# Patient Record
Sex: FEMALE | Race: WHITE | Employment: OTHER | URBAN - METROPOLITAN AREA
[De-identification: names, ages, dates, MRNs, and addresses within clinical notes are randomized per-mention and may not be internally consistent; named-entity substitution may affect disease eponyms.]

---

## 2020-12-30 ENCOUNTER — HOSPITAL ENCOUNTER (OUTPATIENT)
Dept: GENERAL RADIOLOGY | Age: 65
Setting detail: OUTPATIENT SURGERY
Discharge: HOME | End: 2021-01-01
Attending: ORTHOPAEDIC SURGERY
Payer: MEDICARE

## 2020-12-30 ENCOUNTER — ANESTHESIA (OUTPATIENT)
Dept: OPERATING ROOM | Age: 65
End: 2020-12-30
Payer: MEDICARE

## 2020-12-30 ENCOUNTER — ANESTHESIA EVENT (OUTPATIENT)
Dept: OPERATING ROOM | Age: 65
End: 2020-12-30
Payer: MEDICARE

## 2020-12-30 ENCOUNTER — HOSPITAL ENCOUNTER (OUTPATIENT)
Age: 65
Setting detail: OUTPATIENT SURGERY
Discharge: HOME | End: 2020-12-30
Attending: ORTHOPAEDIC SURGERY | Admitting: ORTHOPAEDIC SURGERY
Payer: MEDICARE

## 2020-12-30 VITALS
TEMPERATURE: 97.9 F | RESPIRATION RATE: 20 BRPM | OXYGEN SATURATION: 99 % | DIASTOLIC BLOOD PRESSURE: 75 MMHG | HEART RATE: 89 BPM | SYSTOLIC BLOOD PRESSURE: 135 MMHG | BODY MASS INDEX: 21.34 KG/M2 | HEIGHT: 64 IN | WEIGHT: 125 LBS

## 2020-12-30 VITALS — SYSTOLIC BLOOD PRESSURE: 99 MMHG | DIASTOLIC BLOOD PRESSURE: 57 MMHG | OXYGEN SATURATION: 100 %

## 2020-12-30 DIAGNOSIS — R52 PAIN: ICD-10-CM

## 2020-12-30 LAB
ANION GAP SERPL CALCULATED.3IONS-SCNC: 10 MEQ/L (ref 9–15)
BASOPHILS ABSOLUTE: 0.1 K/UL (ref 0–0.2)
BASOPHILS RELATIVE PERCENT: 1.2 %
BUN BLDV-MCNC: 9 MG/DL (ref 8–23)
CALCIUM SERPL-MCNC: 8.7 MG/DL (ref 8.5–9.9)
CHLORIDE BLD-SCNC: 98 MEQ/L (ref 95–107)
CO2: 26 MEQ/L (ref 20–31)
CREAT SERPL-MCNC: 0.65 MG/DL (ref 0.5–0.9)
EKG ATRIAL RATE: 89 BPM
EKG P AXIS: 65 DEGREES
EKG P-R INTERVAL: 126 MS
EKG Q-T INTERVAL: 356 MS
EKG QRS DURATION: 76 MS
EKG QTC CALCULATION (BAZETT): 433 MS
EKG R AXIS: 36 DEGREES
EKG T AXIS: 41 DEGREES
EKG VENTRICULAR RATE: 89 BPM
EOSINOPHILS ABSOLUTE: 0.5 K/UL (ref 0–0.7)
EOSINOPHILS RELATIVE PERCENT: 4.2 %
GFR AFRICAN AMERICAN: >60
GFR NON-AFRICAN AMERICAN: >60
GLUCOSE BLD-MCNC: 87 MG/DL (ref 70–99)
HCT VFR BLD CALC: 46.1 % (ref 37–47)
HEMOGLOBIN: 15.3 G/DL (ref 12–16)
LYMPHOCYTES ABSOLUTE: 2.3 K/UL (ref 1–4.8)
LYMPHOCYTES RELATIVE PERCENT: 20.9 %
MCH RBC QN AUTO: 27.7 PG (ref 27–31.3)
MCHC RBC AUTO-ENTMCNC: 33.1 % (ref 33–37)
MCV RBC AUTO: 83.7 FL (ref 82–100)
MONOCYTES ABSOLUTE: 1 K/UL (ref 0.2–0.8)
MONOCYTES RELATIVE PERCENT: 8.8 %
NEUTROPHILS ABSOLUTE: 7.1 K/UL (ref 1.4–6.5)
NEUTROPHILS RELATIVE PERCENT: 64.9 %
PDW BLD-RTO: 14.3 % (ref 11.5–14.5)
PLATELET # BLD: 247 K/UL (ref 130–400)
POTASSIUM SERPL-SCNC: 5.1 MEQ/L (ref 3.4–4.9)
RBC # BLD: 5.51 M/UL (ref 4.2–5.4)
SARS-COV-2, NAAT: NOT DETECTED
SODIUM BLD-SCNC: 134 MEQ/L (ref 135–144)
WBC # BLD: 11 K/UL (ref 4.8–10.8)

## 2020-12-30 PROCEDURE — 2580000003 HC RX 258: Performed by: ORTHOPAEDIC SURGERY

## 2020-12-30 PROCEDURE — 3700000000 HC ANESTHESIA ATTENDED CARE: Performed by: ORTHOPAEDIC SURGERY

## 2020-12-30 PROCEDURE — C1713 ANCHOR/SCREW BN/BN,TIS/BN: HCPCS | Performed by: ORTHOPAEDIC SURGERY

## 2020-12-30 PROCEDURE — 7100000010 HC PHASE II RECOVERY - FIRST 15 MIN: Performed by: ORTHOPAEDIC SURGERY

## 2020-12-30 PROCEDURE — 6360000002 HC RX W HCPCS: Performed by: ORTHOPAEDIC SURGERY

## 2020-12-30 PROCEDURE — 2500000003 HC RX 250 WO HCPCS: Performed by: ORTHOPAEDIC SURGERY

## 2020-12-30 PROCEDURE — U0002 COVID-19 LAB TEST NON-CDC: HCPCS

## 2020-12-30 PROCEDURE — 2709999900 HC NON-CHARGEABLE SUPPLY: Performed by: ORTHOPAEDIC SURGERY

## 2020-12-30 PROCEDURE — 3700000001 HC ADD 15 MINUTES (ANESTHESIA): Performed by: ORTHOPAEDIC SURGERY

## 2020-12-30 PROCEDURE — 3209999900 FLUORO FOR SURGICAL PROCEDURES

## 2020-12-30 PROCEDURE — 80048 BASIC METABOLIC PNL TOTAL CA: CPT

## 2020-12-30 PROCEDURE — 85025 COMPLETE CBC W/AUTO DIFF WBC: CPT

## 2020-12-30 PROCEDURE — 7100000000 HC PACU RECOVERY - FIRST 15 MIN: Performed by: ORTHOPAEDIC SURGERY

## 2020-12-30 PROCEDURE — 3600000014 HC SURGERY LEVEL 4 ADDTL 15MIN: Performed by: ORTHOPAEDIC SURGERY

## 2020-12-30 PROCEDURE — 3600000004 HC SURGERY LEVEL 4 BASE: Performed by: ORTHOPAEDIC SURGERY

## 2020-12-30 PROCEDURE — 7100000011 HC PHASE II RECOVERY - ADDTL 15 MIN: Performed by: ORTHOPAEDIC SURGERY

## 2020-12-30 PROCEDURE — 7100000001 HC PACU RECOVERY - ADDTL 15 MIN: Performed by: ORTHOPAEDIC SURGERY

## 2020-12-30 PROCEDURE — 93005 ELECTROCARDIOGRAM TRACING: CPT | Performed by: ORTHOPAEDIC SURGERY

## 2020-12-30 PROCEDURE — 6360000002 HC RX W HCPCS: Performed by: NURSE ANESTHETIST, CERTIFIED REGISTERED

## 2020-12-30 DEVICE — IMPLANTABLE DEVICE: Type: IMPLANTABLE DEVICE | Status: FUNCTIONAL

## 2020-12-30 DEVICE — SCREW BNE L11MM DIA1.3MM CORT HND 316L S STL ST T4 STARDRV: Type: IMPLANTABLE DEVICE | Status: FUNCTIONAL

## 2020-12-30 RX ORDER — ATORVASTATIN CALCIUM 80 MG/1
80 TABLET, FILM COATED ORAL DAILY
COMMUNITY

## 2020-12-30 RX ORDER — SODIUM CHLORIDE, SODIUM LACTATE, POTASSIUM CHLORIDE, CALCIUM CHLORIDE 600; 310; 30; 20 MG/100ML; MG/100ML; MG/100ML; MG/100ML
INJECTION, SOLUTION INTRAVENOUS CONTINUOUS
Status: DISCONTINUED | OUTPATIENT
Start: 2020-12-30 | End: 2020-12-30 | Stop reason: HOSPADM

## 2020-12-30 RX ORDER — HYDROCODONE BITARTRATE AND ACETAMINOPHEN 5; 325 MG/1; MG/1
2 TABLET ORAL PRN
Status: DISCONTINUED | OUTPATIENT
Start: 2020-12-30 | End: 2020-12-30 | Stop reason: HOSPADM

## 2020-12-30 RX ORDER — PROPOFOL 10 MG/ML
INJECTION, EMULSION INTRAVENOUS PRN
Status: DISCONTINUED | OUTPATIENT
Start: 2020-12-30 | End: 2020-12-30 | Stop reason: SDUPTHER

## 2020-12-30 RX ORDER — LISINOPRIL 5 MG/1
5 TABLET ORAL DAILY
COMMUNITY

## 2020-12-30 RX ORDER — HYDROCODONE BITARTRATE AND ACETAMINOPHEN 5; 325 MG/1; MG/1
1 TABLET ORAL PRN
Status: DISCONTINUED | OUTPATIENT
Start: 2020-12-30 | End: 2020-12-30 | Stop reason: HOSPADM

## 2020-12-30 RX ORDER — CEFAZOLIN SODIUM 2 G/50ML
2 SOLUTION INTRAVENOUS
Status: COMPLETED | OUTPATIENT
Start: 2020-12-30 | End: 2020-12-30

## 2020-12-30 RX ORDER — DIPHENHYDRAMINE HYDROCHLORIDE 50 MG/ML
12.5 INJECTION INTRAMUSCULAR; INTRAVENOUS
Status: DISCONTINUED | OUTPATIENT
Start: 2020-12-30 | End: 2020-12-30 | Stop reason: HOSPADM

## 2020-12-30 RX ORDER — MEPERIDINE HYDROCHLORIDE 25 MG/ML
12.5 INJECTION INTRAMUSCULAR; INTRAVENOUS; SUBCUTANEOUS EVERY 5 MIN PRN
Status: DISCONTINUED | OUTPATIENT
Start: 2020-12-30 | End: 2020-12-30 | Stop reason: HOSPADM

## 2020-12-30 RX ORDER — DEXAMETHASONE SODIUM PHOSPHATE 4 MG/ML
INJECTION, SOLUTION INTRA-ARTICULAR; INTRALESIONAL; INTRAMUSCULAR; INTRAVENOUS; SOFT TISSUE PRN
Status: DISCONTINUED | OUTPATIENT
Start: 2020-12-30 | End: 2020-12-30 | Stop reason: SDUPTHER

## 2020-12-30 RX ORDER — LIDOCAINE HYDROCHLORIDE 10 MG/ML
10 INJECTION, SOLUTION INFILTRATION; PERINEURAL
Status: DISCONTINUED | OUTPATIENT
Start: 2020-12-30 | End: 2020-12-30 | Stop reason: HOSPADM

## 2020-12-30 RX ORDER — HYDROCHLOROTHIAZIDE 25 MG/1
25 TABLET ORAL DAILY
COMMUNITY

## 2020-12-30 RX ORDER — ACETAMINOPHEN 500 MG
1000 TABLET ORAL EVERY 6 HOURS PRN
COMMUNITY

## 2020-12-30 RX ORDER — DIPHENHYDRAMINE HYDROCHLORIDE 50 MG/ML
INJECTION INTRAMUSCULAR; INTRAVENOUS PRN
Status: DISCONTINUED | OUTPATIENT
Start: 2020-12-30 | End: 2020-12-30 | Stop reason: SDUPTHER

## 2020-12-30 RX ORDER — ONDANSETRON 2 MG/ML
INJECTION INTRAMUSCULAR; INTRAVENOUS PRN
Status: DISCONTINUED | OUTPATIENT
Start: 2020-12-30 | End: 2020-12-30 | Stop reason: SDUPTHER

## 2020-12-30 RX ORDER — MAGNESIUM HYDROXIDE 1200 MG/15ML
LIQUID ORAL CONTINUOUS PRN
Status: COMPLETED | OUTPATIENT
Start: 2020-12-30 | End: 2020-12-30

## 2020-12-30 RX ORDER — ONDANSETRON 2 MG/ML
4 INJECTION INTRAMUSCULAR; INTRAVENOUS
Status: DISCONTINUED | OUTPATIENT
Start: 2020-12-30 | End: 2020-12-30 | Stop reason: HOSPADM

## 2020-12-30 RX ORDER — MIDAZOLAM HYDROCHLORIDE 1 MG/ML
INJECTION INTRAMUSCULAR; INTRAVENOUS PRN
Status: DISCONTINUED | OUTPATIENT
Start: 2020-12-30 | End: 2020-12-30 | Stop reason: SDUPTHER

## 2020-12-30 RX ORDER — BUPIVACAINE HYDROCHLORIDE 5 MG/ML
INJECTION, SOLUTION EPIDURAL; INTRACAUDAL PRN
Status: DISCONTINUED | OUTPATIENT
Start: 2020-12-30 | End: 2020-12-30 | Stop reason: ALTCHOICE

## 2020-12-30 RX ORDER — LIDOCAINE HYDROCHLORIDE 20 MG/ML
INJECTION, SOLUTION INTRAVENOUS PRN
Status: DISCONTINUED | OUTPATIENT
Start: 2020-12-30 | End: 2020-12-30 | Stop reason: SDUPTHER

## 2020-12-30 RX ORDER — BETAMETHASONE DIPROPIONATE 0.05 %
OINTMENT (GRAM) TOPICAL 2 TIMES DAILY
COMMUNITY

## 2020-12-30 RX ORDER — LIDOCAINE HYDROCHLORIDE 10 MG/ML
1 INJECTION, SOLUTION EPIDURAL; INFILTRATION; INTRACAUDAL; PERINEURAL
Status: DISCONTINUED | OUTPATIENT
Start: 2020-12-30 | End: 2020-12-30 | Stop reason: HOSPADM

## 2020-12-30 RX ORDER — PREDNISONE 1 MG/1
5 TABLET ORAL DAILY
COMMUNITY

## 2020-12-30 RX ORDER — METOCLOPRAMIDE HYDROCHLORIDE 5 MG/ML
10 INJECTION INTRAMUSCULAR; INTRAVENOUS
Status: DISCONTINUED | OUTPATIENT
Start: 2020-12-30 | End: 2020-12-30 | Stop reason: HOSPADM

## 2020-12-30 RX ORDER — ESOMEPRAZOLE MAGNESIUM 40 MG/1
40 FOR SUSPENSION ORAL DAILY
COMMUNITY

## 2020-12-30 RX ORDER — SODIUM CHLORIDE 0.9 % (FLUSH) 0.9 %
10 SYRINGE (ML) INJECTION PRN
Status: DISCONTINUED | OUTPATIENT
Start: 2020-12-30 | End: 2020-12-30 | Stop reason: HOSPADM

## 2020-12-30 RX ORDER — FENTANYL CITRATE 50 UG/ML
50 INJECTION, SOLUTION INTRAMUSCULAR; INTRAVENOUS EVERY 10 MIN PRN
Status: DISCONTINUED | OUTPATIENT
Start: 2020-12-30 | End: 2020-12-30 | Stop reason: HOSPADM

## 2020-12-30 RX ORDER — INFLIXIMAB 100 MG/10ML
5 INJECTION, POWDER, LYOPHILIZED, FOR SOLUTION INTRAVENOUS SEE ADMIN INSTRUCTIONS
COMMUNITY

## 2020-12-30 RX ORDER — BUPIVACAINE HYDROCHLORIDE 5 MG/ML
30 INJECTION, SOLUTION EPIDURAL; INTRACAUDAL
Status: DISCONTINUED | OUTPATIENT
Start: 2020-12-30 | End: 2020-12-30 | Stop reason: HOSPADM

## 2020-12-30 RX ORDER — SODIUM CHLORIDE 0.9 % (FLUSH) 0.9 %
10 SYRINGE (ML) INJECTION EVERY 12 HOURS SCHEDULED
Status: DISCONTINUED | OUTPATIENT
Start: 2020-12-30 | End: 2020-12-30 | Stop reason: HOSPADM

## 2020-12-30 RX ORDER — FENTANYL CITRATE 50 UG/ML
INJECTION, SOLUTION INTRAMUSCULAR; INTRAVENOUS PRN
Status: DISCONTINUED | OUTPATIENT
Start: 2020-12-30 | End: 2020-12-30 | Stop reason: SDUPTHER

## 2020-12-30 RX ADMIN — SODIUM CHLORIDE, POTASSIUM CHLORIDE, SODIUM LACTATE AND CALCIUM CHLORIDE: 600; 310; 30; 20 INJECTION, SOLUTION INTRAVENOUS at 11:05

## 2020-12-30 RX ADMIN — CEFAZOLIN SODIUM 2 G: 2 SOLUTION INTRAVENOUS at 12:50

## 2020-12-30 RX ADMIN — FENTANYL CITRATE 25 MCG: 50 INJECTION, SOLUTION INTRAMUSCULAR; INTRAVENOUS at 13:11

## 2020-12-30 RX ADMIN — FENTANYL CITRATE 25 MCG: 50 INJECTION, SOLUTION INTRAMUSCULAR; INTRAVENOUS at 12:53

## 2020-12-30 RX ADMIN — DIPHENHYDRAMINE HYDROCHLORIDE 12.5 MG: 50 INJECTION INTRAMUSCULAR; INTRAVENOUS at 12:43

## 2020-12-30 RX ADMIN — LIDOCAINE HYDROCHLORIDE 50 MG: 20 INJECTION, SOLUTION INTRAVENOUS at 12:40

## 2020-12-30 RX ADMIN — DEXAMETHASONE SODIUM PHOSPHATE 4 MG: 4 INJECTION, SOLUTION INTRAMUSCULAR; INTRAVENOUS at 12:44

## 2020-12-30 RX ADMIN — PROPOFOL 40 MG: 10 INJECTION, EMULSION INTRAVENOUS at 12:48

## 2020-12-30 RX ADMIN — MIDAZOLAM HYDROCHLORIDE 2 MG: 2 INJECTION, SOLUTION INTRAMUSCULAR; INTRAVENOUS at 12:34

## 2020-12-30 RX ADMIN — PROPOFOL 100 MG: 10 INJECTION, EMULSION INTRAVENOUS at 12:40

## 2020-12-30 RX ADMIN — ONDANSETRON 4 MG: 2 INJECTION INTRAMUSCULAR; INTRAVENOUS at 12:51

## 2020-12-30 ASSESSMENT — PULMONARY FUNCTION TESTS
PIF_VALUE: 3
PIF_VALUE: 8
PIF_VALUE: 3
PIF_VALUE: 6
PIF_VALUE: 3
PIF_VALUE: 6
PIF_VALUE: 3
PIF_VALUE: 4
PIF_VALUE: 3
PIF_VALUE: 8
PIF_VALUE: 0
PIF_VALUE: 0
PIF_VALUE: 3
PIF_VALUE: 3
PIF_VALUE: 8
PIF_VALUE: 0
PIF_VALUE: 3
PIF_VALUE: 8
PIF_VALUE: 3
PIF_VALUE: 8
PIF_VALUE: 6
PIF_VALUE: 3
PIF_VALUE: 8
PIF_VALUE: 8
PIF_VALUE: 6
PIF_VALUE: 8
PIF_VALUE: 3
PIF_VALUE: 3
PIF_VALUE: 1
PIF_VALUE: 3
PIF_VALUE: 8
PIF_VALUE: 6
PIF_VALUE: 0
PIF_VALUE: 9
PIF_VALUE: 8
PIF_VALUE: 8
PIF_VALUE: 3
PIF_VALUE: 9
PIF_VALUE: 9
PIF_VALUE: 3
PIF_VALUE: 8
PIF_VALUE: 3
PIF_VALUE: 3
PIF_VALUE: 8
PIF_VALUE: 3
PIF_VALUE: 3
PIF_VALUE: 2
PIF_VALUE: 3
PIF_VALUE: 8
PIF_VALUE: 3
PIF_VALUE: 2
PIF_VALUE: 3
PIF_VALUE: 8
PIF_VALUE: 6
PIF_VALUE: 9
PIF_VALUE: 8
PIF_VALUE: 3
PIF_VALUE: 8
PIF_VALUE: 0
PIF_VALUE: 3
PIF_VALUE: 3
PIF_VALUE: 6
PIF_VALUE: 1
PIF_VALUE: 0
PIF_VALUE: 3
PIF_VALUE: 3
PIF_VALUE: 8
PIF_VALUE: 18
PIF_VALUE: 3
PIF_VALUE: 3
PIF_VALUE: 9
PIF_VALUE: 8
PIF_VALUE: 0
PIF_VALUE: 8
PIF_VALUE: 3
PIF_VALUE: 9
PIF_VALUE: 8
PIF_VALUE: 1
PIF_VALUE: 8
PIF_VALUE: 3
PIF_VALUE: 3
PIF_VALUE: 8
PIF_VALUE: 9
PIF_VALUE: 9

## 2020-12-30 NOTE — ANESTHESIA POSTPROCEDURE EVALUATION
Department of Anesthesiology  Postprocedure Note    Patient: Lee Madrigal  MRN: [id number]  YOB: 1955  Date of evaluation: 12/30/2020  Time:  2:20 PM     Procedure Summary     Date: 12/30/20 Room / Location: [address]    Anesthesia Start: 7536 Anesthesia Stop:     Procedure: RIGHT CLOSED REDUCTION INTERNAL FIXATION VERSUS OPEN REDUCTION INTERNAL FIXATION RIGHT SMALL FINGER PROXIMAL PHALANX FRACTURE (Right Hand) Diagnosis: (RIGHT SMALL FINGER PROXIMAL PHALANX FRACTURE)    Surgeons: Alberto Montelongo DO Responsible Provider: Elis Schaeffer DO    Anesthesia Type: general ASA Status: 2          Anesthesia Type: general    Samantha Phase I:      Samantha Phase II:      Last vitals: Reviewed and per EMR flowsheets.        Anesthesia Post Evaluation    Patient location during evaluation: PACU  Patient participation: waiting for patient participation  Level of consciousness: awake and alert  Pain score: 0  Airway patency: patent  Nausea & Vomiting: no vomiting and no nausea  Complications: no  Cardiovascular status: hemodynamically stable  Respiratory status: acceptable, face mask, nonlabored ventilation, spontaneous ventilation and oral airway  Hydration status: stable

## 2020-12-30 NOTE — OP NOTE
Operative Note      Patient: Shiv Easley  YOB: 1955  MRN: [id number]    Date of Procedure: 12/30/2020        Preoperative diagnosis: Displaced unstable right small finger proximal phalanx fracture    Postoperative diagnosis: Same     Procedure planned: Open reduction with internal fixation right small finger proximal phalanx fracture versus closed reduction percutaneous pin fixation. Procedure performed: Open reduction with internal fixation right small finger proximal phalanx fracture    Surgeon: Denia Mayer D.O. Assistant: WES Kasper  The physician assistant was present through the entire case. Given the nature of the disease process and the procedure to be performed a skilled surgical assistant was necessary during the case. The assistant was necessary in order to hold retractors and directly assist in the operation. A certified scrub tech was at the back table managing instruments and supplies for the surgical case. Anesthesia: General    Estimated blood loss: Less than 20 cc    Drains: None    Tourniquet: Tourniquet for approximately 30 minutes to the well-padded right upper arm    Specimens: None    Implants: Synthes 1.3 as below    Indications: The patient sustained injury to the right small finger. X-ray showed displaced proximal phalanx fracture. Treatment options were discussed including operative and nonoperative strategies. She elected to proceed forth with surgery. Informed consent was signed and placed in the chart. Complications: None noted at the time of surgery     Description of operation: The patient was taken to the operative suite and placed in the supine position on the operating table. A timeout was performed and the right small finger was confirmed to be the operative site. She was carefully positioned on the table in such a fashion as to pad all bony prominences and peripheral nerves.  She was administered appropriate IV antibiotics and general anesthesia. She was prepped and draped in the normal sterile fashion. Fluoroscopic imaging was brought in. Close reduction forces were used to attempt a closed reduction and pinning. This proved insufficient. An apex ulnar curvilinear incision was made extending from MCP to PIP with a 15 blade. Dissection was carried down to the extensor mechanism and a full-thickness subcutaneous flap reflected radially. A small window was made about the ulnar border of the extensor mechanism and direct techniques were used to reduce the fracture. There was ulnar-sided comminution through the fracture plane that made passage of simple lag screws a poor option. It was deemed most appropriate to apply a dorsal plate and screw construct. The extensor tendon was split longitudinally and the fracture plane exposed. We could see the entirety of the fracture plane and it was anatomically reduced both by direct inspection and fluoroscopic imaging. The appropriate plate was then chosen from the Synthes set and coupled to bone with 2 cortical screws one in the near segment and one in the distal segment. Feeling satisfied 2 additional locking screws were placed in the distal segment and 2 additional locking screws placed in the near segment. The cortical screw placed in the distal segment was swapped out for a locking screw. An additional locking screw was placed in the near segment and the original cortical screw placed in the near segment removed as it had poor purchase. Final images were sent to the PACS system. Reduction was noted to be anatomic and fixation stable. Irrigation was performed. 4-0 Tycron was used to close the longitudinal split in the extensor mechanism. The tourniquet was relieved and hemostasis achieved. Interrupted nylon stitches were used to close the skin and a bulky soft dressing placed along with ulnar gutter plaster splint. She was allowed to arise from anesthesia and taken to recovery in stable condition.  Overall she tolerated procedure well. Disposition: Stable to PACU      Implants:  Implant Name Type Inv. Item Serial No.  Lot No. LRB No. Used Action   PLATE BNE [id number] 3X5 H NONSTERILE HND S STL Y SHP  PLATE BNE [id number] 3X5 H NONSTERILE HND S STL Y SHP  DEPUY [employer]  Right 1 Implanted   SCREW BNE L6MM DIA1. 3MM BEBETO HND S STL ST T4 STARDRV RECESS  SCREW BNE L6MM DIA1. 3MM BEBETO HND S STL ST T4 STARDRV RECESS  DEPUY [employer]  Right 2 Implanted   SCREW BNE L11MM DIA1. 3MM BEBETO HND 316L S STL ST T4 STARDRV  SCREW BNE L11MM DIA1. 3MM BEBETO HND 316L S STL ST T4 STARDRV  DEPUY SYNTHES [facility]-WD  Right 1 Implanted   SCREW BNE L6MM DIA1. 3MM HND 316L S STL ST [place] T4 STARDRV  SCREW BNE L6MM DIA1. 3MM HND 316L S STL ST [place] T4 STARDRV  DEPUY SYNTHES [facility]-WD  Right 1 Implanted   SCREW BNE L7MM DIA1. 3MM GRN S STL ST [place] FULL THRD T4  SCREW BNE L7MM DIA1. 3MM GRN S STL ST [place] FULL THRD T4  DEPUY [employer]  Right 1 Implanted   SCREW BNE L9MM DIA1. 3MM HND 316L S STL ST [place] T4 STARDRV  SCREW BNE L9MM DIA1. 3MM HND 316L S STL ST [place] T4 STARDRV  DEPUY [employer]  Right 1 Implanted   SCREW BNE L10MM DIA1. 3MM GRN S STL ST [place] FULL THRD T4  SCREW BNE L10MM DIA1. 3MM GRN S STL ST [place] FULL THRD T4  DEPUY [employer]  Right 2 Implanted   SCREW BNE L11MM DIA1. 3MM HND 316L S STL ST [place] T4 STARDRV  SCREW BNE L11MM DIA1. 3MM HND 316L S STL ST [place] T4 STARDRV  DEPUY SYNTHES [facility]-WD  Right 1 Implanted             Electronically signed by Tania Obrien DO on 12/30/2020 at 2:05 PM

## 2020-12-30 NOTE — ANESTHESIA PRE PROCEDURE
Department of Anesthesiology  Preprocedure Note       Name:  Perla Katz   Age:  77 y.o.  :  1955                                          MRN:  [id number]         Date:  2020      Surgeon: Kirsten Ralph):  Senthil Bravo DO    Procedure: Procedure(s):  RIGHT CLOSED REDUCTION INTERNAL FIXATION VERSUS OPEN REDUCTION INTERNAL FIXATION RIGHT SMALL FINGER PROXIMAL PHALANX FRACTURE SUPINE' C-ARM; SYNTHES VA HAND SET (LABS & COVID ON ADMIT) SYNTHES-LONI    Medications prior to admission:   Prior to Admission medications    Medication Sig Start Date End Date Taking? Authorizing Provider   betamethasone dipropionate (DIPROLENE) 0.05 % ointment Apply topically 2 times daily Apply topically 2 times daily.    Yes Historical Provider, MD   esomeprazole Magnesium (NEXIUM) 40 MG PACK Take 40 mg by mouth daily   Yes Historical Provider, MD   hydroCHLOROthiazide (HYDRODIURIL) 25 MG tablet Take 25 mg by mouth daily   Yes Historical Provider, MD   lisinopril (PRINIVIL;ZESTRIL) 5 MG tablet Take 5 mg by mouth daily   Yes Historical Provider, MD   predniSONE (DELTASONE) 5 MG tablet Take 5 mg by mouth daily   Yes Historical Provider, MD   acetaminophen (TYLENOL) 500 MG tablet Take 1,000 mg by mouth every 6 hours as needed for Pain   Yes Historical Provider, MD   atorvastatin (LIPITOR) 80 MG tablet Take 80 mg by mouth daily    Historical Provider, MD   denosumab (PROLIA) 60 MG/ML SOSY SC injection Inject 60 mg into the skin once    Historical Provider, MD   inFLIXimab (REMICADE) 100 MG injection Infuse 5 mg/kg intravenously See Admin Instructions    Historical Provider, MD       Current medications:    Current Facility-Administered Medications   Medication Dose Route Frequency Provider Last Rate Last Admin    ceFAZolin (ANCEF) 2 g in dextrose 3 % 50 mL IVPB (duplex)  2 g Intravenous On Call to Alexandre Tinoco DO        lactated ringers infusion   Intravenous Continuous Darío Little DO

## 2020-12-30 NOTE — PROGRESS NOTES
To room 18 accompanied by Kirsten Charles. Head of bed elevated, taking ice chips. Does not want anything to eat or drink while here. Good circulation, arm in sling. Ice pack with pt.    1520  Going home instructions with one prescription given to pt with good understanding by Corin Montgomery RN.

## 2020-12-31 PROCEDURE — 93010 ELECTROCARDIOGRAM REPORT: CPT | Performed by: INTERNAL MEDICINE

## 2023-05-30 LAB
ANION GAP IN SER/PLAS: 12 MMOL/L (ref 10–20)
CALCIUM (MG/DL) IN SER/PLAS: 9.8 MG/DL (ref 8.6–10.6)
CARBON DIOXIDE, TOTAL (MMOL/L) IN SER/PLAS: 30 MMOL/L (ref 21–32)
CHLORIDE (MMOL/L) IN SER/PLAS: 96 MMOL/L (ref 98–107)
CREATININE (MG/DL) IN SER/PLAS: 0.82 MG/DL (ref 0.5–1.05)
GFR FEMALE: 77 ML/MIN/1.73M2
GLUCOSE (MG/DL) IN SER/PLAS: 91 MG/DL (ref 74–99)
POTASSIUM (MMOL/L) IN SER/PLAS: 4.2 MMOL/L (ref 3.5–5.3)
SODIUM (MMOL/L) IN SER/PLAS: 134 MMOL/L (ref 136–145)
UREA NITROGEN (MG/DL) IN SER/PLAS: 13 MG/DL (ref 6–23)

## 2023-09-26 PROBLEM — I25.10 CAD (CORONARY ARTERY DISEASE): Status: ACTIVE | Noted: 2023-09-26

## 2023-09-26 PROBLEM — K21.9 ESOPHAGEAL REFLUX: Status: ACTIVE | Noted: 2023-09-26

## 2023-09-26 PROBLEM — R13.14 PHARYNGOESOPHAGEAL DYSPHAGIA: Status: ACTIVE | Noted: 2023-09-26

## 2023-09-26 PROBLEM — E78.5 HYPERLIPIDEMIA: Status: ACTIVE | Noted: 2023-09-26

## 2023-09-26 PROBLEM — M19.049 CMC ARTHRITIS: Status: ACTIVE | Noted: 2023-09-26

## 2023-09-26 PROBLEM — M81.0 OSTEOPOROSIS: Status: ACTIVE | Noted: 2023-09-26

## 2023-09-26 PROBLEM — K63.5 COLON POLYPS: Status: ACTIVE | Noted: 2023-09-26

## 2023-09-26 PROBLEM — H92.01 OTALGIA OF RIGHT EAR: Status: ACTIVE | Noted: 2023-09-26

## 2023-09-26 PROBLEM — I10 ESSENTIAL HYPERTENSION: Status: ACTIVE | Noted: 2023-09-26

## 2023-09-26 PROBLEM — K21.00 GASTROESOPHAGEAL REFLUX DISEASE WITH ESOPHAGITIS: Status: ACTIVE | Noted: 2023-09-26

## 2023-09-26 PROBLEM — M11.20 PSEUDOGOUT: Status: ACTIVE | Noted: 2023-09-26

## 2023-09-26 PROBLEM — F17.210 CIGARETTE NICOTINE DEPENDENCE: Status: ACTIVE | Noted: 2023-09-26

## 2023-09-26 PROBLEM — L40.59 POLYARTICULAR PSORIATIC ARTHRITIS (MULTI): Status: ACTIVE | Noted: 2023-09-26

## 2023-09-26 PROBLEM — E55.9 VITAMIN D DEFICIENCY: Status: ACTIVE | Noted: 2023-09-26

## 2023-09-26 PROBLEM — M06.9 RHEUMATOID ARTHRITIS (MULTI): Status: ACTIVE | Noted: 2023-09-26

## 2023-09-26 RX ORDER — PREDNISONE 5 MG/1
1 TABLET ORAL DAILY
COMMUNITY
Start: 2013-02-22 | End: 2023-10-02 | Stop reason: HOSPADM

## 2023-09-26 RX ORDER — INFLIXIMAB 100 MG/10ML
5 INJECTION, POWDER, LYOPHILIZED, FOR SOLUTION INTRAVENOUS
Status: ON HOLD | COMMUNITY
Start: 2013-02-22 | End: 2023-09-30 | Stop reason: ENTERED-IN-ERROR

## 2023-09-26 RX ORDER — HYDROCHLOROTHIAZIDE 25 MG/1
1 TABLET ORAL DAILY
COMMUNITY
Start: 2012-04-26 | End: 2024-01-05 | Stop reason: SDUPTHER

## 2023-09-26 RX ORDER — ATORVASTATIN CALCIUM 80 MG/1
1 TABLET, FILM COATED ORAL DAILY
Status: ON HOLD | COMMUNITY
Start: 2020-03-30 | End: 2023-09-30 | Stop reason: ENTERED-IN-ERROR

## 2023-09-26 RX ORDER — DEXTROMETHORPHAN HYDROBROMIDE, GUAIFENESIN 5; 100 MG/5ML; MG/5ML
2 LIQUID ORAL EVERY 8 HOURS PRN
COMMUNITY

## 2023-09-26 RX ORDER — DENOSUMAB 60 MG/ML
60 INJECTION SUBCUTANEOUS ONCE
Status: ON HOLD | COMMUNITY
Start: 2020-08-18 | End: 2023-09-30 | Stop reason: ENTERED-IN-ERROR

## 2023-09-26 RX ORDER — ESOMEPRAZOLE MAGNESIUM 40 MG/1
40 CAPSULE, DELAYED RELEASE ORAL 2 TIMES DAILY
Status: ON HOLD | COMMUNITY
End: 2023-09-30 | Stop reason: ENTERED-IN-ERROR

## 2023-09-26 RX ORDER — LISINOPRIL 5 MG/1
1 TABLET ORAL DAILY
COMMUNITY
Start: 2012-07-26 | End: 2024-01-05 | Stop reason: SDUPTHER

## 2023-09-26 RX ORDER — GABAPENTIN 100 MG/1
100 CAPSULE ORAL 3 TIMES DAILY PRN
COMMUNITY

## 2023-09-26 RX ORDER — BETAMETHASONE DIPROPIONATE 0.5 MG/G
OINTMENT TOPICAL
Status: ON HOLD | COMMUNITY
Start: 2012-09-24 | End: 2023-09-30 | Stop reason: ENTERED-IN-ERROR

## 2023-09-28 ENCOUNTER — HOSPITAL ENCOUNTER (INPATIENT)
Dept: DATA CONVERSION | Facility: HOSPITAL | Age: 68
LOS: 4 days | Discharge: NURSING, REHAB OR LONG-TERM CARE | DRG: 481 | End: 2023-10-02
Attending: STUDENT IN AN ORGANIZED HEALTH CARE EDUCATION/TRAINING PROGRAM | Admitting: STUDENT IN AN ORGANIZED HEALTH CARE EDUCATION/TRAINING PROGRAM
Payer: MEDICARE

## 2023-09-28 DIAGNOSIS — S72.92XA: ICD-10-CM

## 2023-09-28 DIAGNOSIS — M05.761 RHEUMATOID ARTHRITIS INVOLVING BOTH KNEES WITH POSITIVE RHEUMATOID FACTOR (MULTI): Primary | ICD-10-CM

## 2023-09-28 DIAGNOSIS — M05.762 RHEUMATOID ARTHRITIS INVOLVING BOTH KNEES WITH POSITIVE RHEUMATOID FACTOR (MULTI): Primary | ICD-10-CM

## 2023-09-28 LAB
ALANINE AMINOTRANSFERASE (SGPT) (U/L) IN SER/PLAS: 15 U/L (ref 7–45)
ALBUMIN (G/DL) IN SER/PLAS: 3.6 G/DL (ref 3.4–5)
ALKALINE PHOSPHATASE (U/L) IN SER/PLAS: 55 U/L (ref 33–136)
ANION GAP IN SER/PLAS: 20 MMOL/L (ref 10–20)
ASPARTATE AMINOTRANSFERASE (SGOT) (U/L) IN SER/PLAS: 20 U/L (ref 9–39)
BASOPHILS (10*3/UL) IN BLOOD BY AUTOMATED COUNT: 0.02 X10E9/L (ref 0–0.1)
BASOPHILS/100 LEUKOCYTES IN BLOOD BY AUTOMATED COUNT: 0.1 % (ref 0–2)
BILIRUBIN TOTAL (MG/DL) IN SER/PLAS: 0.5 MG/DL (ref 0–1.2)
CALCIUM (MG/DL) IN SER/PLAS: 8.9 MG/DL (ref 8.6–10.3)
CARBON DIOXIDE, TOTAL (MMOL/L) IN SER/PLAS: 20 MMOL/L (ref 21–32)
CHLORIDE (MMOL/L) IN SER/PLAS: 105 MMOL/L (ref 98–107)
CREATININE (MG/DL) IN SER/PLAS: 1.69 MG/DL (ref 0.5–1.05)
EOSINOPHILS (10*3/UL) IN BLOOD BY AUTOMATED COUNT: 0.01 X10E9/L (ref 0–0.7)
EOSINOPHILS/100 LEUKOCYTES IN BLOOD BY AUTOMATED COUNT: 0.1 % (ref 0–6)
ERYTHROCYTE DISTRIBUTION WIDTH (RATIO) BY AUTOMATED COUNT: 14.4 % (ref 11.5–14.5)
ERYTHROCYTE MEAN CORPUSCULAR HEMOGLOBIN CONCENTRATION (G/DL) BY AUTOMATED: 30.3 G/DL (ref 32–36)
ERYTHROCYTE MEAN CORPUSCULAR VOLUME (FL) BY AUTOMATED COUNT: 92 FL (ref 80–100)
ERYTHROCYTES (10*6/UL) IN BLOOD BY AUTOMATED COUNT: 3.92 X10E12/L (ref 4–5.2)
GFR FEMALE: 32 ML/MIN/1.73M2
GLUCOSE (MG/DL) IN SER/PLAS: 189 MG/DL (ref 74–99)
HEMATOCRIT (%) IN BLOOD BY AUTOMATED COUNT: 36 % (ref 36–46)
HEMOGLOBIN (G/DL) IN BLOOD: 10.9 G/DL (ref 12–16)
IMMATURE GRANULOCYTES/100 LEUKOCYTES IN BLOOD BY AUTOMATED COUNT: 1.1 % (ref 0–0.9)
INR IN PPP BY COAGULATION ASSAY: 1.3 (ref 0.9–1.1)
LEUKOCYTES (10*3/UL) IN BLOOD BY AUTOMATED COUNT: 17.8 X10E9/L (ref 4.4–11.3)
LYMPHOCYTES (10*3/UL) IN BLOOD BY AUTOMATED COUNT: 2.63 X10E9/L (ref 1.2–4.8)
LYMPHOCYTES/100 LEUKOCYTES IN BLOOD BY AUTOMATED COUNT: 14.8 % (ref 13–44)
MONOCYTES (10*3/UL) IN BLOOD BY AUTOMATED COUNT: 1.91 X10E9/L (ref 0.1–1)
MONOCYTES/100 LEUKOCYTES IN BLOOD BY AUTOMATED COUNT: 10.7 % (ref 2–10)
NEUTROPHILS (10*3/UL) IN BLOOD BY AUTOMATED COUNT: 13.07 X10E9/L (ref 1.2–7.7)
NEUTROPHILS/100 LEUKOCYTES IN BLOOD BY AUTOMATED COUNT: 73.2 % (ref 40–80)
NRBC (PER 100 WBCS) BY AUTOMATED COUNT: 0 /100 WBC (ref 0–0)
PLATELETS (10*3/UL) IN BLOOD AUTOMATED COUNT: 186 X10E9/L (ref 150–450)
POTASSIUM (MMOL/L) IN SER/PLAS: 4.1 MMOL/L (ref 3.5–5.3)
PROTEIN TOTAL: 6.2 G/DL (ref 6.4–8.2)
PROTHROMBIN TIME (PT) IN PPP BY COAGULATION ASSAY: 15.2 SEC (ref 9.8–12.8)
SODIUM (MMOL/L) IN SER/PLAS: 141 MMOL/L (ref 136–145)
UREA NITROGEN (MG/DL) IN SER/PLAS: 50 MG/DL (ref 6–23)

## 2023-09-28 PROCEDURE — 99285 EMERGENCY DEPT VISIT HI MDM: CPT

## 2023-09-28 PROCEDURE — 80053 COMPREHEN METABOLIC PANEL: CPT

## 2023-09-28 PROCEDURE — 93005 ELECTROCARDIOGRAM TRACING: CPT

## 2023-09-28 PROCEDURE — 73130 X-RAY EXAM OF HAND: CPT | Mod: LT

## 2023-09-28 PROCEDURE — 70450 CT HEAD/BRAIN W/O DYE: CPT

## 2023-09-28 PROCEDURE — 96375 TX/PRO/DX INJ NEW DRUG ADDON: CPT

## 2023-09-28 PROCEDURE — 85025 COMPLETE CBC W/AUTO DIFF WBC: CPT

## 2023-09-28 PROCEDURE — 72125 CT NECK SPINE W/O DYE: CPT

## 2023-09-28 PROCEDURE — 73502 X-RAY EXAM HIP UNI 2-3 VIEWS: CPT | Mod: LT

## 2023-09-28 PROCEDURE — 9990 CHARGE CONVERSION

## 2023-09-28 PROCEDURE — 96365 THER/PROPH/DIAG IV INF INIT: CPT

## 2023-09-28 PROCEDURE — 85610 PROTHROMBIN TIME: CPT

## 2023-09-28 RX ORDER — ALBUTEROL SULFATE 0.83 MG/ML
3 SOLUTION RESPIRATORY (INHALATION) AS NEEDED
Status: CANCELLED | OUTPATIENT
Start: 2023-11-08

## 2023-09-28 RX ORDER — EPINEPHRINE 0.3 MG/.3ML
0.3 INJECTION SUBCUTANEOUS EVERY 5 MIN PRN
Status: CANCELLED | OUTPATIENT
Start: 2023-11-08

## 2023-09-28 RX ORDER — DIPHENHYDRAMINE HYDROCHLORIDE 50 MG/ML
50 INJECTION INTRAMUSCULAR; INTRAVENOUS AS NEEDED
Status: CANCELLED | OUTPATIENT
Start: 2023-11-08

## 2023-09-28 RX ORDER — FAMOTIDINE 10 MG/ML
20 INJECTION INTRAVENOUS ONCE AS NEEDED
Status: CANCELLED | OUTPATIENT
Start: 2023-11-08

## 2023-09-29 LAB
ABO GROUP (TYPE) IN BLOOD: NORMAL
ALBUMIN (G/DL) IN SER/PLAS: 3.4 G/DL (ref 3.4–5)
ANION GAP IN SER/PLAS: 9 MMOL/L (ref 10–20)
ANTIBODY SCREEN: NORMAL
APPEARANCE, URINE: CLEAR
ATRIAL RATE: 83 BPM
BILIRUBIN, URINE: NEGATIVE
BLOOD, URINE: NEGATIVE
CALCIUM (MG/DL) IN SER/PLAS: 8.4 MG/DL (ref 8.6–10.3)
CARBON DIOXIDE, TOTAL (MMOL/L) IN SER/PLAS: 30 MMOL/L (ref 21–32)
CHLORIDE (MMOL/L) IN SER/PLAS: 96 MMOL/L (ref 98–107)
COLOR, URINE: YELLOW
CREATININE (MG/DL) IN SER/PLAS: 0.71 MG/DL (ref 0.5–1.05)
ERYTHROCYTE DISTRIBUTION WIDTH (RATIO) BY AUTOMATED COUNT: 14.6 % (ref 11.5–14.5)
ERYTHROCYTE MEAN CORPUSCULAR HEMOGLOBIN CONCENTRATION (G/DL) BY AUTOMATED: 31.5 G/DL (ref 32–36)
ERYTHROCYTE MEAN CORPUSCULAR VOLUME (FL) BY AUTOMATED COUNT: 86 FL (ref 80–100)
ERYTHROCYTES (10*6/UL) IN BLOOD BY AUTOMATED COUNT: 4.27 X10E12/L (ref 4–5.2)
GFR FEMALE: >90 ML/MIN/1.73M2
GLUCOSE (MG/DL) IN SER/PLAS: 75 MG/DL (ref 74–99)
GLUCOSE, URINE: NEGATIVE MG/DL
HEMATOCRIT (%) IN BLOOD BY AUTOMATED COUNT: 36.8 % (ref 36–46)
HEMOGLOBIN (G/DL) IN BLOOD: 11.6 G/DL (ref 12–16)
KETONES, URINE: NEGATIVE MG/DL
LEUKOCYTE ESTERASE, URINE: NEGATIVE
LEUKOCYTES (10*3/UL) IN BLOOD BY AUTOMATED COUNT: 13 X10E9/L (ref 4.4–11.3)
MAGNESIUM (MG/DL) IN SER/PLAS: 1.7 MG/DL (ref 1.6–2.4)
NITRITE, URINE: NEGATIVE
NRBC (PER 100 WBCS) BY AUTOMATED COUNT: 0 /100 WBC (ref 0–0)
P AXIS: 79 DEGREES
P OFFSET: 211 MS
P ONSET: 162 MS
PH, URINE: 6 (ref 5–8)
PHOSPHATE (MG/DL) IN SER/PLAS: 3.1 MG/DL (ref 2.5–4.9)
PLATELETS (10*3/UL) IN BLOOD AUTOMATED COUNT: 258 X10E9/L (ref 150–450)
POTASSIUM (MMOL/L) IN SER/PLAS: 4 MMOL/L (ref 3.5–5.3)
PR INTERVAL: 124 MS
PROTEIN, URINE: NEGATIVE MG/DL
Q ONSET: 224 MS
QRS COUNT: 13 BEATS
QRS DURATION: 66 MS
QT INTERVAL: 342 MS
QTC CALCULATION(BAZETT): 401 MS
QTC FREDERICIA: 381 MS
R AXIS: 41 DEGREES
RH FACTOR: NORMAL
SODIUM (MMOL/L) IN SER/PLAS: 131 MMOL/L (ref 136–145)
SPECIFIC GRAVITY, URINE: 1.01 (ref 1–1.03)
T AXIS: 49 DEGREES
T OFFSET: 395 MS
UREA NITROGEN (MG/DL) IN SER/PLAS: 13 MG/DL (ref 6–23)
UROBILINOGEN, URINE: <2 MG/DL (ref 0–1.9)
VENTRICULAR RATE: 83 BPM

## 2023-09-29 PROCEDURE — 96375 TX/PRO/DX INJ NEW DRUG ADDON: CPT

## 2023-09-29 PROCEDURE — 85025 COMPLETE CBC W/AUTO DIFF WBC: CPT

## 2023-09-29 PROCEDURE — 80069 RENAL FUNCTION PANEL: CPT

## 2023-09-29 PROCEDURE — 93005 ELECTROCARDIOGRAM TRACING: CPT

## 2023-09-29 PROCEDURE — 96365 THER/PROPH/DIAG IV INF INIT: CPT

## 2023-09-29 PROCEDURE — 86850 RBC ANTIBODY SCREEN: CPT

## 2023-09-29 PROCEDURE — 72125 CT NECK SPINE W/O DYE: CPT

## 2023-09-29 PROCEDURE — 73130 X-RAY EXAM OF HAND: CPT | Mod: LT

## 2023-09-29 PROCEDURE — C1713 ANCHOR/SCREW BN/BN,TIS/BN: HCPCS

## 2023-09-29 PROCEDURE — 76000 FLUOROSCOPY <1 HR PHYS/QHP: CPT

## 2023-09-29 PROCEDURE — 81003 URINALYSIS AUTO W/O SCOPE: CPT

## 2023-09-29 PROCEDURE — 85027 COMPLETE CBC AUTOMATED: CPT

## 2023-09-29 PROCEDURE — 83735 ASSAY OF MAGNESIUM: CPT

## 2023-09-29 PROCEDURE — 70450 CT HEAD/BRAIN W/O DYE: CPT

## 2023-09-29 PROCEDURE — 85610 PROTHROMBIN TIME: CPT

## 2023-09-29 PROCEDURE — 73502 X-RAY EXAM HIP UNI 2-3 VIEWS: CPT | Mod: LT

## 2023-09-29 PROCEDURE — 99285 EMERGENCY DEPT VISIT HI MDM: CPT

## 2023-09-29 PROCEDURE — 9990 CHARGE CONVERSION

## 2023-09-29 PROCEDURE — 80053 COMPREHEN METABOLIC PANEL: CPT

## 2023-09-29 PROCEDURE — 86901 BLOOD TYPING SEROLOGIC RH(D): CPT

## 2023-09-29 PROCEDURE — 86900 BLOOD TYPING SEROLOGIC ABO: CPT

## 2023-09-29 PROCEDURE — 0QS706Z REPOSITION LEFT UPPER FEMUR WITH INTRAMEDULLARY INTERNAL FIXATION DEVICE, OPEN APPROACH: ICD-10-PCS | Performed by: ORTHOPAEDIC SURGERY

## 2023-09-29 RX ORDER — OXYCODONE HYDROCHLORIDE 10 MG/1
10 TABLET ORAL EVERY 4 HOURS PRN
Status: DISCONTINUED | OUTPATIENT
Start: 2023-09-30 | End: 2023-10-02 | Stop reason: HOSPADM

## 2023-09-29 RX ORDER — POLYETHYLENE GLYCOL 3350 17 G/17G
17 POWDER, FOR SOLUTION ORAL 2 TIMES DAILY
Status: DISCONTINUED | OUTPATIENT
Start: 2023-09-30 | End: 2023-10-02 | Stop reason: HOSPADM

## 2023-09-29 RX ORDER — HYDROCHLOROTHIAZIDE 25 MG/1
25 TABLET ORAL DAILY
Status: DISCONTINUED | OUTPATIENT
Start: 2023-09-30 | End: 2023-09-29 | Stop reason: HOSPADM

## 2023-09-29 RX ORDER — SODIUM CHLORIDE, SODIUM LACTATE, POTASSIUM CHLORIDE, CALCIUM CHLORIDE 600; 310; 30; 20 MG/100ML; MG/100ML; MG/100ML; MG/100ML
50 INJECTION, SOLUTION INTRAVENOUS CONTINUOUS
Status: ACTIVE | OUTPATIENT
Start: 2023-09-30 | End: 2023-09-30

## 2023-09-29 RX ORDER — CYCLOBENZAPRINE HCL 10 MG
10 TABLET ORAL 3 TIMES DAILY PRN
Status: DISCONTINUED | OUTPATIENT
Start: 2023-09-30 | End: 2023-10-02 | Stop reason: HOSPADM

## 2023-09-29 RX ORDER — OXYCODONE HYDROCHLORIDE 5 MG/1
5 TABLET ORAL EVERY 4 HOURS PRN
Status: DISCONTINUED | OUTPATIENT
Start: 2023-09-30 | End: 2023-10-02 | Stop reason: HOSPADM

## 2023-09-29 RX ORDER — MORPHINE SULFATE 2 MG/ML
2 INJECTION, SOLUTION INTRAMUSCULAR; INTRAVENOUS EVERY 4 HOURS PRN
Status: DISCONTINUED | OUTPATIENT
Start: 2023-09-30 | End: 2023-10-02 | Stop reason: HOSPADM

## 2023-09-29 RX ORDER — ADHESIVE BANDAGE
10 BANDAGE TOPICAL DAILY PRN
Status: DISCONTINUED | OUTPATIENT
Start: 2023-09-30 | End: 2023-09-29 | Stop reason: HOSPADM

## 2023-09-29 RX ORDER — LISINOPRIL 5 MG/1
5 TABLET ORAL DAILY
Status: DISCONTINUED | OUTPATIENT
Start: 2023-09-30 | End: 2023-09-29 | Stop reason: HOSPADM

## 2023-09-29 RX ORDER — PREDNISONE 5 MG/1
5 TABLET ORAL DAILY
Status: DISCONTINUED | OUTPATIENT
Start: 2023-09-30 | End: 2023-09-29 | Stop reason: HOSPADM

## 2023-09-29 RX ORDER — ENOXAPARIN SODIUM 100 MG/ML
30 INJECTION SUBCUTANEOUS 2 TIMES DAILY
Status: DISCONTINUED | OUTPATIENT
Start: 2023-09-30 | End: 2023-10-02 | Stop reason: HOSPADM

## 2023-09-29 RX ORDER — PANTOPRAZOLE SODIUM 20 MG/1
20 TABLET, DELAYED RELEASE ORAL DAILY
Status: DISCONTINUED | OUTPATIENT
Start: 2023-09-30 | End: 2023-09-29 | Stop reason: HOSPADM

## 2023-09-29 RX ORDER — DOCUSATE SODIUM 100 MG/1
100 CAPSULE, LIQUID FILLED ORAL 2 TIMES DAILY
Status: DISCONTINUED | OUTPATIENT
Start: 2023-09-30 | End: 2023-10-02 | Stop reason: HOSPADM

## 2023-09-29 RX ORDER — SODIUM CHLORIDE, SODIUM LACTATE, POTASSIUM CHLORIDE, CALCIUM CHLORIDE 600; 310; 30; 20 MG/100ML; MG/100ML; MG/100ML; MG/100ML
50 INJECTION, SOLUTION INTRAVENOUS CONTINUOUS
Status: DISCONTINUED | OUTPATIENT
Start: 2023-09-30 | End: 2023-09-29 | Stop reason: HOSPADM

## 2023-09-29 RX ORDER — MORPHINE SULFATE 2 MG/ML
2 INJECTION, SOLUTION INTRAMUSCULAR; INTRAVENOUS EVERY 2 HOUR PRN
Status: DISCONTINUED | OUTPATIENT
Start: 2023-09-30 | End: 2023-09-29 | Stop reason: HOSPADM

## 2023-09-29 RX ORDER — CEFAZOLIN SODIUM 2 G/100ML
2 INJECTION, SOLUTION INTRAVENOUS ONCE
Status: COMPLETED | OUTPATIENT
Start: 2023-09-30 | End: 2023-09-30

## 2023-09-29 RX ORDER — MORPHINE SULFATE 2 MG/ML
4 INJECTION, SOLUTION INTRAMUSCULAR; INTRAVENOUS EVERY 2 HOUR PRN
Status: DISCONTINUED | OUTPATIENT
Start: 2023-09-30 | End: 2023-09-29 | Stop reason: HOSPADM

## 2023-09-29 RX ORDER — ONDANSETRON HYDROCHLORIDE 2 MG/ML
4 INJECTION, SOLUTION INTRAVENOUS EVERY 4 HOURS PRN
Status: DISCONTINUED | OUTPATIENT
Start: 2023-09-30 | End: 2023-09-29 | Stop reason: HOSPADM

## 2023-09-29 RX ORDER — ACETAMINOPHEN 325 MG/1
650 TABLET ORAL EVERY 4 HOURS PRN
Status: DISCONTINUED | OUTPATIENT
Start: 2023-09-30 | End: 2023-09-29 | Stop reason: HOSPADM

## 2023-09-29 RX ORDER — ONDANSETRON HYDROCHLORIDE 2 MG/ML
4 INJECTION, SOLUTION INTRAVENOUS EVERY 6 HOURS PRN
Status: DISCONTINUED | OUTPATIENT
Start: 2023-09-30 | End: 2023-10-02 | Stop reason: HOSPADM

## 2023-09-30 PROBLEM — S72.002A CLOSED LEFT HIP FRACTURE (MULTI): Status: ACTIVE | Noted: 2023-09-30

## 2023-09-30 PROBLEM — N17.9 ACUTE KIDNEY INJURY (CMS-HCC): Status: ACTIVE | Noted: 2023-09-30

## 2023-09-30 PROBLEM — D72.829 LEUKOCYTOSIS: Chronic | Status: ACTIVE | Noted: 2023-09-30

## 2023-09-30 PROBLEM — E87.1 HYPONATREMIA: Status: ACTIVE | Noted: 2023-09-30

## 2023-09-30 LAB
ANION GAP SERPL CALC-SCNC: 14 MMOL/L (ref 10–20)
BASOPHILS # BLD AUTO: 0.07 X10*3/UL (ref 0–0.1)
BASOPHILS NFR BLD AUTO: 0.4 %
BUN SERPL-MCNC: 12 MG/DL (ref 6–23)
CALCIUM SERPL-MCNC: 8 MG/DL (ref 8.6–10.3)
CHLORIDE SERPL-SCNC: 95 MMOL/L (ref 98–107)
CO2 SERPL-SCNC: 23 MMOL/L (ref 21–32)
CREAT SERPL-MCNC: 0.67 MG/DL (ref 0.5–1.05)
EOSINOPHIL # BLD AUTO: 0.01 X10*3/UL (ref 0–0.7)
EOSINOPHIL NFR BLD AUTO: 0.1 %
ERYTHROCYTE [DISTWIDTH] IN BLOOD BY AUTOMATED COUNT: 43.8 % (ref 11.5–14.5)
GFR SERPL CREATININE-BSD FRML MDRD: >90 ML/MIN/1.73M*2
GLUCOSE SERPL-MCNC: 72 MG/DL (ref 74–99)
HCT VFR BLD AUTO: 34.1 % (ref 36–46)
HGB BLD-MCNC: 11.1 G/DL (ref 12–16)
IMM GRANULOCYTES # BLD AUTO: 0.12 X10*3/UL (ref 0–0.7)
IMM GRANULOCYTES NFR BLD AUTO: 0.6 % (ref 0–0.9)
LYMPHOCYTES # BLD AUTO: 1.2 X10*3/UL (ref 1.2–4.8)
LYMPHOCYTES NFR BLD AUTO: 6.3 %
MCH RBC QN AUTO: 27.5 PG (ref 26–34)
MCHC RBC AUTO-ENTMCNC: 32.6 G/DL (ref 32–36)
MCV RBC AUTO: 84 FL (ref 80–100)
MONOCYTES # BLD AUTO: 1.92 X10*3/UL (ref 0.1–1)
MONOCYTES NFR BLD AUTO: 10.1 %
NEUTROPHILS # BLD AUTO: 15.73 X10*3/UL (ref 1.2–7.7)
NEUTROPHILS NFR BLD AUTO: 82.5 %
PLATELET # BLD AUTO: 262 X10*3/UL (ref 150–450)
PMV BLD AUTO: 10.7 FL (ref 7.5–11.5)
POTASSIUM SERPL-SCNC: 3.6 MMOL/L (ref 3.5–5.3)
RBC # BLD AUTO: 4.04 X10*6/UL (ref 4–5.2)
SODIUM SERPL-SCNC: 128 MMOL/L (ref 136–145)
WBC # BLD AUTO: 19.1 X10*3/UL (ref 4.4–11.3)

## 2023-09-30 PROCEDURE — 2500000001 HC RX 250 WO HCPCS SELF ADMINISTERED DRUGS (ALT 637 FOR MEDICARE OP): Performed by: STUDENT IN AN ORGANIZED HEALTH CARE EDUCATION/TRAINING PROGRAM

## 2023-09-30 PROCEDURE — 86850 RBC ANTIBODY SCREEN: CPT

## 2023-09-30 PROCEDURE — 85025 COMPLETE CBC W/AUTO DIFF WBC: CPT | Performed by: INTERNAL MEDICINE

## 2023-09-30 PROCEDURE — 99232 SBSQ HOSP IP/OBS MODERATE 35: CPT | Performed by: INTERNAL MEDICINE

## 2023-09-30 PROCEDURE — 36415 COLL VENOUS BLD VENIPUNCTURE: CPT | Performed by: INTERNAL MEDICINE

## 2023-09-30 PROCEDURE — 2500000004 HC RX 250 GENERAL PHARMACY W/ HCPCS (ALT 636 FOR OP/ED): Performed by: INTERNAL MEDICINE

## 2023-09-30 PROCEDURE — 80048 BASIC METABOLIC PNL TOTAL CA: CPT | Performed by: PHYSICIAN ASSISTANT

## 2023-09-30 PROCEDURE — 9990 CHARGE CONVERSION

## 2023-09-30 PROCEDURE — 97166 OT EVAL MOD COMPLEX 45 MIN: CPT | Mod: GO | Performed by: OCCUPATIONAL THERAPIST

## 2023-09-30 PROCEDURE — 2500000004 HC RX 250 GENERAL PHARMACY W/ HCPCS (ALT 636 FOR OP/ED): Performed by: STUDENT IN AN ORGANIZED HEALTH CARE EDUCATION/TRAINING PROGRAM

## 2023-09-30 PROCEDURE — 2580000001 HC RX 258 IV SOLUTIONS: Performed by: STUDENT IN AN ORGANIZED HEALTH CARE EDUCATION/TRAINING PROGRAM

## 2023-09-30 PROCEDURE — 97161 PT EVAL LOW COMPLEX 20 MIN: CPT | Mod: GP

## 2023-09-30 PROCEDURE — 85027 COMPLETE CBC AUTOMATED: CPT

## 2023-09-30 PROCEDURE — 97165 OT EVAL LOW COMPLEX 30 MIN: CPT | Mod: GO | Performed by: OCCUPATIONAL THERAPIST

## 2023-09-30 PROCEDURE — 86901 BLOOD TYPING SEROLOGIC RH(D): CPT

## 2023-09-30 PROCEDURE — 1100000001 HC PRIVATE ROOM DAILY

## 2023-09-30 PROCEDURE — 76000 FLUOROSCOPY <1 HR PHYS/QHP: CPT

## 2023-09-30 PROCEDURE — 36415 COLL VENOUS BLD VENIPUNCTURE: CPT | Performed by: PHYSICIAN ASSISTANT

## 2023-09-30 PROCEDURE — 86900 BLOOD TYPING SEROLOGIC ABO: CPT

## 2023-09-30 PROCEDURE — 83735 ASSAY OF MAGNESIUM: CPT

## 2023-09-30 PROCEDURE — 80069 RENAL FUNCTION PANEL: CPT

## 2023-09-30 RX ORDER — LISINOPRIL 5 MG/1
5 TABLET ORAL DAILY
Status: DISCONTINUED | OUTPATIENT
Start: 2023-10-01 | End: 2023-10-02 | Stop reason: HOSPADM

## 2023-09-30 RX ORDER — SODIUM CHLORIDE 9 MG/ML
100 INJECTION, SOLUTION INTRAVENOUS CONTINUOUS
Status: ACTIVE | OUTPATIENT
Start: 2023-09-30 | End: 2023-10-01

## 2023-09-30 RX ORDER — OMEPRAZOLE 20 MG/1
20 CAPSULE, DELAYED RELEASE ORAL
COMMUNITY

## 2023-09-30 RX ADMIN — OXYCODONE HYDROCHLORIDE 10 MG: 10 TABLET ORAL at 10:28

## 2023-09-30 RX ADMIN — ENOXAPARIN SODIUM 30 MG: 100 INJECTION SUBCUTANEOUS at 09:00

## 2023-09-30 RX ADMIN — ENOXAPARIN SODIUM 30 MG: 100 INJECTION SUBCUTANEOUS at 20:36

## 2023-09-30 RX ADMIN — DOCUSATE SODIUM 100 MG: 100 CAPSULE, LIQUID FILLED ORAL at 20:35

## 2023-09-30 RX ADMIN — OXYCODONE HYDROCHLORIDE 5 MG: 5 TABLET ORAL at 04:30

## 2023-09-30 RX ADMIN — CEFAZOLIN SODIUM 2 G: 2 INJECTION, SOLUTION INTRAVENOUS at 07:00

## 2023-09-30 RX ADMIN — DOCUSATE SODIUM 100 MG: 100 CAPSULE, LIQUID FILLED ORAL at 09:00

## 2023-09-30 RX ADMIN — OXYCODONE HYDROCHLORIDE 10 MG: 10 TABLET ORAL at 15:05

## 2023-09-30 RX ADMIN — SODIUM CHLORIDE, POTASSIUM CHLORIDE, SODIUM LACTATE AND CALCIUM CHLORIDE 50 ML/HR: 600; 310; 30; 20 INJECTION, SOLUTION INTRAVENOUS at 04:30

## 2023-09-30 RX ADMIN — CYCLOBENZAPRINE 10 MG: 10 TABLET, FILM COATED ORAL at 10:28

## 2023-09-30 RX ADMIN — SODIUM CHLORIDE 100 ML/HR: 9 INJECTION, SOLUTION INTRAVENOUS at 20:40

## 2023-09-30 ASSESSMENT — COGNITIVE AND FUNCTIONAL STATUS - GENERAL
TOILETING: A LOT
WALKING IN HOSPITAL ROOM: TOTAL
MOVING FROM LYING ON BACK TO SITTING ON SIDE OF FLAT BED WITH BEDRAILS: TOTAL
MOBILITY SCORE: 6
TURNING FROM BACK TO SIDE WHILE IN FLAT BAD: TOTAL
DAILY ACTIVITIY SCORE: 14
DRESSING REGULAR LOWER BODY CLOTHING: TOTAL
MOVING FROM LYING ON BACK TO SITTING ON SIDE OF FLAT BED WITH BEDRAILS: A LOT
CLIMB 3 TO 5 STEPS WITH RAILING: TOTAL
PERSONAL GROOMING: A LOT
HELP NEEDED FOR BATHING: A LOT
STANDING UP FROM CHAIR USING ARMS: TOTAL
DRESSING REGULAR UPPER BODY CLOTHING: A LITTLE
MOVING TO AND FROM BED TO CHAIR: TOTAL

## 2023-09-30 ASSESSMENT — ACTIVITIES OF DAILY LIVING (ADL): ADL_ASSISTANCE: INDEPENDENT

## 2023-09-30 ASSESSMENT — PAIN - FUNCTIONAL ASSESSMENT
PAIN_FUNCTIONAL_ASSESSMENT: 0-10

## 2023-09-30 ASSESSMENT — PAIN SCALES - GENERAL
PAINLEVEL_OUTOF10: 5 - MODERATE PAIN
PAINLEVEL_OUTOF10: 10 - WORST POSSIBLE PAIN
PAINLEVEL_OUTOF10: 10 - WORST POSSIBLE PAIN
PAINLEVEL_OUTOF10: 0 - NO PAIN
PAINLEVEL_OUTOF10: 9
PAINLEVEL_OUTOF10: 8
PAINLEVEL_OUTOF10: 3

## 2023-09-30 NOTE — CARE PLAN
Problem: Balance  Goal: STG- Patient will be min assist with assistive device dynamic stand task >5 minutes for ADL completion  Outcome: Progressing     Problem: Dressings Lower Extremities  Goal: STG - Patient will complete lower body dressing with min assist with compensatory strategies and AE  Outcome: Progressing     Problem: Mobility  Goal: STG-Patient will be min assist with assistive device functional mobility tasks  Outcome: Progressing     Problem: Transfers  Goal: STG-Patient will min assist with all functional transfers  Outcome: Progressing

## 2023-09-30 NOTE — H&P
"    History of Present Illness:   Admission Reason: hip fracture   HPI:    HPI:  Patient is a 68-year-old woman with a past medical history of hypertension, osteoarthritis and GERD who presented to the ED after mechanical fall at home.  She reports that she was ambulating in her home when she tripped over a box.  She reports that  \"I tried to catch myself in the fall but I should not have done that\".  She reports that she was in her usual state of health prior to fall.  Following the fall she began to experience severe left hip pain.  She denies hitting her head at time of fall.  She denies any recent fever or chills, chest pain, shortness of breath, confusion, dizziness, abdominal pain, nausea or vomiting, bleeding, rashes.     In the ED, VSS.  Labs notable for white count of 17.8-note that patient has chronic leukocytosis as high as 20.  Hemoglobin 10.9.  PK to 1.69 noted. CT head and C-spine negative.  X-ray of left hand showed subtle deformity of the ulnar styloid  with soft tissue 12 swelling-likely subacute.  X-ray of left hip showed acute displaced left femoral intertrochanteric fracture.  Ortho was consulted.  Patient was given IV fluid bolus for PK.  And Ortho plans for surgical intervention in a.m.  Pt admitted for further management.    PMH: as above    Meds: reviewed     Surg hx:Prior finger fracture repair on the left hand after a fall    FH:Noncontributory, reviewed    SH:Lives with .  Current smoker.  Denies EtOH and illicits.    ROS: Detailed ROS reviewed and negative unless stated above     Comorbidities:   Comorbidites:  ·  Comorbid Conditions hypertension     Social History:   Social History:  Smoking Status never smoker (1)              Allergies:  ·  penicillin : Rash  ·  aspirin : Other (Mild)  ·  NSAIDs : Other    Medications Prior to Admission:     esomeprazole 20 mg oral delayed release capsule: 1 cap(s) orally once a day  Tylenol 8 HR Arthritis Pain 650 mg oral tablet, extended " release: 2 tab(s) orally every 8 hours, As Needed - for pain  gabapentin 100 mg oral capsule: 1 cap(s) orally 3 times a day, As Needed - for pain  Remicade 100 mg intravenous injection: 400 milligram(s) intravenous every 6 weeks  predniSONE 5 mg oral tablet: 1 tab(s) orally once a day  lisinopril 5 mg oral tablet: 1 tab(s) orally once a day  hydroCHLOROthiazide 25 mg oral tablet: 1 tab(s) orally once a day.    Objective:     Objective Information:      T   P  R  BP   MAP  SpO2   Value  36  90  18  107/43      100%  Date/Time 9/28 17:26 9/28 20:48 9/28 20:48 9/28 20:48    9/28 20:48  Range  (36C - 36C )  (90 - 93 )  (16 - 18 )  (107 - 155 )/ (43 - 65 )    (94% - 100% )      Pain reported at 9/29 1:37: 8 = Severe    Physical Exam by System:    Constitutional: nad, nontoxic. pleasant, appears  comfortable   Eyes: anicteric sclera, eomi   ENMT: moist oropharynx, no oral lesions noted   Respiratory/Thorax: RA, ctab, nonlabored   Cardiovascular: rrr, no mgr, pulses 2+   Gastrointestinal: +bs,soft,nt,nd, no guarding nor  rebound   Musculoskeletal: no evidence of atrophy, 5/5 strength,  unable to fully examine hip due to pt discomfort/preference   Extremities: no edema   Neurological: orientedx3, sensation intact, no facial  droop, grossly nonfocal   Psychological: calm/cooperative   Skin: no rashes or lesions     Medications:    Medications:          Continuous Medications       --------------------------------    1. Lactated Ringers Infusion:  1000  mL  IntraVenous  <Continuous>         Scheduled Medications       --------------------------------    1. hydroCHLOROthiazide:  25  mg  Oral  Daily    2. Lisinopril:  5  mg  Oral  Daily    3. Pantoprazole:  20  mg  Oral  Daily    4. predniSONE:  5  mg  Oral  Daily         PRN Medications       --------------------------------    1. Acetaminophen:  650  mg  Oral  Every 4 Hours    2. Magnesium Hydroxide Oral Liquid:  10  mL  Oral  Every 24 Hours    3. Morphine Injectable:  2  mg   IntraVenous Push  Every 2 Hours    4. Morphine Injectable:  4  mg  IntraVenous Push  Every 2 Hours    5. Ondansetron Injectable:  4  mg  IntraVenous Push  Every 4 Hours    6. Sodium Chloride 0.9% Injectable Flush:  10  mL  IntraVenous Flush  Every 8 Hours and as Needed        Recent Lab Results:    Results:    CBC: 9/28/2023 17:45              \     Hgb     /                              \     10.9 L    /  WBC  ----------------  Plt               17.8 H    ----------------    186              /     Hct     \                              /     36.0       \            RBC: 3.92 L    MCV: 92     Neutrophil %: 73.2      CMP: 9/28/2023 17:45  NA+        Cl-     BUN  /                         141    105    50 H /  --------------------------------  Glucose                ---------------------------  189 H    K+     HCO3-   Creat \                         4.1    20 L   1.69 H  \           \  T Bili  /                    \  0.5  /  AST  x ---- x ALT        20 x ---- x 15         /  Alk P   \               /  55  \  Calcium : 8.9     Anion Gap : 20     Albumin : 3.6     T Protein : 6.2 L          Coagulation: 9/28/2023 17:45  PT  /                    15.2 H /  -------<    INR          ----------<      1.3 H  PTT\                              \                       EKG sinus, no acute ST changes, QTc 401    Radiology Results:    Results:        Impression:    No acute intracranial abnormality was identified.     Atrophy and nonspecific low-density white matter changes.     No significant interval change in the appearance of the cervical  spine.     Trace arterial vascular calcifications.     MACRO:  None     CT C Spine without Contrast [Sep 28 2023  8:10PM]      Impression:    No acute intracranial abnormality was identified.     Atrophy and nonspecific low-density white matter changes.     No significant interval change in the appearance of the cervical  spine.     Trace arterial vascular calcifications.      MACRO:  None     CT Head without Contrast [Sep 28 2023  7:20PM]      Impression:    Subtle deformity of the ulnar styloid with overlying soft tissue  swelling. This could be subacute to chronic. Correlate with point  tenderness at this level.     Generalized diffuse osteopenia. Degenerative changes as noted above.     MACRO:  None     Xray Hand Min 3 View [Sep 28 2023  6:31PM]      Impression:    Acute displaced left femoral intertrochanteric fracture.     Degenerative changes as described above.     MACRO:  None     Xray Hip 2 View [Sep 28 2023  6:28PM]      Assessment and Plan:   Assessment:    A/P:  Patient is a 68-year-old woman with a past medical history of hypertension, osteoarthritis and GERD who presented to the ED after mechanical fall at home resulting in left acute displaced femoral intertrochanteric fracture.  Ortho following and plans  for operative management.      #Left femoral intertrochanteric fracture sustained after fall  #PK  #Leukocytosis-chronic, remains afebrile  #OA  #HTN    -fracture sustained after mechanical fall at home. Patient denies preceding symptoms.  Denies any cardiac issues.  -RCRI: 3.9% 30-day risk of major cardiac event.  Patient is euvolemic on exam without evidence of new cardiac issues.  No further work-up required prior to Ortho intervention  -NPO  -pain control  -home BP meds held for now as systolics were low 100s on arrival to floor  -trend rfp to monitor creatinine  -pt given dose of cefazolin in the ED in preparation for OR  -no indication for further abx as pt denies infectious symptoms and leukocytosis is chronic  -continue other home meds    Misc  NPO  SCD  PIV    Dispo:pending PT/OT evaluation after OR    CODE STATUS: FULL CODE-discussed on admission        Certification (inpatient): I certify that this individual will require an anticipated TWO midnight hospital stay in order to effectively diagnose, treat, and provide a safe discharge disposition for the above  "acute complaints as well as exacerbation of  known chronic illnesses.        This document was generated in whole or in part using the Dragon One medical voice recognition software and there may be some incorrect words/wording, spelling, or punctuation errors that were not corrected prior to finalization in the medical record.       Electronic Signatures:  Cheryl King)  (Signed 29-Sep-2023 05:10)   Authored: History of Present Illness, Comorbidities,  Social History, Allergies, Medications Prior to Admission, Objective, Assessment and Plan, Note Completion      Last Updated: 29-Sep-2023 05:10 by Cheryl King)    References:  1.  Data Referenced From \"Risk Screen - Adult Emergency\" 28-Sep-2023 17:53   "

## 2023-09-30 NOTE — PROGRESS NOTES
History of Present Illness:  Patient endorses appropriate post-operative pain.  Denies any numbness or tingling.  No chest pain or shortness of breath.    Review of Systems   GENERAL: Negative for malaise, significant weight loss, fever  MUSCULOSKELETAL: see HPI  NEURO:  Negative    Physical Examination:  Vitals:    09/30/23 0800   BP: 131/68   Pulse: 71   Resp: 16   Temp: 36.7 °C (98.1 °F)   SpO2: 96%     Dressing clean, dry and intact  Mild thigh swelling  + Plantar/dorsiflexion  SILT L2-S1  Good cap refill   Neg Low test    Assessment:  Patient status post intertrochanteric fracture and IM nail     Plan:  Analgesia, ice.  Discussed rehab goals.    DVT prophylaxis / Weight bearing status: as tolerated  Discharge planning.

## 2023-09-30 NOTE — CARE PLAN
The patient's goals for the shift include      The clinical goals for the shift include        Problem: Pain - Adult  Goal: Verbalizes/displays adequate comfort level or baseline comfort level  Outcome: Progressing     Problem: Safety - Adult  Goal: Free from fall injury  Outcome: Progressing     Problem: Discharge Planning  Goal: Discharge to home or other facility with appropriate resources  Outcome: Progressing     Problem: Chronic Conditions and Co-morbidities  Goal: Patient's chronic conditions and co-morbidity symptoms are monitored and maintained or improved  Outcome: Progressing     Problem: Pain  Goal: Takes deep breaths with improved pain control throughout the shift  Outcome: Progressing  Goal: Turns in bed with improved pain control throughout the shift  Outcome: Progressing  Goal: Walks with improved pain control throughout the shift  Outcome: Progressing  Goal: Performs ADL's with improved pain control throughout shift  Outcome: Progressing  Goal: Participates in PT with improved pain control throughout the shift  Outcome: Progressing  Goal: Free from opioid side effects throughout the shift  Outcome: Progressing  Goal: Free from acute confusion related to pain meds throughout the shift  Outcome: Progressing     Problem: Dressings Lower Extremities  Goal: STG - Patient will complete lower body dressing with min assist with compensatory strategies and AE  Outcome: Progressing     Problem: Skin  Goal: Decreased wound size/increased tissue granulation at next dressing change  Outcome: Progressing  Goal: Participates in plan/prevention/treatment measures  Outcome: Progressing  Goal: Prevent/manage excess moisture  Outcome: Progressing  Goal: Prevent/minimize sheer/friction injuries  Outcome: Progressing  Goal: Promote/optimize nutrition  Outcome: Progressing  Goal: Promote skin healing  Outcome: Progressing   .

## 2023-09-30 NOTE — DISCHARGE INSTR - OTHER ORDERS
*Call Dr. Moore for any problems and/or concerns.  *Maintain hip precautions  *Weight bearing as tolerated  *You may shower, do not soak or scrub incision; pat dry  *Apply ice to hip as needed to minimize swelling, on 20 minutes, off 20 minutes  *Continue the coughing and deep breathing exercises that you learned in the hospital  *Move around as much as you can tolerate because movement can help you heal and helps prevent stiffness, skin sores, and blood clots    Call Doctor right away for:  *Increased hip pain  *Pain or swelling in your calf of leg  *Fever above 100.4/shaking chills  *Excessive swelling, increased redness or drainage around the incision  *Your incision breaks open  *Chest pain/shortness of breath, call 911    After the procedure it is common to have pain and swelling.      -To help prevent hip dislocation, follow instructions about movement restrictions as told by your physician:  *Do not cross your legs at the knees. To remind yourself about this, you may keep a pillow between your legs while lying in bed  *Do not bend at the hip and waist or bend farther than 90 degrees of flexion.   To avoid bending this far: do not bring your knees higher than your hips, do not  something from the floor while sitting in a chair, avoid sitting in low chairs, used raised toilet seat (if needed), when standing up from a seated position- keep injured leg out in front of you  *Avoid twisting at your waist and reaching across your body to the side of the affected leg  *Avoid rotating your toes inward on the affected leg    *When getting into a car:  1. Raise the seat as high as possible, move the seat as far back as it will go, and recline the upper part of the seat slightly  2. Sit down on the seat with your injured leg extended out of the car  3. Scoot back in the seat as you move the lower half of your body into the car. Try to avoid bumping your foot or leg as you bring it into the car. To make this  motion smooth and easy on a cloth seat, try placing a plastic bag on the seat    *If your physician has prescribed compression stockings please use as directed. These stockings help to prevent blood clots and reduce swelling in your legs  *Continue to do the exercises you learned in the hospital: i.e. ankle pumps  *If you were prescribed a blood thinner (anticoagulant), take it as prescribed    *Do not use any products that contain nicotine or tobacco, such as cigarettes and e-cigarettes. These can delay bone healing. If you need help quitting, ask your healthcare provider    If you are taking prescription pain medication, take actions to prevent or treat constipation.  -drink enough fluids to keep your urine pale yellow  -eat foods that are high in fiber, such as fresh fruits and vegetables, whole grains, and beans  -limit food that are high in fat and processed sugars, such as fried or sweet foods  -take an over the counter or prescribed medicine for constipation

## 2023-09-30 NOTE — PROGRESS NOTES
Tatiana Hagan is a 68 y.o. female on day 2 of admission presenting with Closed left hip fracture (CMS/HCC).      Subjective   Patient is resting comfortably in bed this morning, she denies having any fevers or chills; she actually asked if she had her surgery last night, she woke up feeling like she still had some pain in the hip and vaguely remembered that she had surgery but was unclear; this was clarified with her and she actually was alert and oriented x4, she said that she was feeling much better, she had been able to tolerate breakfast; the software for the [facility] EMR was changed overnight, she did get a generic tray and was able to tolerate that.  Other than the pain that still in the left hip that is much improved after the surgery, she has no other acute complaints today.       Objective     Last Recorded Vitals  /68 (BP Location: Left arm, Patient Position: Lying)   Pulse 71   Temp 36.7 °C (98.1 °F) (Temporal)   Resp 16   Wt 50 kg (110 lb 3.7 oz)   SpO2 96%   Intake/Output last 3 Shifts:    Intake/Output Summary (Last 24 hours) at 9/30/2023 1607  Last data filed at 9/30/2023 1411  Gross per 24 hour   Intake 484.16 ml   Output 325 ml   Net 159.16 ml       Admission Weight  Weight: 50 kg (110 lb 3.7 oz) (09/29/23 1531)    Daily Weight  09/29/23 : 50 kg (110 lb 3.7 oz)    Image Results  Electrocardiogram 12 Lead  Normal sinus rhythm  Normal ECG  When compared with ECG of 13-OCT-2022 14:02,  Premature atrial complexes are no longer Present  Confirmed by HAL Dorantes ([id number]) on 9/29/2023 7:34:57 PM      Physical Exam  Vitals reviewed.   Constitutional:       Appearance: Normal appearance.   HENT:      Head: Normocephalic and atraumatic.      Mouth/Throat:      Mouth: Mucous membranes are dry.      Pharynx: Oropharynx is clear. No oropharyngeal exudate or posterior oropharyngeal erythema.   Eyes:      Conjunctiva/sclera: Conjunctivae normal.      Pupils: Pupils are equal, round, and reactive to light.    Cardiovascular:      Rate and Rhythm: Normal rate and regular rhythm.      Pulses: Normal pulses.      Heart sounds: Normal heart sounds.   Pulmonary:      Effort: Pulmonary effort is normal.      Breath sounds: Normal breath sounds.   Abdominal:      General: Abdomen is flat.      Palpations: Abdomen is soft.   Musculoskeletal:         General: Normal range of motion.      Comments: Left hip with 2 surgical bandages in place over the lateral aspect of the hip, there is no signs of bleeding or induration, bandaging is clean   Skin:     General: Skin is warm and dry.   Neurological:      Mental Status: She is alert.   Psychiatric:         Mood and Affect: Mood normal.         Behavior: Behavior normal.         Thought Content: Thought content normal.         Judgment: Judgment normal.         Relevant Results               Assessment/Plan      Patient is a 68-year-old woman with a past medical history of hypertension, osteoarthritis and GERD who presented to the ED after mechanical fall at home resulting in left acute displaced femoral intertrochanteric fracture.  Ortho following and plans for operative management; now POD #1 without complication, symptoms controlled.    Principal Problem:    Closed left hip fracture (CMS/HCC)  Active Problems:    Leukocytosis    Acute kidney injury (CMS/HCC)    Hyponatremia    CAD (coronary artery disease)    Cigarette nicotine dependence    CMC arthritis    Esophageal reflux    Essential hypertension    Gastroesophageal reflux disease with esophagitis    Hyperlipidemia    Osteoporosis    Polyarticular psoriatic arthritis (CMS/HCC)    Pseudogout    Rheumatoid arthritis (CMS/HCC)    Vitamin D deficiency    Plan:  -Patient stable to remain at the current level of care  -Patient tolerating p.o. diet, although would recommend continuing the IV fluids until she is eating all of her meals  -Analgesia is adequate, ordered per orthopedics, do not recommend any changes  -Bowel regimen as  ordered  -Home blood pressure medications had been held for mild hypotension on arrival, these have been continued  -advance diet to regular  -Lisinopril 5mg can be reordered for tomorrow  -will continue to trend routine labs  -no indication for further abx as pt denies infectious symptoms and leukocytosis is chronic  -continue other home meds    Regular diet  SCD, VTE ppx per ortho  PIV  FULL CODE            Yash Jamil MD

## 2023-09-30 NOTE — PROGRESS NOTES
Occupational Therapy    Evaluation    Patient Name: Tatiana Hagan  MRN: [id number]  Today's Date: 9/30/2023  Time Calculation  Start Time: 1137  Stop Time: 1200  Time Calculation (min): 23 min    Assessment  IP OT Assessment  OT Assessment: Patient presents with decline in ADLs, functional transfers and functional mobility tasks and would benefit from OT during acute stay to maximize functional independence and safety.  Would benefit from moderate intensity OT after acute hospital stay  Prognosis: Good  Evaluation/Treatment Tolerance: Patient limited by fatigue, Patient limited by pain  Medical Staff Made Aware: Yes  Plan:  Treatment Interventions: ADL retraining, Functional transfer training, Patient/family training  OT Frequency: 1 time per day until discharge  OT Discharge Recommendations: Moderate intensity level of continued care    Subjective   Current Problem:  1. Unspecified fracture of left femur, initial encounter for closed fracture (CMS/[facility])        Patient admitted after a fall.  (+) acute displaced left femoral intertrochanteric fracture.  Franciscon had the following completed 9/29/2023:  left hip cephalomedullary nail completed by Dr Quispe.  General:  General  Reason for Referral: fall s/p left IMN  Referred By: Dr Yash Jamil  Past Medical History Relevant to Rehab: PMH: PVD, ARF, psoriatic arthitis  Family/Caregiver Present: No  Prior to Session Communication: Bedside nurse  Patient Position Received: Bed, 3 rail up, Alarm on  Preferred Learning Style: verbal  General Comment: RN cleared patient to work with therapy. Patients brother was present upon entry to room but exited room prior to functional assessment  Precautions:  LE Weight Bearing Status: Weight Bearing as Tolerated (left LE)  Medical Precautions: Fall precautions  Vital Signs:     Pain:  Pain Assessment  Pain Assessment: 0-10  Pain Score: 10 - Worst possible pain  Pain Type: Surgical pain  Pain Location: Hip  Pain Orientation: Left  Patient  was medicated for pain prior to evaluation. Notified RN  Objective   Cognition:  Overall Cognitive Status: Impaired (slow to process, appears groggy)           Home Living:  Home Living Comments: Patient lives in 2 story home with family with 2STE.  Has first floor setup.  Tub shower with grab bar and shower chair.  Owns cane and walker   Prior Function:  Prior Function Comments: Patient was independent with all ADLs, IADLs, and functional mobility tasks  IADL History:     ADL:  LE Dressing Assistance:  (dependent)  LE Dressing Deficit: Steadying, Verbal cueing, Supervision/safety, Increased time to complete  Activity Tolerance:  Endurance: Tolerates less than 10 min exercise, no significant change in vital signs  Bed Mobility/Transfers: Bed Mobility  Bed Mobility: Yes  Bed Mobility 1  Bed Mobility 1: Supine to sitting, Sitting to supine  Level of Assistance 1: Maximum assistance (2 person)  Bed Mobility Comments 1: verbal and tactile cues provided.  Use of draw sheet  Bed Mobility 2  Bed Mobility  2: Rolling right, Rolling left  Level of Assistance 2: Dependent  Bed Mobility Comments 2: 2person assist with min verbal cues for hand placement   and Transfers  Transfer: No    Vision:    Sensation:  Light Touch: No apparent deficits  Strength:     Perception:     Coordination:      Hand Function:     Extremities: RUE   RUE : Within Functional Limits and LUE   LUE: Within Functional Limits      Outcome Measures: [facility] Daily Activity  Putting on and taking off regular lower body clothing: Total  Bathing (including washing, rinsing, drying): A lot  Putting on and taking off regular upper body clothing: A little  Toileting, which includes using toilet, bedpan or urinal: A lot  Taking care of personal grooming such as brushing teeth: A lot  Eating Meals: None  Daily Activity - Total Score: 14      Education Documentation  Body Mechanics, taught by Yamilet Ramsay OT at 9/30/2023  1:02 PM.  Learner: Patient  Readiness:  Acceptance  Method: Explanation  Response: Verbalizes Understanding    ADL Training, taught by Yamilet Ramsay OT at 9/30/2023  1:02 PM.  Learner: Patient  Readiness: Acceptance  Method: Explanation  Response: Verbalizes Understanding    Mobility Training, taught by Yamilet Ramsay OT at 9/30/2023  1:02 PM.  Learner: Patient  Readiness: Acceptance  Method: Explanation  Response: Verbalizes Understanding    Education Comments  No comments found.      Goals:   Encounter Problems       Encounter Problems (Active)       Balance       STG- Patient will be min assist with assistive device dynamic stand task >5 minutes for ADL completion (Progressing)       Start:  09/30/23    Expected End:  10/14/23               Dressings Lower Extremities       STG - Patient will complete lower body dressing with min assist with compensatory strategies and AE (Progressing)       Start:  09/30/23    Expected End:  10/14/23               Mobility       STG-Patient will be min assist with assistive device functional mobility tasks (Progressing)       Start:  09/30/23    Expected End:  10/14/23               Transfers       STG-Patient will min assist with all functional transfers (Progressing)       Start:  09/30/23    Expected End:  10/14/23

## 2023-09-30 NOTE — PROGRESS NOTES
Physical Therapy    Physical Therapy Evaluation    Patient Name: Tatiana Hagan  MRN: [id number]  Today's Date: 9/30/2023   Time Calculation  Start Time: 1138  Stop Time: 1159  Time Calculation (min): 21 min    Assessment/Plan   PT Assessment  PT Assessment Results: Decreased strength, Decreased range of motion, Decreased endurance, Impaired balance, Decreased mobility, Decreased coordination, Decreased safety awareness, Decreased cognition  Rehab Prognosis: Good  Evaluation/Treatment Tolerance: Patient limited by pain  Medical Staff Made Aware: Yes  End of Session Communication: Bedside nurse  Assessment Comment: Pt's functional limitations include bed mobility, transfers, gait and elevations. Pt would benefit from contiued acute care PT during hospital LOS and upon discharge at a moderate level intensity.  End of Session Patient Position: Bed, 3 rail up, Alarm on  IP OR SWING BED PT PLAN  Inpatient or Swing Bed: Inpatient  PT Plan  Treatment/Interventions: Bed mobility, Transfer training, Gait training, Stair training, Balance training, Neuromuscular re-education, Strengthening, Endurance training, Range of motion, Therapeutic exercise, Therapeutic activity, Home exercise program, Positioning, Postural re-education  PT Plan: Skilled PT  PT Frequency: Daily  PT Discharge Recommendations: Moderate intensity level of continued care  Equipment Recommended upon Discharge: Wheeled walker  PT Recommended Transfer Status: Total assist      Subjective   General Visit Information:  General  Reason for Referral: fall s/p LLE IMN  Referred By: Dr. Yash Jamil MD  Prior to Session Communication: Bedside nurse  Patient Position Received: Bed, 3 rail up, Alarm on  Preferred Learning Style: verbal  Home Living:  Home Living  Type of Home: House  Lives With: Siblings, Other (Comment) (autistic son)  Home Adaptive Equipment: Walker rolling or standard, Cane, Crutches  Home Layout: Two level, Able to live on main level with  bedroom/bathroom  Home Access: Stairs to enter without rails, Stairs to enter with rails  Entrance Stairs-Number of Steps: 2 (wiht 1 HR via front; 2 PAPI no HR via garage.)  Bathroom Shower/Tub: Tub/shower unit  Prior Level of Function:  Prior Function Per Pt/Caregiver Report  Level of [place]: Independent with ADLs and functional transfers, Independent with homemaking with ambulation  Receives Help From: Family  ADL Assistance: Independent  Homemaking Assistance: Independent  Ambulatory Assistance: Independent  Precautions:  Precautions  LE Weight Bearing Status: Weight Bearing as Tolerated (LLE)  Medical Precautions: Fall precautions  Vital Signs:       Objective   Pain:  Pain Assessment  Pain Assessment: 0-10  Pain Score: 10 - Worst possible pain  Pain Type: Surgical pain  Pain Location: Hip  Pain Orientation: Left  Cognition:  Cognition  Overall Cognitive Status: Impaired (impaired short term memory, lethargic)    General Assessments:    Activity Tolerance  Endurance: Tolerates less than 10 min exercise, no significant change in vital signs    Static Sitting Balance  Static Sitting-Balance Support: Bilateral upper extremity supported, Feet supported  Static Sitting-Level of Assistance: Close supervision  Static Sitting-Comment/Number of Minutes: 2 min  Dynamic Sitting Balance  Dynamic Sitting-Balance Support: Bilateral upper extremity supported, Feet supported  Dynamic Sitting-Balance:  (scooting)  Dynamic Sitting-Comments: DepAx2    Static Standing Balance  Static Standing-Balance Support:  (Unable to trial today 2/2 pain.)  Functional Assessments:  Bed Mobility  Bed Mobility: Yes  Bed Mobility 1  Bed Mobility 1: Supine to sitting, Sitting to supine  Level of Assistance 1: Maximum verbal cues (x2)  Bed Mobility Comments 1: Verbal/tactile cues for hand placement and advancing LLE towards EOB.  Bed Mobility 2  Bed Mobility  2: Rolling right, Rolling left  Level of Assistance 2: Dependent  Bed Mobility  Comments 2: x2; Cues for hand placement and assist with LE placement.    Transfers  Transfer: No    Ambulation/Gait Training  Ambulation/Gait Training Performed: No    Stairs  Stairs: No  Extremity/Trunk Assessments:  RLE   RLE : Within Functional Limits  LLE   LLE : Exceptions to WFL  Strength LLE  L Hip Flexion: 2-/5  L Knee Flexion: 2-/5  L Knee Extension: 2-/5  L Ankle Dorsiflexion: 3+/5  Outcome Measures:  [facility] Basic Mobility  Turning from your back to your side while in a flat bed without using bedrails: Total  Moving from lying on your back to sitting on the side of a flat bed without using bedrails: Total  Moving to and from bed to chair (including a wheelchair): Total  Standing up from a chair using your arms (e.g. wheelchair or bedside chair): Total  To walk in hospital room: Total  Climbing 3-5 steps with railing: Total  Basic Mobility - Total Score: 6    Encounter Problems       Encounter Problems (Active)       PT Problem       Bed mobility (Progressing)       Start:  09/30/23    Expected End:  10/14/23       Pt will perform supine<>sit with HOB flat, DONYA.         Transfers (Progressing)       Start:  09/30/23    Expected End:  10/14/23       Pt will perform all transfers with LRAD, DONYA, WBAT LLE.         Gait (Progressing)       Start:  09/30/23    Expected End:  10/14/23       Pt will amb 150' with LRAD, DONYA, WBAT LLE, with step-to pattern as needed.         Strength (Progressing)       Start:  09/30/23    Expected End:  10/14/23       Pt will improve gross LLE strength to 3+/5 in order to progress towards functional independence with mobility.            Pain - Adult              Education Documentation  Mobility Training, taught by Althea Dallas, PT at 9/30/2023 12:44 PM.  Learner: Patient  Readiness: Acceptance  Method: Explanation  Response: Verbalizes Understanding, Needs Reinforcement    Education Comments  No comments found.

## 2023-09-30 NOTE — CARE PLAN
Problem: PT Problem  Goal: Bed mobility  Description: Pt will perform supine<>sit with HOB flat, DONYA.  Outcome: Progressing  Goal: Transfers  Description: Pt will perform all transfers with LRAD, DONYA, WBAT LLE.  Outcome: Progressing  Goal: Gait  Description: Pt will amb 150' with LRAD, DONYA, WBAT LLE, with step-to pattern as needed.  Outcome: Progressing  Goal: Strength  Description: Pt will improve gross LLE strength to 3+/5 in order to progress towards functional independence with mobility.  Outcome: Progressing

## 2023-10-01 PROBLEM — N17.9 ACUTE KIDNEY INJURY (CMS-HCC): Status: RESOLVED | Noted: 2023-09-30 | Resolved: 2023-10-01

## 2023-10-01 LAB
ANION GAP SERPL CALC-SCNC: 13 MMOL/L (ref 10–20)
BUN SERPL-MCNC: 12 MG/DL (ref 6–23)
CALCIUM SERPL-MCNC: 7.4 MG/DL (ref 8.6–10.3)
CHLORIDE SERPL-SCNC: 98 MMOL/L (ref 98–107)
CO2 SERPL-SCNC: 24 MMOL/L (ref 21–32)
CREAT SERPL-MCNC: 0.6 MG/DL (ref 0.5–1.05)
ERYTHROCYTE [DISTWIDTH] IN BLOOD BY AUTOMATED COUNT: 14.1 % (ref 11.5–14.5)
GFR SERPL CREATININE-BSD FRML MDRD: >90 ML/MIN/1.73M*2
GLUCOSE SERPL-MCNC: 58 MG/DL (ref 74–99)
HCT VFR BLD AUTO: 28 % (ref 36–46)
HGB BLD-MCNC: 8.9 G/DL (ref 12–16)
MAGNESIUM SERPL-MCNC: 1.76 MG/DL (ref 1.6–2.4)
MCH RBC QN AUTO: 27.2 PG (ref 26–34)
MCHC RBC AUTO-ENTMCNC: 31.8 G/DL (ref 32–36)
MCV RBC AUTO: 86 FL (ref 80–100)
NRBC BLD-RTO: 0 /100 WBCS (ref 0–0)
PLATELET # BLD AUTO: 216 X10*3/UL (ref 150–450)
PMV BLD AUTO: 10.6 FL (ref 7.5–11.5)
POTASSIUM SERPL-SCNC: 3.6 MMOL/L (ref 3.5–5.3)
RBC # BLD AUTO: 3.27 X10*6/UL (ref 4–5.2)
SODIUM SERPL-SCNC: 131 MMOL/L (ref 136–145)
WBC # BLD AUTO: 14.2 X10*3/UL (ref 4.4–11.3)

## 2023-10-01 PROCEDURE — 82374 ASSAY BLOOD CARBON DIOXIDE: CPT | Performed by: INTERNAL MEDICINE

## 2023-10-01 PROCEDURE — 97110 THERAPEUTIC EXERCISES: CPT | Mod: GP

## 2023-10-01 PROCEDURE — 85027 COMPLETE CBC AUTOMATED: CPT | Performed by: INTERNAL MEDICINE

## 2023-10-01 PROCEDURE — 1100000001 HC PRIVATE ROOM DAILY

## 2023-10-01 PROCEDURE — 83735 ASSAY OF MAGNESIUM: CPT | Performed by: INTERNAL MEDICINE

## 2023-10-01 PROCEDURE — 97530 THERAPEUTIC ACTIVITIES: CPT | Mod: GO

## 2023-10-01 PROCEDURE — 36415 COLL VENOUS BLD VENIPUNCTURE: CPT | Performed by: INTERNAL MEDICINE

## 2023-10-01 PROCEDURE — 2500000004 HC RX 250 GENERAL PHARMACY W/ HCPCS (ALT 636 FOR OP/ED): Performed by: STUDENT IN AN ORGANIZED HEALTH CARE EDUCATION/TRAINING PROGRAM

## 2023-10-01 PROCEDURE — 2500000001 HC RX 250 WO HCPCS SELF ADMINISTERED DRUGS (ALT 637 FOR MEDICARE OP): Performed by: STUDENT IN AN ORGANIZED HEALTH CARE EDUCATION/TRAINING PROGRAM

## 2023-10-01 PROCEDURE — 99232 SBSQ HOSP IP/OBS MODERATE 35: CPT | Performed by: INTERNAL MEDICINE

## 2023-10-01 PROCEDURE — 97116 GAIT TRAINING THERAPY: CPT | Mod: GP

## 2023-10-01 RX ADMIN — OXYCODONE HYDROCHLORIDE 5 MG: 5 TABLET ORAL at 09:07

## 2023-10-01 RX ADMIN — CYCLOBENZAPRINE 10 MG: 10 TABLET, FILM COATED ORAL at 15:43

## 2023-10-01 RX ADMIN — ENOXAPARIN SODIUM 30 MG: 100 INJECTION SUBCUTANEOUS at 21:28

## 2023-10-01 RX ADMIN — DOCUSATE SODIUM 100 MG: 100 CAPSULE, LIQUID FILLED ORAL at 21:28

## 2023-10-01 RX ADMIN — DOCUSATE SODIUM 100 MG: 100 CAPSULE, LIQUID FILLED ORAL at 09:07

## 2023-10-01 RX ADMIN — POLYETHYLENE GLYCOL 3350 17 G: 17 POWDER, FOR SOLUTION ORAL at 09:07

## 2023-10-01 RX ADMIN — POLYETHYLENE GLYCOL 3350 17 G: 17 POWDER, FOR SOLUTION ORAL at 21:28

## 2023-10-01 RX ADMIN — ENOXAPARIN SODIUM 30 MG: 100 INJECTION SUBCUTANEOUS at 09:07

## 2023-10-01 RX ADMIN — OXYCODONE HYDROCHLORIDE 5 MG: 5 TABLET ORAL at 21:28

## 2023-10-01 RX ADMIN — OXYCODONE HYDROCHLORIDE 5 MG: 5 TABLET ORAL at 15:43

## 2023-10-01 ASSESSMENT — COGNITIVE AND FUNCTIONAL STATUS - GENERAL
TOILETING: A LITTLE
DAILY ACTIVITIY SCORE: 17
HELP NEEDED FOR BATHING: A LOT
MOVING TO AND FROM BED TO CHAIR: A LOT
PERSONAL GROOMING: A LITTLE
DRESSING REGULAR UPPER BODY CLOTHING: A LITTLE
MOVING FROM LYING ON BACK TO SITTING ON SIDE OF FLAT BED WITH BEDRAILS: A LOT
STANDING UP FROM CHAIR USING ARMS: A LOT
MOBILITY SCORE: 11
TURNING FROM BACK TO SIDE WHILE IN FLAT BAD: A LOT
CLIMB 3 TO 5 STEPS WITH RAILING: TOTAL
DRESSING REGULAR LOWER BODY CLOTHING: A LOT
WALKING IN HOSPITAL ROOM: A LOT

## 2023-10-01 ASSESSMENT — PAIN SCALES - GENERAL
PAINLEVEL_OUTOF10: 5 - MODERATE PAIN
PAINLEVEL_OUTOF10: 5 - MODERATE PAIN
PAINLEVEL_OUTOF10: 8
PAINLEVEL_OUTOF10: 6
PAINLEVEL_OUTOF10: 6
PAINLEVEL_OUTOF10: 0 - NO PAIN
PAINLEVEL_OUTOF10: 8

## 2023-10-01 ASSESSMENT — PAIN - FUNCTIONAL ASSESSMENT
PAIN_FUNCTIONAL_ASSESSMENT: 0-10

## 2023-10-01 ASSESSMENT — PAIN DESCRIPTION - DESCRIPTORS: DESCRIPTORS: ACHING

## 2023-10-01 NOTE — PROGRESS NOTES
Occupational Therapy    Occupational Therapy Treatment    Name: Tatiana Hagan  MRN: [id number]  : 1955  Date: 10/01/23  Time Calculation  Start Time: 0835  Stop Time: 0900  Time Calculation (min): 25 min    Assessment:  Prognosis: Good  Evaluation/Treatment Tolerance: Patient tolerated treatment well  Medical Staff Made Aware: Yes  Plan:       Subjective   General:  OT Last Visit  OT Received On: 10/01/23  General  Reason for Referral: fall s/p left IMN  Referred By: Dr Yash Jamil  Past Medical History Relevant to Rehab: PMH: PVD, ARF, psoriatic arthitis  Family/Caregiver Present: Yes  Co-Treatment: PT  Co-Treatment Reason: Patient safety with transfers  Prior to Session Communication: Bedside nurse  Patient Position Received: Bed, 3 rail up  Preferred Learning Style: verbal  General Comment: RN cleared patient to work with therapy. Patients brother was present upon entry to room but exited room prior to functional assessment  Pain Assessment:  Pain Assessment  Pain Assessment: 0-10  Pain Score: 8  Pain Interventions:  (Nurse aware of patient's pain)     Objective   Activities of Daily Living: Feeding  Feeding Level of Assistance: Minimum assistance                        LE Dressing  LE Dressing: Yes    Toileting  Where Assessed: Toilet    Functional Standing Tolerance:     Bed Mobility/Transfers: Bed Mobility  Bed Mobility: Yes  Bed Mobility 1  Bed Mobility 1: Supine to sitting  Level of Assistance 1: Moderate assistance    Transfers  Transfer: Yes  Transfer 1  Transfer From 1: Sit to  Transfer to 1: Stand  Technique 1: Stand pivot  Transfer Device 1: Walker  Transfer Level of Assistance 1: Minimum assistance, +2  Transfers 2  Transfer From 2: Bed to  Transfer to 2: Chair with arms  Technique 2: Stand pivot  Transfer Device 2: Walker  Transfer Level of Assistance 2: Minimum assistance                               ,               Pain Assessment:  Pain Assessment: 0-10  Pain Score: 8  Pain Interventions:   (Nurse aware of patient's pain)      Patient Name: Tatiana Hagan  MRN: [id number]  Today's Date: 10/01/23  Time Calculation  Start Time: 0835  Stop Time: 0900  Time Calculation (min): 25 min              Current Problem:   1. Unspecified fracture of left femur, initial encounter for closed fracture (CMS/HCC)            Subjective   General Visit Information:     HPI:   Precautions:  Precautions  LE Weight Bearing Status: Weight Bearing as Tolerated (left LE)  Medical Precautions: Fall precautions  Vital Signs:     Pain:  Pain Assessment  Pain Assessment: 0-10  Pain Score: 8  Pain Interventions:  (Nurse aware of patient's pain)  Home Living:  Type of Home: House  Lives With: Siblings, Other (Comment) (autistic son)  Home Adaptive Equipment: Walker rolling or standard, Cane, Crutches  Home Layout: Two level, Able to live on main level with bedroom/bathroom  Home Access: Stairs to enter without rails, Stairs to enter with rails  Entrance Stairs-Number of Steps: 2 (wiht 1 HR via front; 2 PAPI no HR via garage.)  Bathroom Shower/Tub: Tub/shower unit  Home Living Comments: Patient lives in 2 story home with family with 2STE.  Has first floor setup.  Tub shower with grab bar and shower chair.  Owns cane and walker  Prior Level of Function:  Level of [place]: Independent with ADLs and functional transfers, Independent with homemaking with ambulation  Receives Help From: Family  ADL Assistance: Independent  Homemaking Assistance: Independent  Ambulatory Assistance: Independent  Prior Function Comments: Patient was independent with all ADLs, IADLs, and functional mobility tasks    Objective   Cognition:  Overall Cognitive Status: Impaired (slow to process, appears groggy)  Orientation Level: Disoriented to situation          Transfer/Mobility Assessment: Transfer 1  Transfer From 1: Sit to  Transfer to 1: Stand  Technique 1: Stand pivot  Transfer Device 1: Walker  Transfer Level of Assistance 1: Minimum assistance, +2    , ,     ,    ,    ,    ,    ,    ,    ,    ,    ,    ,    ,      ,    ,    , and      Extremities Assessment:  ,  ,  , and      Outcome Measures:  [facility] Daily Activity  Putting on and taking off regular lower body clothing: A lot  Bathing (including washing, rinsing, drying): A lot  Putting on and taking off regular upper body clothing: A little  Toileting, which includes using toilet, bedpan or urinal: A little  Taking care of personal grooming such as brushing teeth: A little  Eating Meals: None  Daily Activity - Total Score: 17                                                                               OP EDUCATION:       Goals:  Encounter Problems       Encounter Problems (Active)       Balance       STG- Patient will be min assist with assistive device dynamic stand task >5 minutes for ADL completion (Progressing)       Start:  09/30/23    Expected End:  10/14/23               Dressings Lower Extremities       STG - Patient will complete lower body dressing with min assist with compensatory strategies and AE (Progressing)       Start:  09/30/23    Expected End:  10/14/23               Mobility       STG-Patient will be min assist with assistive device functional mobility tasks (Progressing)       Start:  09/30/23    Expected End:  10/14/23               Transfers       STG-Patient will min assist with all functional transfers (Progressing)       Start:  09/30/23    Expected End:  10/14/23                                                                                   ,                                                                                                                Pain Assessment:  Pain Assessment: 0-10  Pain Score: 8  Pain Interventions:  (Nurse aware of patient's pain)  Therapy Precautions:         Outcome Measures:  [facility] Daily Activity  Putting on and taking off regular lower body clothing: A lot  Bathing (including washing, rinsing, drying): A lot  Putting on and taking off regular upper body  clothing: A little  Toileting, which includes using toilet, bedpan or urinal: A little  Taking care of personal grooming such as brushing teeth: A little  Eating Meals: None  Daily Activity - Total Score: 17        ,    ,    ,      ,    ,      ,  ,  ,      , ,    ,    ,    ,    ,   ,    , and      Education Documentation  Body Mechanics, taught by JASON Julian at 10/1/2023  9:49 AM.  Learner: Patient  Readiness: Acceptance  Method: Explanation  Response: Verbalizes Understanding  Comment: Patient has her own way of doing things and needs to move at her pace.    ADL Training, taught by JASON Julian at 10/1/2023  9:49 AM.  Learner: Patient  Readiness: Acceptance  Method: Explanation  Response: Verbalizes Understanding  Comment: Patient has her own way of doing things and needs to move at her pace.    Education Comments  No comments found.      Goals:  Encounter Problems       Encounter Problems (Active)       Balance       STG- Patient will be min assist with assistive device dynamic stand task >5 minutes for ADL completion (Progressing)       Start:  09/30/23    Expected End:  10/14/23               Dressings Lower Extremities       STG - Patient will complete lower body dressing with min assist with compensatory strategies and AE (Progressing)       Start:  09/30/23    Expected End:  10/14/23               Mobility       STG-Patient will be min assist with assistive device functional mobility tasks (Progressing)       Start:  09/30/23    Expected End:  10/14/23               Transfers       STG-Patient will min assist with all functional transfers (Progressing)       Start:  09/30/23    Expected End:  10/14/23

## 2023-10-01 NOTE — CARE PLAN
The patient's goals for the shift include      The clinical goals for the shift include pain management      Problem: Pain - Adult  Goal: Verbalizes/displays adequate comfort level or baseline comfort level  Outcome: Progressing     Problem: Safety - Adult  Goal: Free from fall injury  Outcome: Progressing     Problem: Discharge Planning  Goal: Discharge to home or other facility with appropriate resources  Outcome: Progressing     Problem: Chronic Conditions and Co-morbidities  Goal: Patient's chronic conditions and co-morbidity symptoms are monitored and maintained or improved  Outcome: Progressing     Problem: Pain  Goal: Takes deep breaths with improved pain control throughout the shift  Outcome: Progressing  Goal: Turns in bed with improved pain control throughout the shift  Outcome: Progressing  Goal: Walks with improved pain control throughout the shift  Outcome: Progressing  Goal: Performs ADL's with improved pain control throughout shift  Outcome: Progressing  Goal: Participates in PT with improved pain control throughout the shift  Outcome: Progressing  Goal: Free from opioid side effects throughout the shift  Outcome: Progressing  Goal: Free from acute confusion related to pain meds throughout the shift  Outcome: Progressing     Problem: Dressings Lower Extremities  Goal: STG - Patient will complete lower body dressing with min assist with compensatory strategies and AE  Outcome: Progressing     Problem: Skin  Goal: Decreased wound size/increased tissue granulation at next dressing change  Outcome: Progressing  Goal: Participates in plan/prevention/treatment measures  Outcome: Progressing  Goal: Prevent/manage excess moisture  Outcome: Progressing  Goal: Prevent/minimize sheer/friction injuries  Outcome: Progressing  Goal: Promote/optimize nutrition  Outcome: Progressing  Goal: Promote skin healing  Outcome: Progressing     Problem: Fall/Injury  Goal: Not fall by end of shift  Outcome: Progressing  Goal:  Be free from injury by end of the shift  Outcome: Progressing  Goal: Verbalize understanding of personal risk factors for fall in the hospital  Outcome: Progressing  Goal: Verbalize understanding of risk factor reduction measures to prevent injury from fall in the home  Outcome: Progressing  Goal: Use assistive devices by end of the shift  Outcome: Progressing  Goal: Pace activities to prevent fatigue by end of the shift  Outcome: Progressing     Problem: Pain  Goal: My pain/discomfort is manageable  Outcome: Progressing     Problem: Safety  Goal: Patient will be injury free during hospitalization  Outcome: Progressing  Goal: I will remain free of falls  Outcome: Progressing     Problem: Daily Care  Goal: Daily care needs are met  Outcome: Progressing     Problem: Psychosocial Needs  Goal: Demonstrates ability to cope with hospitalization/illness  Outcome: Progressing  Goal: Collaborate with me, my family, and caregiver to identify my specific goals  Outcome: Progressing     Problem: Discharge Barriers  Goal: My discharge needs are met  Outcome: Progressing

## 2023-10-01 NOTE — PROGRESS NOTES
Met with pt to review dc plan. Pt [facility] during eval was 6. Therapy recommends moderate intensity therapy upon dc. Pt states her plan is to dc home with HC despite recommendation. States she can recover on the main floor of the home with bedroom and bathroom available. Has a wc and walker at home. Provided pt with a [facility] SNF and HC choice list. Nursing updated.    Kaylen Castillo RN

## 2023-10-01 NOTE — PROGRESS NOTES
History of Present Illness:  Patient endorses appropriate post-operative pain.  Denies any numbness or tingling.  No chest pain or shortness of breath.    Review of Systems   GENERAL: Negative for malaise, significant weight loss, fever  MUSCULOSKELETAL: see HPI  NEURO:  Negative    Physical Examination:  Vitals:    10/01/23 0800   BP: 122/81   Pulse: (!) 116   Resp: 16   Temp: 37.1 °C (98.8 °F)   SpO2: 95%     Dressing clean, dry and intact  Mild thigh swelling  + Plantar/dorsiflexion  SILT L2-S1  Good cap refill   Neg Low test    Assessment:  Patient status post intertrochanteric fracture and IM nail     Plan:  Analgesia, ice.  Discussed rehab goals.    DVT prophylaxis / Weight bearing status: WBAT  Discharge planning.

## 2023-10-01 NOTE — PROGRESS NOTES
Tatiana Hagan is a 68 y.o. female on day 3 of admission presenting with Closed left hip fracture (CMS/HCC).      Subjective   Patient is resting comfortably in bed this morning, she denies having any fevers or chills; she wanted to still go home, although we discussed the results of her [facility] scores, she understood that going home may not be a safe plan; she is still skeptical on SNF, although she is willing to look at a list and at the very least give another day to see what her progress is tomorrow and if still feeling weak, she would be more willing for SNF.      Objective     Last Recorded Vitals  /81 (BP Location: Left arm, Patient Position: Lying)   Pulse (!) 116   Temp 37.1 °C (98.8 °F) (Temporal)   Resp 16   Wt 50 kg (110 lb 3.7 oz)   SpO2 95%   Intake/Output last 3 Shifts:    Intake/Output Summary (Last 24 hours) at 10/1/2023 1604  Last data filed at 10/1/2023 0900  Gross per 24 hour   Intake 240 ml   Output 750 ml   Net -510 ml       Admission Weight  Weight: 50 kg (110 lb 3.7 oz) (09/29/23 1531)    Daily Weight  09/29/23 : 50 kg (110 lb 3.7 oz)    Image Results  Electrocardiogram 12 Lead  Normal sinus rhythm  Normal ECG  When compared with ECG of 13-OCT-2022 14:02,  Premature atrial complexes are no longer Present  Confirmed by HAL Dorantes ([id number]) on 9/29/2023 7:34:57 PM      Physical Exam  Vitals reviewed.   Constitutional:       Appearance: Normal appearance.   HENT:      Head: Normocephalic and atraumatic.      Mouth/Throat:      Mouth: Mucous membranes are dry.      Pharynx: Oropharynx is clear. No oropharyngeal exudate or posterior oropharyngeal erythema.   Eyes:      Conjunctiva/sclera: Conjunctivae normal.      Pupils: Pupils are equal, round, and reactive to light.   Cardiovascular:      Rate and Rhythm: Normal rate and regular rhythm.      Pulses: Normal pulses.      Heart sounds: Normal heart sounds.   Pulmonary:      Effort: Pulmonary effort is normal.      Breath sounds: Normal breath  sounds.   Abdominal:      General: Abdomen is flat.      Palpations: Abdomen is soft.   Musculoskeletal:         General: Normal range of motion.      Comments: Left hip with 2 surgical bandages in place over the lateral aspect of the hip, there is no signs of bleeding or induration, bandaging is clean   Skin:     General: Skin is warm and dry.   Neurological:      Mental Status: She is alert.   Psychiatric:         Mood and Affect: Mood normal.         Behavior: Behavior normal.         Thought Content: Thought content normal.         Judgment: Judgment normal.         Relevant Results    Scheduled medications  docusate sodium, 100 mg, oral, BID  enoxaparin, 30 mg, subcutaneous, BID  [Held by provider] lisinopril, 5 mg, oral, Daily  polyethylene glycol, 17 g, oral, BID      Continuous medications     PRN medications  PRN medications: cyclobenzaprine, morphine, ondansetron, oxyCODONE, oxyCODONE     Results for orders placed or performed during the hospital encounter of 09/28/23 (from the past 24 hour(s))   CBC   Result Value Ref Range    WBC 14.2 (H) 4.4 - 11.3 x10*3/uL    nRBC 0.0 0.0 - 0.0 /100 WBCs    RBC 3.27 (L) 4.00 - 5.20 x10*6/uL    Hemoglobin 8.9 (L) 12.0 - 16.0 g/dL    Hematocrit 28.0 (L) 36.0 - 46.0 %    MCV 86 80 - 100 fL    MCH 27.2 26.0 - 34.0 pg    MCHC 31.8 (L) 32.0 - 36.0 g/dL    RDW 14.1 11.5 - 14.5 %    Platelets 216 150 - 450 x10*3/uL    MPV 10.6 7.5 - 11.5 fL   Basic Metabolic Panel   Result Value Ref Range    Glucose 58 (L) 74 - 99 mg/dL    Sodium 131 (L) 136 - 145 mmol/L    Potassium 3.6 3.5 - 5.3 mmol/L    Chloride 98 98 - 107 mmol/L    Bicarbonate 24 21 - 32 mmol/L    Anion Gap 13 10 - 20 mmol/L    Urea Nitrogen 12 6 - 23 mg/dL    Creatinine 0.60 0.50 - 1.05 mg/dL    eGFR >90 >60 mL/min/1.73m*2    Calcium 7.4 (L) 8.6 - 10.3 mg/dL   Magnesium   Result Value Ref Range    Magnesium 1.76 1.60 - 2.40 mg/dL         Assessment/Plan      Patient is a 68-year-old woman with a past medical history of  hypertension, osteoarthritis and GERD who presented to the ED after mechanical fall at home resulting in left acute displaced femoral intertrochanteric fracture.  Ortho following and plans for operative management; now POD #1 without complication, symptoms controlled.    Principal Problem:    Closed left hip fracture (CMS/HCC)  Active Problems:    Leukocytosis    Hyponatremia    CAD (coronary artery disease)    Cigarette nicotine dependence    CMC arthritis    Esophageal reflux    Essential hypertension    Gastroesophageal reflux disease with esophagitis    Hyperlipidemia    Osteoporosis    Polyarticular psoriatic arthritis (CMS/HCC)    Pseudogout    Rheumatoid arthritis (CMS/HCC)    Vitamin D deficiency    Plan:  -Patient stable to remain at the current level of care  -Discussed [facility] scores, will monitor an additional day, provide patient with SNF list  -Patient tolerating p.o. diet,   -Analgesia is adequate, ordered per orthopedics, do not recommend any changes  -Bowel regimen as ordered  -Lisinopril 5mg can be reordered for tomorrow  -will continue to trend routine labs  -no indication for further abx as pt denies infectious symptoms and leukocytosis is chronic  -continue other home meds as ordered currently    Regular diet  SCD, VTE ppx per ortho  FULL CODE            Yash Jamil MD

## 2023-10-01 NOTE — PROGRESS NOTES
Physical Therapy    Physical Therapy Treatment    Patient Name: Tatiana Hagan  MRN: [id number]  Today's Date: 10/1/2023  Time Calculation  Start Time: 0836  Stop Time: 0856  Time Calculation (min): 20 min       Assessment/Plan   PT Assessment  PT Assessment Results: Decreased strength, Decreased endurance, Decreased mobility  Rehab Prognosis: Good  Evaluation/Treatment Tolerance: Patient limited by pain  Medical Staff Made Aware: Yes  End of Session Communication: Bedside nurse  Assessment Comment: Pt's functional limitations include bed mobility, transfers, gait and elevations. Pt would benefit from contiued acute care PT during hospital LOS and upon discharge at a moderate level intensity.  End of Session Patient Position: Bed, 3 rail up, Alarm on  PT Plan  Inpatient/Swing Bed or Outpatient: Inpatient  PT Plan  Treatment/Interventions: Bed mobility, Transfer training, Gait training, Stair training, Balance training, Neuromuscular re-education, Strengthening, Endurance training, Range of motion, Therapeutic exercise, Therapeutic activity, Home exercise program, Positioning, Postural re-education  PT Plan: Skilled PT  PT Frequency: Daily  PT Discharge Recommendations: Moderate intensity level of continued care  Equipment Recommended upon Discharge: Wheeled walker  PT Recommended Transfer Status: Total assist      General Visit Information:      General  Co-Treatment: OT  Co-Treatment Reason: safety  Prior to Session Communication: Bedside nurse  Patient Position Received: Bed, 3 rail up    Subjective   Precautions:  Precautions  LE Weight Bearing Status: Weight Bearing as Tolerated  Medical Precautions: Fall precautions  Vital Signs:       Objective   Pain:  Pain Assessment  Pain Assessment: 0-10  Pain Score: 8  Pain Type: Surgical pain  Cognition:     Postural Control:     Extremity/Trunk Assessments:                    Activity Tolerance:     Treatments:  Therapeutic Exercise  Therapeutic Exercise Performed: Yes                              Bed Mobility  Bed Mobility: Yes  Bed Mobility 1  Level of Assistance 1: Moderate assistance  Bed Mobility Comments 1: x2    Ambulation/Gait Training  Ambulation/Gait Training Performed: Yes  Ambulation/Gait Training 1  Surface 1: Level tile  Device 1: Rolling walker  Gait Support Devices: Gait belt  Assistance 1: Moderate assistance  Comments/Distance (ft) 1: turning stesps bed-recliner assist x 2  Transfers  Transfer: Yes  Transfer 1  Transfer From 1: Sit to  Transfer to 1: Stand  Technique 1:  (3 turning steps chair brought up)  Transfer Level of Assistance 1: Moderate assistance                     Outcome Measures:                   [facility] Basic Mobility  Turning from your back to your side while in a flat bed without using bedrails: A lot  Moving from lying on your back to sitting on the side of a flat bed without using bedrails: A lot  Moving to and from bed to chair (including a wheelchair): A lot  Standing up from a chair using your arms (e.g. wheelchair or bedside chair): A lot  To walk in hospital room: A lot  Climbing 3-5 steps with railing: Total  Basic Mobility - Total Score: 11                                                                   Education Documentation  Body Mechanics, taught by Orin Rob PTA at 10/1/2023 10:13 AM.  Learner: Patient  Readiness: Acceptance  Method: Explanation, Demonstration  Response: Needs Reinforcement    ADL Training, taught by Orin Rob PTA at 10/1/2023 10:13 AM.  Learner: Patient  Readiness: Acceptance  Method: Explanation, Demonstration  Response: Needs Reinforcement    Mobility Training, taught by Orin Rob PTA at 10/1/2023 10:13 AM.  Learner: Patient  Readiness: Acceptance  Method: Explanation, Demonstration  Response: Needs Reinforcement    Education Comments  No comments found.        OP EDUCATION:  Education  Individual(s) Educated: Patient    Encounter Problems       Encounter Problems (Active)       Balance        STG- Patient will be min assist with assistive device dynamic stand task >5 minutes for ADL completion (Progressing)       Start:  09/30/23    Expected End:  10/14/23               Mobility       STG-Patient will be min assist with assistive device functional mobility tasks (Progressing)       Start:  09/30/23    Expected End:  10/14/23               PT Problem       Bed mobility (Progressing)       Start:  09/30/23    Expected End:  10/14/23       Pt will perform supine<>sit with HOB flat, DONYA.         Transfers (Progressing)       Start:  09/30/23    Expected End:  10/14/23       Pt will perform all transfers with LRAD, DONYA, WBAT LLE.         Gait (Progressing)       Start:  09/30/23    Expected End:  10/14/23       Pt will amb 150' with LRAD, DONYA, WBAT LLE, with step-to pattern as needed.         Strength (Progressing)       Start:  09/30/23    Expected End:  10/14/23       Pt will improve gross LLE strength to 3+/5 in order to progress towards functional independence with mobility.            Pain - Adult          Transfers       STG-Patient will min assist with all functional transfers (Progressing)       Start:  09/30/23    Expected End:  10/14/23

## 2023-10-01 NOTE — OP NOTE
PROCEDURE DETAILS    Preoperative Diagnosis:  Left hip intertrochanteric fracture  Postoperative Diagnosis:  Left hip intertrochanteric fracture  Surgeon: Tyron Quispe  Resident/Fellow/Other Assistant: Isak Crisostomo    Procedure:  Left hip short cephalomedullary nail    Anesthesia: General  Estimated Blood Loss: 50 ml  Findings: See op report        Operative Report:   Operative findings:  No evidence of pathologic fracture.     Operative Indications:  Patient with a closed proximal femur fracture     Implants:  Synthes TFNa, 130 deg, 11 mm diameter, 85 mm helical blade, 34 mm distal interlocking screw    Operative Procedure:  Patient was met prior to surgery in pre-operative holding.  The appropriate extremity was marked and recent health status was reviewed and we found no contraindication to proceeding with the scheduled procedure.  We again reviewed the risks of infection,  wound issues, deep venous thrombosis, pulmonary embolism, medical complications including cardiac and pulmonary, neurologic and vascular injury.     The patient was then transported to the operating room and underwent general anesthesia.  The patient's ipsilateral leg was placed in the boot and the Warne table was used.  The contralateral leg was flexed and abducted.  Pre-operative images confirmed  appropriate reduction on AP/lateral fluoroscopy with longitudinal traction and internal rotation.  The ipsilateral arm was adducted and secured across the chest.     The hip was prepped and draped in the usual sterile fashion, antibiotics and timeout were done per protocol.  After timeout, we confirmed the appropriate starting point.  A small longitudinal  incision was made proximal to the greater trochanter.  After  the fascia was split a guide pin was placed at the tip of the greater trochanter and along the anterior 1/3rd in the lianna-posterior plane.  The guidepin was advanced.  An entry reamer was then placed.  The nail was then  advanced without issue.  Reaming  was not necessary.    A guidepin was placed into the femoral head using the targeting guide and separate distal longitudinal incision.  The articular surface was not violated.   The pin was placed with an appropriate tip-apex distance.  The length was measured and a step reamer  was placed.  After the step reamer, the helical blade was placed and impacted until it was well seated.  Once the blade was seated the proximal locking cap was tensioned.     A distal interlocking screw was then placed bicortically without any issue.  The guide was removed and final fluoroscopic images were completed.  The wounds were irrigated and a layered closure was performed using 1 vicryl in the fascial layer and 2-0  vicryl in the deep dermal layer and staples in the skin.  An adherent, water proof dressing was placed.  Hemostasis was obtained prior to closure. All counts were reported as correct.  The patient was stable to the post anesthesia care unit.     I was present and participated in the entire procedure. The Physician Assistant participated in all critical elements of the procedure under my direct supervision. The surgical incision was closed by the PA under my indirect supervision. There were no  qualified residents available to assist.    Complications:  none  Estimated blood loss:  50 ml  Specimens:  None      The physician assistant was present for the entire case.  Given the nature of the procedure and disease process a skilled surgical assistant was  necessary for the case.  The assistant was necessary for retraction and helped directly facilitate completion of the surgery.  A certified scrub tech was at the back table managing instruments and supplies for the surgical procedure.  Note Recipients:   Cheryl King MD Sexton-Cicero, Donna, MD Zanotti, Robert, MD - [phone] [Preferred]                        Attestation:   Note Completion:  Attending Attestation I  performed the procedure without a resident         Electronic Signatures:  Tyron Quispe)  (Signed 29-Sep-2023 16:12)   Authored: Post-Operative Note, Chart Review, Note Completion      Last Updated: 29-Sep-2023 16:12 by Tyron Quispe)

## 2023-10-02 VITALS
HEART RATE: 114 BPM | DIASTOLIC BLOOD PRESSURE: 53 MMHG | SYSTOLIC BLOOD PRESSURE: 107 MMHG | BODY MASS INDEX: 18.82 KG/M2 | TEMPERATURE: 98.8 F | WEIGHT: 110.23 LBS | RESPIRATION RATE: 17 BRPM | OXYGEN SATURATION: 97 % | HEIGHT: 64 IN

## 2023-10-02 LAB
ANION GAP SERPL CALC-SCNC: 15 MMOL/L (ref 10–20)
BUN SERPL-MCNC: 11 MG/DL (ref 6–23)
CALCIUM SERPL-MCNC: 7.2 MG/DL (ref 8.6–10.3)
CHLORIDE SERPL-SCNC: 101 MMOL/L (ref 98–107)
CO2 SERPL-SCNC: 21 MMOL/L (ref 21–32)
CREAT SERPL-MCNC: 0.51 MG/DL (ref 0.5–1.05)
ERYTHROCYTE [DISTWIDTH] IN BLOOD BY AUTOMATED COUNT: 14 % (ref 11.5–14.5)
GFR SERPL CREATININE-BSD FRML MDRD: >90 ML/MIN/1.73M*2
GLUCOSE SERPL-MCNC: 56 MG/DL (ref 74–99)
HCT VFR BLD AUTO: 25.1 % (ref 36–46)
HGB BLD-MCNC: 7.9 G/DL (ref 12–16)
MAGNESIUM SERPL-MCNC: 2 MG/DL (ref 1.6–2.4)
MCH RBC QN AUTO: 27.2 PG (ref 26–34)
MCHC RBC AUTO-ENTMCNC: 31.5 G/DL (ref 32–36)
MCV RBC AUTO: 87 FL (ref 80–100)
NRBC BLD-RTO: 0 /100 WBCS (ref 0–0)
PLATELET # BLD AUTO: 236 X10*3/UL (ref 150–450)
PMV BLD AUTO: 10.3 FL (ref 7.5–11.5)
POTASSIUM SERPL-SCNC: 3.6 MMOL/L (ref 3.5–5.3)
RBC # BLD AUTO: 2.9 X10*6/UL (ref 4–5.2)
SODIUM SERPL-SCNC: 133 MMOL/L (ref 136–145)
WBC # BLD AUTO: 14.3 X10*3/UL (ref 4.4–11.3)

## 2023-10-02 PROCEDURE — 80048 BASIC METABOLIC PNL TOTAL CA: CPT | Performed by: INTERNAL MEDICINE

## 2023-10-02 PROCEDURE — 97110 THERAPEUTIC EXERCISES: CPT | Mod: GP

## 2023-10-02 PROCEDURE — 97530 THERAPEUTIC ACTIVITIES: CPT | Mod: GO

## 2023-10-02 PROCEDURE — 2500000004 HC RX 250 GENERAL PHARMACY W/ HCPCS (ALT 636 FOR OP/ED): Performed by: INTERNAL MEDICINE

## 2023-10-02 PROCEDURE — 83735 ASSAY OF MAGNESIUM: CPT | Performed by: INTERNAL MEDICINE

## 2023-10-02 PROCEDURE — 36415 COLL VENOUS BLD VENIPUNCTURE: CPT | Performed by: INTERNAL MEDICINE

## 2023-10-02 PROCEDURE — 99239 HOSP IP/OBS DSCHRG MGMT >30: CPT | Performed by: INTERNAL MEDICINE

## 2023-10-02 PROCEDURE — 85027 COMPLETE CBC AUTOMATED: CPT | Performed by: INTERNAL MEDICINE

## 2023-10-02 PROCEDURE — 2500000001 HC RX 250 WO HCPCS SELF ADMINISTERED DRUGS (ALT 637 FOR MEDICARE OP): Performed by: STUDENT IN AN ORGANIZED HEALTH CARE EDUCATION/TRAINING PROGRAM

## 2023-10-02 PROCEDURE — 97116 GAIT TRAINING THERAPY: CPT | Mod: GP

## 2023-10-02 PROCEDURE — 97535 SELF CARE MNGMENT TRAINING: CPT | Mod: GO

## 2023-10-02 PROCEDURE — 2500000004 HC RX 250 GENERAL PHARMACY W/ HCPCS (ALT 636 FOR OP/ED): Performed by: STUDENT IN AN ORGANIZED HEALTH CARE EDUCATION/TRAINING PROGRAM

## 2023-10-02 RX ORDER — POLYETHYLENE GLYCOL 3350 17 G/17G
17 POWDER, FOR SOLUTION ORAL 2 TIMES DAILY
Qty: 30 PACKET | Refills: 0
Start: 2023-10-02 | End: 2023-10-02 | Stop reason: SDUPTHER

## 2023-10-02 RX ORDER — OXYCODONE HYDROCHLORIDE 10 MG/1
10 TABLET ORAL EVERY 4 HOURS PRN
Qty: 10 TABLET | Refills: 0 | Status: SHIPPED | OUTPATIENT
Start: 2023-10-02 | End: 2023-10-05

## 2023-10-02 RX ORDER — OXYCODONE HYDROCHLORIDE 5 MG/1
5 TABLET ORAL EVERY 4 HOURS PRN
Qty: 15 TABLET | Refills: 0 | Status: SHIPPED | OUTPATIENT
Start: 2023-10-02 | End: 2023-10-05

## 2023-10-02 RX ORDER — INFLIXIMAB 100 MG/10ML
INJECTION, POWDER, LYOPHILIZED, FOR SOLUTION INTRAVENOUS
Qty: 400 MG | Refills: 2 | OUTPATIENT
Start: 2023-10-02

## 2023-10-02 RX ORDER — ENOXAPARIN SODIUM 100 MG/ML
30 INJECTION SUBCUTANEOUS 2 TIMES DAILY
Qty: 60 EACH | Refills: 0
Start: 2023-10-02 | End: 2023-10-02 | Stop reason: SDUPTHER

## 2023-10-02 RX ORDER — SODIUM CHLORIDE 9 MG/ML
100 INJECTION, SOLUTION INTRAVENOUS CONTINUOUS
Status: DISCONTINUED | OUTPATIENT
Start: 2023-10-02 | End: 2023-10-02 | Stop reason: HOSPADM

## 2023-10-02 RX ORDER — DOCUSATE SODIUM 100 MG/1
100 CAPSULE, LIQUID FILLED ORAL 2 TIMES DAILY
Qty: 60 CAPSULE | Refills: 0 | Status: SHIPPED | OUTPATIENT
Start: 2023-10-02 | End: 2023-10-02 | Stop reason: SDUPTHER

## 2023-10-02 RX ORDER — CYCLOBENZAPRINE HCL 10 MG
10 TABLET ORAL 3 TIMES DAILY PRN
Refills: 1
Start: 2023-10-02 | End: 2023-10-02 | Stop reason: SDUPTHER

## 2023-10-02 RX ORDER — DOCUSATE SODIUM 100 MG/1
100 CAPSULE, LIQUID FILLED ORAL 2 TIMES DAILY
Start: 2023-10-02 | End: 2023-11-29 | Stop reason: WASHOUT

## 2023-10-02 RX ORDER — POLYETHYLENE GLYCOL 3350 17 G/17G
17 POWDER, FOR SOLUTION ORAL DAILY
Qty: 30 PACKET | Refills: 0
Start: 2023-10-02 | End: 2023-11-01

## 2023-10-02 RX ORDER — ENOXAPARIN SODIUM 100 MG/ML
30 INJECTION SUBCUTANEOUS 2 TIMES DAILY
Qty: 60 EACH | Refills: 0
Start: 2023-10-02 | End: 2023-11-01

## 2023-10-02 RX ORDER — CYCLOBENZAPRINE HCL 10 MG
10 TABLET ORAL 3 TIMES DAILY PRN
Refills: 1
Start: 2023-10-02 | End: 2023-11-29 | Stop reason: WASHOUT

## 2023-10-02 RX ADMIN — ENOXAPARIN SODIUM 30 MG: 100 INJECTION SUBCUTANEOUS at 09:42

## 2023-10-02 RX ADMIN — DOCUSATE SODIUM 100 MG: 100 CAPSULE, LIQUID FILLED ORAL at 09:42

## 2023-10-02 RX ADMIN — OXYCODONE HYDROCHLORIDE 10 MG: 10 TABLET ORAL at 15:51

## 2023-10-02 RX ADMIN — SODIUM CHLORIDE 100 ML/HR: 9 INJECTION, SOLUTION INTRAVENOUS at 09:43

## 2023-10-02 RX ADMIN — POLYETHYLENE GLYCOL 3350 17 G: 17 POWDER, FOR SOLUTION ORAL at 09:43

## 2023-10-02 ASSESSMENT — PAIN SCALES - GENERAL
PAINLEVEL_OUTOF10: 8
PAINLEVEL_OUTOF10: 7
PAINLEVEL_OUTOF10: 9

## 2023-10-02 ASSESSMENT — COGNITIVE AND FUNCTIONAL STATUS - GENERAL
WALKING IN HOSPITAL ROOM: A LOT
DAILY ACTIVITIY SCORE: 15
PERSONAL GROOMING: A LITTLE
MOVING FROM LYING ON BACK TO SITTING ON SIDE OF FLAT BED WITH BEDRAILS: A LOT
STANDING UP FROM CHAIR USING ARMS: A LOT
MOVING TO AND FROM BED TO CHAIR: A LOT
DRESSING REGULAR LOWER BODY CLOTHING: A LOT
HELP NEEDED FOR BATHING: A LOT
DRESSING REGULAR UPPER BODY CLOTHING: A LITTLE
MOBILITY SCORE: 12
CLIMB 3 TO 5 STEPS WITH RAILING: A LOT
TURNING FROM BACK TO SIDE WHILE IN FLAT BAD: A LOT
TOILETING: TOTAL

## 2023-10-02 ASSESSMENT — ACTIVITIES OF DAILY LIVING (ADL): HOME_MANAGEMENT_TIME_ENTRY: 11

## 2023-10-02 ASSESSMENT — PAIN - FUNCTIONAL ASSESSMENT
PAIN_FUNCTIONAL_ASSESSMENT: 0-10
PAIN_FUNCTIONAL_ASSESSMENT: 0-10

## 2023-10-02 ASSESSMENT — PAIN DESCRIPTION - DESCRIPTORS
DESCRIPTORS: ACHING
DESCRIPTORS: BURNING

## 2023-10-02 NOTE — PROGRESS NOTES
History of Present Illness:  Patient endorses appropriate post-operative pain.  Denies any numbness or tingling.  No chest pain or shortness of breath.    Review of Systems   GENERAL: Negative for malaise, significant weight loss, fever  MUSCULOSKELETAL: see HPI  NEURO:  Negative    Physical Examination:  Vitals:    10/02/23 0000   BP: 111/69   Pulse: (!) 112   Resp: 18   Temp: 37.8 °C (100 °F)   SpO2: 96%     Dressing clean, dry and intact  Mild thigh swelling  + Plantar/dorsiflexion  SILT L2-S1  Good cap refill   Neg Low test    Assessment:  Patient status post left intertrochanteric fracture and IM nail     Plan:  Analgesia, ice.  Discussed rehab goals.    DVT prophylaxis / Weight bearing status: per orders  Discharge planning.

## 2023-10-02 NOTE — PROGRESS NOTES
[facility] asked to send GF and 7000. Transport arranged for 1500 to [facility]. Facility notified. Patient wants to notify her spouse.      Johnna Barfield RN

## 2023-10-02 NOTE — PROGRESS NOTES
Occupational Therapy    Occupational Therapy Treatment    Name: Tatiana Hagan  MRN: [id number]  : 1955  Date: 10/02/23  Time Calculation  Start Time: 941  Stop Time:   Time Calculation (min): 41 min    Assessment:     Plan:       Subjective   General:  OT Last Visit  OT Received On: 10/02/23  General  Reason for Referral: fall s/p left IMN  Referred By: Dr Yash Jamil  Past Medical History Relevant to Rehab: PMH: PVD, ARF, psoriatic arthitis  Family/Caregiver Present: Yes  Co-Treatment: OT  Co-Treatment Reason: safety  Prior to Session Communication: Bedside nurse  Patient Position Received: Up in chair  Preferred Learning Style: verbal  General Comment: RN cleared patient to work with therapy. Patients brother was present upon entry to room but exited room prior to functional assessment  Pain Assessment:  Pain Assessment  Pain Assessment: 0-10  Pain Score: 9  Pain Type: Surgical pain  Pain Location: Hip  Pain Descriptors: Aching     Objective   Activities of Daily Living: Grooming  Grooming Level of Assistance: Setup, Distant supervision  Grooming Where Assessed: Chair  Grooming Comments: unable to tolerate standing at sink at this time and perfomed at seated level in chair.    Functional Standing Tolerance:     Bed Mobility/Transfers: Bed Mobility 1  Bed Mobility 1: Sitting to supine  Level of Assistance 1: Moderate assistance, Moderate tactile cues    Transfers  Transfer: Yes  Transfer 1  Transfer From 1: Chair with arms to  Transfer to 1: Bed  Technique 1: Stand pivot  Transfer Device 1: Gait belt, Walker  Transfer Level of Assistance 1: Moderate assistance  Trials/Comments 1: Pt performed multiple STS Mod A, attempted to ambulate from chair>bed, increased time to advance LEs, cues for weight shifting and sequencing, increased pain and chair borught up to sit. Pt performed stand pivot from chair>bed.                    IADL's:   Communication:     Splinting:     Therapy/Activity: Therapeutic  Activity  Therapeutic Activity Performed: Yes  Sensory:     Cognitive Skill Development:     Vision:     Strength:     Other Activity:     Home environment:           Outcome Measures:  [facility] Daily Activity  Putting on and taking off regular lower body clothing: A lot  Bathing (including washing, rinsing, drying): A lot  Putting on and taking off regular upper body clothing: A little  Toileting, which includes using toilet, bedpan or urinal: Total  Taking care of personal grooming such as brushing teeth: A little  Eating Meals: None  Daily Activity - Total Score: 15        Education Documentation  Body Mechanics, taught by JASON Morgan at 10/2/2023 11:09 AM.  Learner: Patient  Readiness: Acceptance  Method: Explanation, Demonstration  Response: Verbalizes Understanding, Demonstrated Understanding    ADL Training, taught by JASON Mrogan at 10/2/2023 11:09 AM.  Learner: Patient  Readiness: Acceptance  Method: Explanation, Demonstration  Response: Verbalizes Understanding, Demonstrated Understanding    Education Comments  No comments found.      Goals:  Encounter Problems       Encounter Problems (Active)       Balance       STG- Patient will be min assist with assistive device dynamic stand task >5 minutes for ADL completion (Progressing)       Start:  09/30/23    Expected End:  10/14/23               Dressings Lower Extremities       STG - Patient will complete lower body dressing with min assist with compensatory strategies and AE (Progressing)       Start:  09/30/23    Expected End:  10/14/23               Mobility       STG-Patient will be min assist with assistive device functional mobility tasks (Progressing)       Start:  09/30/23    Expected End:  10/14/23               Transfers       STG-Patient will min assist with all functional transfers (Progressing)       Start:  09/30/23    Expected End:  10/14/23

## 2023-10-02 NOTE — DISCHARGE INSTRUCTIONS
"  #1  It was a pleasure to meet you in the hospital  #2  You sustained a fall that was resultant and a fracture of your left hip  #3 you underwent an intraoperative nail fixation with Dr. Moore that was uncomplicated  #4 you tolerated this procedure well, however it was difficult for you to bear weight on your left leg after the surgery, when working with physical and occupational therapy it was determined you would be better served to go to a skilled nursing facility prior to discharging to home; we discussed this in detail at the bedside on 10/1 and 10/2/2023 and you are amenable to going to a facility in [place]  #5 you have been treated with opiate analgesics, or pain control medications, along with \"bowel regimen\" to prevent constipation related to these medications; he will not continue to need to take these long-term, however they will help alleviate any discomfort as you are going through your recovery, a short-term prescription has been provided to you prior to your discharge to the skilled nursing facility  #6 you will follow-up with your orthopedic surgeon Dr. Moore within 2 weeks of discharge from the hospital as well as your primary care physician  #7 none of your other home medications have been changed  "

## 2023-10-02 NOTE — PROGRESS NOTES
Physical Therapy    Physical Therapy Treatment    Patient Name: Tatiana Hagan  MRN: [id number]  Today's Date: 10/2/2023  Time Calculation  Start Time: 0832  Stop Time: 0907  Time Calculation (min): 35 min       Assessment/Plan   PT Assessment  PT Assessment Results: Decreased strength, Decreased range of motion, Decreased endurance, Decreased mobility  Rehab Prognosis: Good  Evaluation/Treatment Tolerance: Patient limited by pain  Medical Staff Made Aware: Yes  End of Session Communication: Bedside nurse  Assessment Comment: Pt's functional limitations include bed mobility, transfers, gait and elevations. Pt would benefit from contiued acute care PT during hospital LOS and upon discharge at a moderate level intensity.  End of Session Patient Position: Bed, 3 rail up, Alarm on  PT Plan  Inpatient/Swing Bed or Outpatient: Inpatient  PT Plan  Treatment/Interventions: Bed mobility, Transfer training, Gait training, Stair training, Balance training, Neuromuscular re-education, Strengthening, Endurance training, Range of motion, Therapeutic exercise, Therapeutic activity, Home exercise program, Positioning, Postural re-education  PT Plan: Skilled PT  PT Frequency: Daily  PT Discharge Recommendations: Moderate intensity level of continued care  Equipment Recommended upon Discharge: Wheeled walker  PT Recommended Transfer Status: Total assist      General Visit Information:   PT  Visit  PT Received On: 10/02/23  General  Co-Treatment: OT  Co-Treatment Reason: safety  Prior to Session Communication: Bedside nurse  Patient Position Received: Bed, 3 rail up    Subjective   Precautions:  Precautions  LE Weight Bearing Status: Weight Bearing as Tolerated  Medical Precautions: Fall precautions  Vital Signs:       Objective   Pain:  Pain Assessment  Pain Assessment: 0-10  Pain Score: 8  Pain Type: Surgical pain  Pain Location: Hip  Pain Descriptors: Burning  Cognition:     Postural Control:     Extremity/Trunk Assessments:                     Activity Tolerance:     Treatments:  Therapeutic Exercise  Therapeutic Exercise Performed: Yes  Therapeutic Exercise Activity 1: AP,QS,GS HIP ABD, LAQ,MARCHING X 20 B ASSIST FOR ACTIVE EXERCISES  L                             Bed Mobility  Bed Mobility: Yes  Bed Mobility 1  Bed Mobility 1: Supine to sitting  Level of Assistance 1: Minimum assistance, Moderate assistance  Bed Mobility Comments 1: x2  Bed Mobility 2  Bed Mobility  2: Sitting to supine  Level of Assistance 2: Minimum assistance, Moderate assistance, Maximum assistance    Ambulation/Gait Training  Ambulation/Gait Training Performed: Yes  Ambulation/Gait Training 1  Surface 1: Level tile  Device 1: Rolling walker  Gait Support Devices: Gait belt  Assistance 1: Moderate assistance (X2)  Comments/Distance (ft) 1: 3TURNING STEPS, 5 TURNING STEPS  Transfers  Transfer: Yes  Transfer 1  Transfer From 1: Bed to, Commode-standard to  Transfer to 1: Commode-standard, Chair with arms  Technique 1: Sit to stand  Transfer Level of Assistance 1: Moderate assistance                     Outcome Measures:             [facility] Basic Mobility  Turning from your back to your side while in a flat bed without using bedrails: A lot  Moving from lying on your back to sitting on the side of a flat bed without using bedrails: A lot  Moving to and from bed to chair (including a wheelchair): A lot  Standing up from a chair using your arms (e.g. wheelchair or bedside chair): A lot  To walk in hospital room: A lot  Climbing 3-5 steps with railing: A lot  Basic Mobility - Total Score: 12                                                                   Education Documentation  Home Exercise Program, taught by Orin Rob PTA at 10/2/2023 10:41 AM.  Learner: Patient  Readiness: Acceptance  Method: Explanation  Response: Verbalizes Understanding    Mobility Training, taught by Orin Rob PTA at 10/2/2023 10:41 AM.  Learner: Patient  Readiness: Acceptance  Method:  Explanation  Response: Verbalizes Understanding    Education Comments  No comments found.        OP EDUCATION:  Education  Individual(s) Educated: Patient    Encounter Problems       Encounter Problems (Active)       Balance       STG- Patient will be min assist with assistive device dynamic stand task >5 minutes for ADL completion (Progressing)       Start:  09/30/23    Expected End:  10/14/23               Mobility       STG-Patient will be min assist with assistive device functional mobility tasks (Progressing)       Start:  09/30/23    Expected End:  10/14/23               PT Problem       Bed mobility (Progressing)       Start:  09/30/23    Expected End:  10/14/23       Pt will perform supine<>sit with HOB flat, DONYA.         Transfers (Progressing)       Start:  09/30/23    Expected End:  10/14/23       Pt will perform all transfers with LRAD, DONYA, WBAT LLE.         Gait (Progressing)       Start:  09/30/23    Expected End:  10/14/23       Pt will amb 150' with LRAD, DONYA, WBAT LLE, with step-to pattern as needed.         Strength (Progressing)       Start:  09/30/23    Expected End:  10/14/23       Pt will improve gross LLE strength to 3+/5 in order to progress towards functional independence with mobility.            Pain - Adult          Transfers       STG-Patient will min assist with all functional transfers (Progressing)       Start:  09/30/23    Expected End:  10/14/23

## 2023-10-02 NOTE — PROGRESS NOTES
Pt changed FOC to [facility]. They can accept. DCS asked to send GF and 7000. Transport scheluled for 1600. Facility and patient aware. Pt will notify her family.    Johnna Barfield RN

## 2023-10-03 ENCOUNTER — APPOINTMENT (OUTPATIENT)
Dept: INFUSION THERAPY | Facility: CLINIC | Age: 68
End: 2023-10-03
Payer: MEDICARE

## 2023-10-04 LAB
ANION GAP IN SER/PLAS: NORMAL
BASOPHILS (10*3/UL) IN BLOOD BY AUTOMATED COUNT: NORMAL
BASOPHILS/100 LEUKOCYTES IN BLOOD BY AUTOMATED COUNT: NORMAL
CALCIUM (MG/DL) IN SER/PLAS: NORMAL
CARBON DIOXIDE, TOTAL (MMOL/L) IN SER/PLAS: NORMAL
CHLORIDE (MMOL/L) IN SER/PLAS: NORMAL
CREATININE (MG/DL) IN SER/PLAS: NORMAL
EOSINOPHILS (10*3/UL) IN BLOOD BY AUTOMATED COUNT: NORMAL
EOSINOPHILS/100 LEUKOCYTES IN BLOOD BY AUTOMATED COUNT: NORMAL
ERYTHROCYTE DISTRIBUTION WIDTH (RATIO) BY AUTOMATED COUNT: NORMAL
ERYTHROCYTE MEAN CORPUSCULAR HEMOGLOBIN CONCENTRATION (G/DL) BY AUTOMATED: NORMAL
ERYTHROCYTE MEAN CORPUSCULAR VOLUME (FL) BY AUTOMATED COUNT: NORMAL
ERYTHROCYTES (10*6/UL) IN BLOOD BY AUTOMATED COUNT: NORMAL
GFR FEMALE: NORMAL
GFR MALE: NORMAL
GLOMERULAR FILTRATION RATE-AFRICAN AMERICAN: NORMAL ML/MIN/1.73M2
GLOMERULAR FILTRATION RATE-NON AFRICAN AMERICAN: NORMAL ML/MIN/1.73M2
GLUCOSE (MG/DL) IN SER/PLAS: NORMAL
HEMATOCRIT (%) IN BLOOD BY AUTOMATED COUNT: NORMAL
HEMOGLOBIN (G/DL) IN BLOOD: NORMAL
IMMATURE GRANULOCYTES (10*3/UL) ABSOLUTE: NORMAL X10E9/L
IMMATURE GRANULOCYTES/100 LEUKOCYTES IN BLOOD BY AUTOMATED COUNT: NORMAL
LEUKOCYTES (10*3/UL) IN BLOOD BY AUTOMATED COUNT: NORMAL
LYMPHOCYTES (10*3/UL) IN BLOOD BY AUTOMATED COUNT: NORMAL
LYMPHOCYTES/100 LEUKOCYTES IN BLOOD BY AUTOMATED COUNT: NORMAL
MANUAL DIFFERENTIAL Y/N: NORMAL
MONOCYTES (10*3/UL) IN BLOOD BY AUTOMATED COUNT: NORMAL
MONOCYTES/100 LEUKOCYTES IN BLOOD BY AUTOMATED COUNT: NORMAL
NEUTROPHILS (10*3/UL) IN BLOOD BY AUTOMATED COUNT: NORMAL
NEUTROPHILS/100 LEUKOCYTES IN BLOOD BY AUTOMATED COUNT: NORMAL
NRBC (PER 100 WBCS) BY AUTOMATED COUNT: NORMAL
PLATELETS (10*3/UL) IN BLOOD AUTOMATED COUNT: NORMAL
POTASSIUM (MMOL/L) IN SER/PLAS: NORMAL
SODIUM (MMOL/L) IN SER/PLAS: NORMAL
UREA NITROGEN (MG/DL) IN SER/PLAS: NORMAL

## 2023-10-10 ENCOUNTER — APPOINTMENT (OUTPATIENT)
Dept: INFUSION THERAPY | Facility: CLINIC | Age: 68
End: 2023-10-10
Payer: MEDICARE

## 2023-10-12 NOTE — CONSULTS
Service:   Service: Orthopaedics     Consult:  Consult requested by (Attending Name): Yash Jamil   Reason: L femur     History of Present Illness:   HPI:    Orthopaedic Surgery Consultation:      Patient is a 68-year-old female history of hypertension who sustained a mechanical fall yesterday and was diagnosed with a left intertrochanteric fracture.  She had some minor hand pain denies any other significant injuries.  Currently endorsing moderate  pain at rest severe pain with any activity, no numbness or tingling.    She ambulates without an assistive device.  Lives with her  is the primary caregiver for her autistic child.    Review of Systems:  Constitutional: NEGATIVE: Fever, Chills, Anorexia, Weight Loss, Malaise  Eyes: NEGATIVE: Blurry Vision, Drainage, Diploplia, Redness, Vision Loss/ Change  ENMT: NEGATIVE: Nasal Discharge, Nasal Congestion, Ear Pain, Mouth Pain, Throat Pain  Respiratory: NEGATIVE: Dry Cough, Productive Cough, Hemoptysis, Wheezing, Shortness of Breath  Cardiac: NEGATIVE: Chest Pain, Dyspnea on Exertion, Orthopnea, Palpitations, Syncope  Gastrointestinal: NEGATIVE: Nausea, Vomiting, Diarrhea, Constipation, Abdominal Pain  Genitourinary: NEGATIVE: Discharge, Dysuria, Flank Pain, Frequency, Hematuria  Musculoskeletal: POSITIVE: Decreased ROM, Pain, Stiffness, Weakness;   Neurological: NEGATIVE: Dizziness, Confusion, Headache, Seizures, Syncope    PMH: GERD, osteoarthritis, hypertension    Meds: See list    Surg hx: Prior wrist surgery for a prominent ulnar styloid    FH: Noncontributory, reviewed    SH: Lives with .  Current smoker.  Denies EtOH and illicits.          Physical Examination:  Constitutional: Well developed, awake/alert/oriented x3, no distress, alert and cooperative  Eyes: PERRL, EOMI, clear sclera  ENMT: mucous membranes moist, no apparent injury, no lesions seen  Head/Neck: Neck supple, no apparent injury, thyroid without mass or tenderness, No JVD, trachea  midline  Respiratory/Thorax: Patent airways, CTAB, normal breath sounds with good chest expansion, thorax symmetric  Cardiovascular: Regular, rate and rhythm, no appreciable murmurs, 2+ equal pulses of the extremities, normal S 1and S 2  Gastrointestinal: Nondistended, soft, non-tender, no rebound tenderness or guarding, no masses palpable, no organomegaly, +BS  Musculoskeletal: Left lower extremity shortened externally rotated, tenderness to gentle palpation around the hip  Extremities: normal extremities, no cyanosis edema, contusions, no clubbing  Neurological: alert and oriented x3, intact senses, motor, normal strength  Psychological: Appropriate mood and behavior  Skin: Warm and dry, no lesions, no rashes    Imaging:  Left intertrochanteric fracture    Assessment: Patient with a left intertrochanteric fracture    Plan:   We reviewed the patient's injury.  We discussed medical risk factors related to hip fractures including DVT, PE, significant cardiac and pulmonary events, including pneumonia, stroke, cardiac ischemia, prolonged ventilation and the possibility of mortality.     We discussed the importance of early mobilization and role of surgery for both functional improvement as well as pain management.  We discussed that despite the risk of medical complications the risk is even higher with non-surgical management and the  patient is limited to bed rest (risk of DVT, pressure sores, pneumonia).    Cephalomedullary nail placement was discussed including the risk of non-union and hardware failure/cutout.  We discussed we often mobilize the patient prior to complete union which can occasionally result in surgical failure.          Review Family/Social History and ROS:   Social History:    Smoking Status: never smoker  (1)            Allergies:  ·  penicillin : Rash  ·  aspirin : Other (Mild)  ·  NSAIDs : Other      Consult Status:  Consult Status    (select all that apply): initial  consult complete, will  "follow   Consult Order ID: [id number]       Electronic Signatures:  Tyron Quispe)  (Signed 29-Sep-2023 09:46)   Authored: Service, History of Present Illness, Review  Family/Social History and ROS, Allergies, Assessment/Recommendations, Note Completion      Last Updated: 29-Sep-2023 09:46 by Tyron Quispe)    References:  1.  Data Referenced From \"Patient Profile - Adult v2\" 29-Sep-2023 04:23   "

## 2023-10-13 ENCOUNTER — TELEPHONE (OUTPATIENT)
Dept: PRIMARY CARE | Facility: CLINIC | Age: 68
End: 2023-10-13
Payer: MEDICARE

## 2023-10-13 DIAGNOSIS — S72.009S CLOSED FRACTURE OF HIP, UNSPECIFIED LATERALITY, SEQUELA: Primary | ICD-10-CM

## 2023-10-13 RX ORDER — OXYCODONE HYDROCHLORIDE 5 MG/1
5 TABLET ORAL 2 TIMES DAILY PRN
Qty: 14 TABLET | Refills: 0 | Status: SHIPPED | OUTPATIENT
Start: 2023-10-13 | End: 2023-10-20 | Stop reason: SDUPTHER

## 2023-10-13 NOTE — TELEPHONE ENCOUNTER
Patient just was released from Nursing home [facility] - bro left hip/ patient is requesting pain medication  to get her through the weekend pharmacy: [facility]/PH: [phone]

## 2023-10-13 NOTE — TELEPHONE ENCOUNTER
I sent a prescription for oxycodone 5 mg twice daily as needed, #14 and no refills.  I left a voicemail the patient's answering machine to let her know the prescription was sent to her pharmacy.  Dr. Kamara

## 2023-10-17 ENCOUNTER — APPOINTMENT (OUTPATIENT)
Dept: ORTHOPEDIC SURGERY | Facility: CLINIC | Age: 68
End: 2023-10-17
Payer: MEDICARE

## 2023-10-20 ENCOUNTER — TELEPHONE (OUTPATIENT)
Dept: PRIMARY CARE | Facility: CLINIC | Age: 68
End: 2023-10-20
Payer: MEDICARE

## 2023-10-20 DIAGNOSIS — S72.009S CLOSED FRACTURE OF HIP, UNSPECIFIED LATERALITY, SEQUELA: ICD-10-CM

## 2023-10-20 RX ORDER — OXYCODONE HYDROCHLORIDE 5 MG/1
5 TABLET ORAL 2 TIMES DAILY PRN
Qty: 14 TABLET | Refills: 0 | Status: SHIPPED | OUTPATIENT
Start: 2023-10-20 | End: 2023-10-27

## 2023-10-20 NOTE — TELEPHONE ENCOUNTER
Patient called in to state that she needs a refll on Oxycodone. Patient's appt is not until 10/26. One refill is requested. Pharmacy: [facility]/PH: [phone]

## 2023-10-26 ENCOUNTER — APPOINTMENT (OUTPATIENT)
Dept: RHEUMATOLOGY | Facility: CLINIC | Age: 68
End: 2023-10-26
Payer: MEDICARE

## 2023-11-01 ENCOUNTER — ANCILLARY PROCEDURE (OUTPATIENT)
Dept: RADIOLOGY | Facility: CLINIC | Age: 68
End: 2023-11-01
Payer: MEDICARE

## 2023-11-01 ENCOUNTER — OFFICE VISIT (OUTPATIENT)
Dept: ORTHOPEDIC SURGERY | Facility: CLINIC | Age: 68
End: 2023-11-01
Payer: MEDICARE

## 2023-11-01 DIAGNOSIS — M25.552 HIP PAIN, ACUTE, LEFT: Primary | ICD-10-CM

## 2023-11-01 DIAGNOSIS — M25.552 HIP PAIN, ACUTE, LEFT: ICD-10-CM

## 2023-11-01 PROCEDURE — 3074F SYST BP LT 130 MM HG: CPT | Performed by: ORTHOPAEDIC SURGERY

## 2023-11-01 PROCEDURE — 1159F MED LIST DOCD IN RCRD: CPT | Performed by: ORTHOPAEDIC SURGERY

## 2023-11-01 PROCEDURE — 73502 X-RAY EXAM HIP UNI 2-3 VIEWS: CPT | Mod: LEFT SIDE | Performed by: RADIOLOGY

## 2023-11-01 PROCEDURE — 1125F AMNT PAIN NOTED PAIN PRSNT: CPT | Performed by: ORTHOPAEDIC SURGERY

## 2023-11-01 PROCEDURE — 99024 POSTOP FOLLOW-UP VISIT: CPT | Performed by: ORTHOPAEDIC SURGERY

## 2023-11-01 PROCEDURE — 3078F DIAST BP <80 MM HG: CPT | Performed by: ORTHOPAEDIC SURGERY

## 2023-11-01 PROCEDURE — 73502 X-RAY EXAM HIP UNI 2-3 VIEWS: CPT | Mod: LT

## 2023-11-01 PROCEDURE — 1111F DSCHRG MED/CURRENT MED MERGE: CPT | Performed by: ORTHOPAEDIC SURGERY

## 2023-11-01 RX ORDER — OXYCODONE AND ACETAMINOPHEN 5; 325 MG/1; MG/1
1 TABLET ORAL EVERY 6 HOURS PRN
Qty: 28 TABLET | Refills: 0 | Status: SHIPPED | OUTPATIENT
Start: 2023-11-01 | End: 2023-11-03 | Stop reason: ENTERED-IN-ERROR

## 2023-11-01 NOTE — PROGRESS NOTES
History of Present Illness:  Patient returns today endorsing mild pain.  Denies any numbness or tingling.  No calf pain, No chest pain or shortness of breath.    Physical Examination:  Mild swelling thigh  Healthy incisions, no erythema or drainage  Tolerates ROM and gentle log roll of hip without issues  + Plantar/dorsiflexion  Negative Low test    Imaging:  Appropriate position, no evidence of failure or cutout    Assessment:  Patient status post left hip cephalomedullary nail doing well    Plan:  Discussed analgesics, encouraging non-opioid modalities.  Encouraged ice, rest as need  Discussed weight bearing, DVT prophylaxis, and rehab  Follow-up:  1 month    I have personally reviewed the OARRS report for this patient. This report is scanned into  the electronic medical record. I have considered the risks of abuse, dependence, addiction, and diversion. They currently report a pain of 8.     Isak Crisostomo PA-C     In a face to face encounter, I evaluated the patient and performed a physical examination, discussed pertinent diagnostic studies if indicated and discussed diagnosis and management strategies with both the patient and physician assistant / nurse practitioner.  I reviewed the PA/NP's note and agree with the documented findings and plan of care.    I have personally reviewed the OARRS report for this patient. This report is scanned into  the electronic medical record. I have considered the risks of abuse, dependence, addiction, and diversion. They currently report a pain of 8.      Tyron Quispe MD

## 2023-11-03 DIAGNOSIS — M25.552 HIP PAIN, ACUTE, LEFT: ICD-10-CM

## 2023-11-03 RX ORDER — OXYCODONE AND ACETAMINOPHEN 5; 325 MG/1; MG/1
1 TABLET ORAL EVERY 6 HOURS PRN
Qty: 28 TABLET | Refills: 0 | Status: SHIPPED | OUTPATIENT
Start: 2023-11-03 | End: 2023-11-10

## 2023-11-07 ENCOUNTER — TELEPHONE (OUTPATIENT)
Dept: PRIMARY CARE | Facility: CLINIC | Age: 68
End: 2023-11-07
Payer: MEDICARE

## 2023-11-07 DIAGNOSIS — R35.0 URINARY FREQUENCY: Primary | ICD-10-CM

## 2023-11-07 NOTE — TELEPHONE ENCOUNTER
Discussed.  She states that she took an antibiotic for a week which she completed last week.  Current symptoms are frequency, no burning with urination.  She is wearing a depends because she does not trust herself.  She is recovering from a hip fracture.  I told her she needs to do clean-catch urine with culture.  She is going to have her  come by the office to  a clean-catch urine container with a wipe, and then she will return the specimen to the lab.  Dr. Kamara

## 2023-11-08 ENCOUNTER — INFUSION (OUTPATIENT)
Dept: INFUSION THERAPY | Facility: CLINIC | Age: 68
End: 2023-11-08
Payer: MEDICARE

## 2023-11-08 VITALS
TEMPERATURE: 96.2 F | BODY MASS INDEX: 18.37 KG/M2 | SYSTOLIC BLOOD PRESSURE: 116 MMHG | WEIGHT: 106.92 LBS | OXYGEN SATURATION: 100 % | HEART RATE: 83 BPM | RESPIRATION RATE: 18 BRPM | DIASTOLIC BLOOD PRESSURE: 50 MMHG

## 2023-11-08 DIAGNOSIS — M05.762 RHEUMATOID ARTHRITIS INVOLVING BOTH KNEES WITH POSITIVE RHEUMATOID FACTOR (MULTI): ICD-10-CM

## 2023-11-08 DIAGNOSIS — M05.761 RHEUMATOID ARTHRITIS INVOLVING BOTH KNEES WITH POSITIVE RHEUMATOID FACTOR (MULTI): ICD-10-CM

## 2023-11-08 PROCEDURE — 96413 CHEMO IV INFUSION 1 HR: CPT | Performed by: NURSE PRACTITIONER

## 2023-11-08 RX ORDER — FAMOTIDINE 10 MG/ML
20 INJECTION INTRAVENOUS ONCE AS NEEDED
Status: CANCELLED | OUTPATIENT
Start: 2023-12-20

## 2023-11-08 RX ORDER — DIPHENHYDRAMINE HYDROCHLORIDE 50 MG/ML
50 INJECTION INTRAMUSCULAR; INTRAVENOUS AS NEEDED
Status: CANCELLED | OUTPATIENT
Start: 2023-12-20

## 2023-11-08 RX ORDER — ALBUTEROL SULFATE 0.83 MG/ML
3 SOLUTION RESPIRATORY (INHALATION) AS NEEDED
Status: CANCELLED | OUTPATIENT
Start: 2023-12-20

## 2023-11-08 RX ORDER — EPINEPHRINE 0.3 MG/.3ML
0.3 INJECTION SUBCUTANEOUS EVERY 5 MIN PRN
Status: CANCELLED | OUTPATIENT
Start: 2023-12-20

## 2023-11-08 ASSESSMENT — ENCOUNTER SYMPTOMS
CONSTITUTIONAL NEGATIVE: 1
ARTHRALGIAS: 1

## 2023-11-08 ASSESSMENT — PAIN SCALES - GENERAL: PAINLEVEL: 7

## 2023-11-08 NOTE — PROGRESS NOTES
[facility]   Infusion Clinic Note   Date: 2023   Name: Tatiana Hagan  : 1955   MRN: [id number]         Reason for Visit: OP Infusion (PATIENT HERE FOR REMICADE 400 MG INFUSION Q 6 WEEKS)      Accompanied by:Self   Visit Type:: Infusion   Diagnosis: Rheumatoid arthritis involving both knees with positive rheumatoid factor (CMS/[facility])    Allergies:   Allergies as of 2023 - Reviewed 2023   Allergen Reaction Noted    Nsaids (non-steroidal anti-inflammatory drug) Other 2023    Aspirin Other 2023    Penicillins Rash 2023      Current Meds has a current medication list which includes the following prescription(s): acetaminophen, cyclobenzaprine, docusate sodium, gabapentin, hydrochlorothiazide, infliximab, lisinopril, omeprazole, and oxycodone-acetaminophen.        Vitals:  Vitals:    23 1258   BP: 116/50   Pulse: 83   Resp: 18   Temp: 35.7 °C (96.2 °F)   SpO2: 100%      Infusion Pre-procedure Checklist   Allergies reviewed: yes   Medications reviewed: yes   Contraindications to treatment:No   Previous reaction to current treatment:No   Current Health Issues: None   Pain: 7 [7]' Hip [18]   Is the pain different from normal: No   Is the pain tolerable: yes   Is your Doctor aware: yes   Contraindications based on patient history: No   Provider notified: Not applicable   Labs: None  Labs reviewed   Fall Risk Screening: Anand Fall Risk  History of Falling, Immediate or Within 3 Months: Yes  Secondary Diagnosis: No  Ambulatory Aid: Crutches/cane/walker  Intravenous Therapy/Heparin Lock: No  Gait/Transferring: Impaired   Mental Status: Oriented to own ability  Anand Fall Risk Score: 60    Review of Systems   Constitutional: Negative.    Musculoskeletal:  Positive for arthralgias.        PATIENT WITH LEFT HIP SURGERY IN SEPTEMBER      Negative for complaint: [x] all other systems have been reviewed and are negative for complaint   Infusion Readiness:    Assessment Concerns Related to Infusion: No  Provider notified: n/a  Assess patient for the concerns below. Document provider notification as appropriate:  - Does not meet criteria to treat N/A  - Has an active or recent infection with/without current antibiotic use No  - Has recent/planned dental work No  - Has recent/planned surgeries No  - Has recently received or plans to receive vaccinations No  - Has treatment related toxicities No  - Is pregnant (unless noted otherwise) N/A    Initiated By: Bethanie Lazaro RN   Time: 2:50 PM     We administered inFLIXimab (Remicade) 400 mg in sodium chloride 0.9% 250 mL IV.

## 2023-11-08 NOTE — PATIENT INSTRUCTIONS
Today you received: REMICADE 400 MG INFUSION Q 6 WEEKS    For:   1. Rheumatoid arthritis involving both knees with positive rheumatoid factor (CMS/[facility])          Please read the  Medication Guide that was given to you and reviewed during todays visit.     (Tell all doctors including dentists that you are taking this medication)     Go to the emergency room or call 911 if:  -You have signs of allergic reaction:   o         Rash, hives, itching.   o         Swollen, blistered, peeling skin.   o         Swelling of face, lips, mouth, tongue or throat.   o         Tightness of chest, trouble breathing, swallowing or talking      Call your doctor:     - If IV / injection site gets red, warm, swollen, itchy or leaks fluid or pus.     (Leave dressing on your IV site for at least 2 hours and keep area clean and dry  - If you get sick or have symptoms of infection or are not feeling well for any reason.    (Wash your hands often, stay away from people who are sick)  - If you have side effects from your medication that do not go away or are bothersome.     (Refer to the teaching your nurse gave you for side effects to call your doctor about)     Common side effects may include:  stuffy nose, headache, feeling tired, muscle aches, upset stomach  - Before receiving any vaccines, Call the [facility] at   if:  - You get sick, are on antibiotics, have had a recent vaccine, have surgery or dental work and your doctor wants your visit rescheduled.  - You need to cancel and reschedule your visit for any reason. Call at least 2 days before your visit if you need to cancel.   - Your insurance changes before your next visit.    (We will need to get approval from your new insurance. This can take up to two weeks.)     The [facility] is opened Monday thru Friday. We are closed on weekends and holidays.     Voice mail will take your call if the center is closed. If you leave a message please allow 24 hours  for a call back during weekdays. If you leave a message on a weekend/holiday, we will call you back the next business day.

## 2023-11-09 ENCOUNTER — LAB (OUTPATIENT)
Dept: LAB | Facility: LAB | Age: 68
End: 2023-11-09
Payer: MEDICARE

## 2023-11-09 DIAGNOSIS — R35.0 URINARY FREQUENCY: ICD-10-CM

## 2023-11-09 LAB
APPEARANCE UR: ABNORMAL
BACTERIA #/AREA URNS AUTO: ABNORMAL /HPF
BILIRUB UR STRIP.AUTO-MCNC: NEGATIVE MG/DL
COLOR UR: YELLOW
GLUCOSE UR STRIP.AUTO-MCNC: NEGATIVE MG/DL
KETONES UR STRIP.AUTO-MCNC: NEGATIVE MG/DL
LEUKOCYTE ESTERASE UR QL STRIP.AUTO: ABNORMAL
NITRITE UR QL STRIP.AUTO: NEGATIVE
PH UR STRIP.AUTO: 6 [PH]
PROT UR STRIP.AUTO-MCNC: ABNORMAL MG/DL
RBC # UR STRIP.AUTO: ABNORMAL /UL
RBC #/AREA URNS AUTO: ABNORMAL /HPF
SP GR UR STRIP.AUTO: 1.01
UROBILINOGEN UR STRIP.AUTO-MCNC: <2 MG/DL
WBC #/AREA URNS AUTO: >50 /HPF
WBC CLUMPS #/AREA URNS AUTO: ABNORMAL /HPF

## 2023-11-09 PROCEDURE — 87086 URINE CULTURE/COLONY COUNT: CPT

## 2023-11-09 PROCEDURE — 81001 URINALYSIS AUTO W/SCOPE: CPT

## 2023-11-10 LAB — BACTERIA UR CULT: NORMAL

## 2023-11-12 DIAGNOSIS — B37.31 VAGINAL CANDIDIASIS: Primary | ICD-10-CM

## 2023-11-12 RX ORDER — FLUCONAZOLE 150 MG/1
150 TABLET ORAL ONCE
Qty: 1 TABLET | Refills: 0 | Status: SHIPPED | OUTPATIENT
Start: 2023-11-12 | End: 2023-11-12

## 2023-11-14 ENCOUNTER — TELEPHONE (OUTPATIENT)
Dept: PRIMARY CARE | Facility: CLINIC | Age: 68
End: 2023-11-14
Payer: MEDICARE

## 2023-11-16 NOTE — TELEPHONE ENCOUNTER
Patient home health certification and plan of care form will be faxed over pls advise of when the form is completed, as this is needed to complete and follow up with care for patient. [facility]/: [phone]/fax: [phone]

## 2023-11-27 ENCOUNTER — TELEPHONE (OUTPATIENT)
Dept: ORTHOPEDIC SURGERY | Facility: CLINIC | Age: 68
End: 2023-11-27
Payer: MEDICARE

## 2023-11-27 ENCOUNTER — TELEPHONE (OUTPATIENT)
Dept: PRIMARY CARE | Facility: CLINIC | Age: 68
End: 2023-11-27
Payer: MEDICARE

## 2023-11-27 NOTE — TELEPHONE ENCOUNTER
Patient states she needs the fluid in her knee drained asap. States she has been in a chair for the last 3 days and can hardly walk.     Would like to know if you could fit her into your schedule soon.     Please advise

## 2023-11-29 ENCOUNTER — OFFICE VISIT (OUTPATIENT)
Dept: RHEUMATOLOGY | Facility: CLINIC | Age: 68
End: 2023-11-29
Payer: MEDICARE

## 2023-11-29 VITALS
SYSTOLIC BLOOD PRESSURE: 118 MMHG | TEMPERATURE: 98.6 F | HEIGHT: 64 IN | BODY MASS INDEX: 17.42 KG/M2 | HEART RATE: 61 BPM | WEIGHT: 102 LBS | OXYGEN SATURATION: 97 % | DIASTOLIC BLOOD PRESSURE: 78 MMHG

## 2023-11-29 DIAGNOSIS — M19.90 ARTHRITIS: ICD-10-CM

## 2023-11-29 DIAGNOSIS — M05.79 RHEUMATOID ARTHRITIS INVOLVING MULTIPLE SITES WITH POSITIVE RHEUMATOID FACTOR (MULTI): Primary | ICD-10-CM

## 2023-11-29 DIAGNOSIS — M81.0 AGE RELATED OSTEOPOROSIS, UNSPECIFIED PATHOLOGICAL FRACTURE PRESENCE: ICD-10-CM

## 2023-11-29 DIAGNOSIS — L40.59 POLYARTICULAR PSORIATIC ARTHRITIS (MULTI): ICD-10-CM

## 2023-11-29 DIAGNOSIS — M89.9 OSTEOPATHIA: Primary | ICD-10-CM

## 2023-11-29 DIAGNOSIS — M11.20 PSEUDOGOUT: ICD-10-CM

## 2023-11-29 PROCEDURE — 3074F SYST BP LT 130 MM HG: CPT | Performed by: INTERNAL MEDICINE

## 2023-11-29 PROCEDURE — 87075 CULTR BACTERIA EXCEPT BLOOD: CPT

## 2023-11-29 PROCEDURE — 87205 SMEAR GRAM STAIN: CPT

## 2023-11-29 PROCEDURE — 87070 CULTURE OTHR SPECIMN AEROBIC: CPT

## 2023-11-29 PROCEDURE — 1125F AMNT PAIN NOTED PAIN PRSNT: CPT | Performed by: INTERNAL MEDICINE

## 2023-11-29 PROCEDURE — 3008F BODY MASS INDEX DOCD: CPT | Performed by: INTERNAL MEDICINE

## 2023-11-29 PROCEDURE — 3078F DIAST BP <80 MM HG: CPT | Performed by: INTERNAL MEDICINE

## 2023-11-29 PROCEDURE — 1160F RVW MEDS BY RX/DR IN RCRD: CPT | Performed by: INTERNAL MEDICINE

## 2023-11-29 PROCEDURE — 1159F MED LIST DOCD IN RCRD: CPT | Performed by: INTERNAL MEDICINE

## 2023-11-29 PROCEDURE — 20610 DRAIN/INJ JOINT/BURSA W/O US: CPT | Performed by: INTERNAL MEDICINE

## 2023-11-29 PROCEDURE — 99214 OFFICE O/P EST MOD 30 MIN: CPT | Performed by: INTERNAL MEDICINE

## 2023-11-29 RX ORDER — PREDNISONE 5 MG/1
5 TABLET ORAL DAILY
COMMUNITY
Start: 2023-11-07 | End: 2024-01-05 | Stop reason: SDUPTHER

## 2023-11-29 RX ORDER — TRIAMCINOLONE ACETONIDE 40 MG/ML
40 INJECTION, SUSPENSION INTRA-ARTICULAR; INTRAMUSCULAR
Status: COMPLETED | OUTPATIENT
Start: 2023-11-29 | End: 2023-11-29

## 2023-11-29 RX ORDER — LIDOCAINE HYDROCHLORIDE 10 MG/ML
4 INJECTION INFILTRATION; PERINEURAL
Status: COMPLETED | OUTPATIENT
Start: 2023-11-29 | End: 2023-11-29

## 2023-11-29 RX ADMIN — LIDOCAINE HYDROCHLORIDE 4 ML: 10 INJECTION INFILTRATION; PERINEURAL at 15:06

## 2023-11-29 RX ADMIN — TRIAMCINOLONE ACETONIDE 40 MG: 40 INJECTION, SUSPENSION INTRA-ARTICULAR; INTRAMUSCULAR at 15:06

## 2023-11-29 ASSESSMENT — ENCOUNTER SYMPTOMS
LOSS OF SENSATION IN FEET: 0
DEPRESSION: 0
OCCASIONAL FEELINGS OF UNSTEADINESS: 1

## 2023-11-29 ASSESSMENT — PATIENT HEALTH QUESTIONNAIRE - PHQ9
2. FEELING DOWN, DEPRESSED OR HOPELESS: NOT AT ALL
1. LITTLE INTEREST OR PLEASURE IN DOING THINGS: NOT AT ALL
SUM OF ALL RESPONSES TO PHQ9 QUESTIONS 1 AND 2: 0

## 2023-11-29 NOTE — PROGRESS NOTES
Subjective   Patient ID: Tatiana Hagan is a 68 y.o. female who presents for knee pain and swelling..    HPI 68 -year-old woman with history of overlap of rheumatoid arthritis and psoriatic neuritis , history of osteoporosis and CPPD arthropathy , who is here for follow-up visit.     The patient fell and fractured her left hip September 29, 2023.  She had surgery and was discharged October 2, 2023.  She was discharged to rehab center for 2 weeks.  She notes that she does not like the food and she thinks she lost weight in the rehab center.  She states that she has been eating better since discharge from rehab center.    Her weight is down 15 pounds since June 2023.  Current weight is 102 pounds with BMI 17.5.    Last Prolia injection was July 13, 2023.    The patient was added on today because of pain and swelling in her right knee, which started 5 days ago.  She has been trying to rest and keep it elevated, but the knee is still swollen and somewhat painful.  She denies fever or chills.    She remains on Remicade 6 mg/kg (400 mg total) IV every 6 weeks, prednisone 5 mg daily. She tried to taper her prednisone to 2.5 mg daily a few months ago but was unable to tolerate the taper.     She had Moderna COVID-19 vaccine March 3, 2021, April 3, 2021, and third vaccine October 20, 2021. She had booster 6/08/22. She had bivalent booster 10/06/22.  She had HD flu vaccine 9/22.     Right knee was injected with good relief 12/12/19 , 3/20, 12/11/20, and 11/18/21.      MRI of LS spine done Feb 23rd, 2021 showed bilateral sacral insufficiency fractures.     She did trip on her right foot December 27, 2020. She fell backwards on a carpeted surface in her bedroom. She suffered fracture of the right fifth finger proximal phalanx, which required surgery on December 30, 2020 by Dr. Lin.   She had injection right 1st [facility] 5/05/22 by Dr. Lin.     Her psoriasis is somewhat more active.    She did have a cardiac calcium score 3/20  that was 188. Dr. Mo prescribed atorvastatin 80 mg daily.     She continues to smoke 1 pack/day which she has done for 50 years. She is not currently interested in quitting smoking.     She has a history of erosive esophagitis and esophageal strictures. Her last EGD and dilation was 3/19 with Dr. Cm.  She had follow up colonoscopy scheduled with Dr. Patterson November 2022, but it needs to be rescheduled.     She resumed Reclast 5/18 (this was given early because of a decline in BMD- she had been off treatment 2 years and 5 months). She received reclast again 7/19.     She started Prolia 10/20.   Last Prolia injection was 12/22.. Next injection is due 6/23.     She had an EGD and colonoscopy with Dr. Renee 3/19. Biopsies at that time did not show evidence of Reyes's esophagus. She did have serrated polyps- was told to do 3 year f/u.     EKG 11/21: normal.     Labs June 2022: T spot negative, ESR 8, CBC normal except white blood cell count 11.9, CMP normal except sodium 135, cholesterol 183, H DL 74, LDL 90, triglycerides 96  Labs October 2022: BMP normal except sodium 131 and glucose 116  Labs 5/23: BMP normal except sodium 134.  Labs September 2023: BUN 50, creatinine 1.69, glucose 189  Labs October 2, 2023 : CBC normal except white blood cell count 14.3, hemoglobin 7.9, hematocrit 35.1, platelets 236, BMP normal except sodium 133 and glucose 56       DEXA August 2015 shows femoral neck T score -3.0, total hip T score -3.0, lumbar spine T score -3.1.  She received Reclast 10/13, 11/14, and 12/15.  DEXA 9/17: T score -3.2 femoral neck (decline of 4.8%), T score -3.0 total hip, T score -1.9 lumbar spine.   Received reclast 4/18 (because of loss of BMD 9/17), 7/19.  DEXA 10/19: T score -3.3 femoral neck (stable), T score -3.4 total hip (decline of 7.3%), T score -2.6 lumbar spine (decline of 8.5%)  DEXA December 2021: T score -2.8 left femoral neck (increased 13.2%), T score -3.2 left total hip (increased  "4.3%), T score -2.3 lumbar spine (increased 3.6%)     Health maintenance:   Mammograms have been refused.   Colonoscopy 5/18- needs 3 year follow-up because of serrated polyps.   EGD 3/18: 3 cm hiatal hernia, erosive gastritis, ring in distal 1/3 of esophagus (dilated).. Repeated /19.   Pneumovax 2005, 5/21   Prevnar 13 1/18   Moderna COVID-19 vaccine March 3, 2021, April 3, 2021. Received booster 10/20/21 and 6/22. BV booster was given 10/06/22.   flu shot 9/21/21   eye exam 1/16   QuantiFERON-TB Gold negative 8/18  T spot negative 5/21   Low dose chest CT (for lung cancer screening) 7/23-needs 1 year follow-up.     Medical problem list:   - RA (, )- previously failed MTX.   Took MTX from 8058-5198 (not certain why it was stopped).   Took Humira from 1378-2454.   Changed to remicade 2012 (because of worsening of psoriasis).      - psoriatic arthritis-    - Essential hypertension   - h/o CPPD arthropathy knee 1/09   - Cardiac calcium score 188 March 2020   - Osteoporosis- took Forteo [id number].    Started reclast 2010-had infusions October 2013, November 2014, December 2015, May 2018, July 2019.    Started Prolia 10/20 (due to loss of BMD and hip on DEXA October 2019.    - h/o bilateral sacral insufficiency fractures 2/21       ROS:  General: Denies fevers or chills.  CV: Denies chest pain or palpitations.  Denies leg edema.  Lungs: Denies coughing or shortness of breath.  Skin: Denies rashes or nodules.  MS: Denies joint pain or joint swelling.     Objective   Visit Vitals  /78 (BP Location: Right arm, Patient Position: Sitting, BP Cuff Size: Adult)   Pulse 61   Temp 37 °C (98.6 °F) (Temporal)   Ht 1.626 m (5' 4\")   Wt 46.3 kg (102 lb)   SpO2 97%   BMI 17.51 kg/m²   Smoking Status Every Day   BSA 1.45 m²        Physical Exam  HEENT: PERRL, EOMI  Neck: Supple, no nodes.  CV: RRR, no MGR.  Lungs: Clear, no rales or wheezes.  Abdomen: Soft, nontender. No hepatosplenomegaly.  Extremities:  No " cyanosis, clubbing, or edema.  MS: Swollen: 1 (right knee)         Tender: 1 (right knee)         Patient global: 10         Evaluator global: 8          CDAI: 19 (moderate disease activity)            Skin: No nodules or vasculitic lesions.    Joint Injection Large/Arthrocentesis: R knee on 11/29/2023 3:06 PM  Medications: 40 mg triamcinolone acetonide 40 mg/mL; 4 mL lidocaine 10 mg/mL (1 %)      Injection of right knee- After warning of risk of infection, the lateral aspect of knee was prepped in usual fashion with Betadine. The knee was entered using sterile technique, being careful to avoid psoriasis plaques. 10 ml of slightly cloudy yellow fluid was aspirated. The knee was injected using sterile technique with Kenalog 40 mg and 4 mL's 1% lidocaine.  Sterile bandage was applied.    Assessment/Plan   Problem List Items Addressed This Visit             ICD-10-CM    Osteoporosis M81.0    Polyarticular psoriatic arthritis (CMS/HCC) L40.59    Relevant Orders    Joint Injection Large/Arthrocentesis: R knee    Sterile Fluid Culture/Smear    Pseudogout M11.20    Relevant Orders    Joint Injection Large/Arthrocentesis: R knee    Sterile Fluid Culture/Smear    Rheumatoid arthritis (CMS/HCC) - Primary M06.9    Relevant Orders    Joint Injection Large/Arthrocentesis: R knee    Sterile Fluid Culture/Smear    Adult body mass index less than 19 Z68.1     Other Visit Diagnoses         Codes    Arthritis     M19.90    Relevant Orders    Body Fluid Cell Count With Differential    Crystal Identification and Pathologist Review Synovial Fluid           1. RA/ psoriatic arthritis- overall stable . She did not tolerate trying to reduce prednisone to 2.5 mg daily a few months ago , currently is back on 5 mg daily . Continue Remicade 6 mg/kg IV every 6 weeks.  Right knee was injected today with Kenalog.     2. Hypertension- at goal.      3. Osteoporosis- Took forteo 2008- 2010.   Received Reclast October 2013, November 2014, December  2015, then again May 2018 (because of loss of BMD) and 7/19. DEXA 10/19 shows loss of BMD in hip..   Started prolia 10/20. Last dose was 7/23.. Next dose is due 1/24.  J DEXA 12/21 shows improved BMD: T score -2.8 left femoral neck (improved 13.2%), T score -3.2 left total hip (improved 4.3%), T score -2.3 lumbar spine (improved 3.6%).   Next DEXA is due December 2023.        4. History of erosive gastritis- continue Nexium. She had a recent episode of food sticking 7/21-I recommend that she follow-up with Dr. Cm to consider repeat EGD in case she needs to be dilated again. She is also due for her 3-year follow-up colonoscopy because of serrated polyps in March 2018. Follow-up appointment is being scheduled with Dr. Patterson.     5. . Cardiac calcium score 188 March 2020- atorvastatin 80 mg daily added.     6. Bilateral sacral insufficiency fractures 2/21- has healed.     7. Lung cancer screening-low-dose chest CT done 6/23 needs 1 year follow-up, which has been ordered.     8. ? Cervical radiculopathy-tried prednisone taper 9/22. She has not tried PT but symptoms have improved.     9. Advanced Care planning-she has a living well. HPOA is  [place]. She is full code.     10. BMI 17-she thinks this is due to her stay in the rehab center and she did not like the food.  I have encouraged her to keep eating, that we need to watch her weight.    Plan:  Right knee was injected today with Kenalog.  Next Prolia injection is due January 13, 2024.  DEXA ordered to do after December 7, 2023.       Follow-up January 2024 as scheduled.

## 2023-12-01 NOTE — PROGRESS NOTES
"Physical Therapy    Physical Therapy Evaluation and Treatment      Patient Name: Tatiana Hagan  MRN: [id number]  Today's Date: 12/4/2023    Insurance: Medicare  Allowed visits: subject to $2230 cap    Subjective  HPI: Left short cephalomedullary nailing of proximal femur fracture 9/29/23. Patient sustained an injury to her hip when she fell. She tripped on a box in her garage. She does not recall how long she was hospitalized for but was discharged to a SNF for \"a week or two\". She had home PT upon discharge to home for \"only 2 weeks\". She continues to have soreness and notes \"my  won't let me go up and down the steps\". Chief complaint currently is \"it's freaking sore\". Biggest limitation is \"I just do it, I don't pay any attention\". She will not get down on the floor. She was able to go up her steps 2 weeks ago though notes \"putting that pressure on the hip still sets me back a bit\". She is unable to ascend steps reciprocally. Exacerbating factors include \"spending too much time on it\". She had much pain over Thanksgiving due to prolonged standing. Relieving factors include \"nothing\". She is taking Tylenol with some relief. She was 100% functional prior to injury. She does not use a SPC \"because I trip myself\". PMH is positive for cigarette smoking (1 pack per day for 50 years), osteoporosis, bilateral knee OA, HTN, GERD, RA, and psoriasis.   Referring physician: Tyron Quispe MD - F/U 12/13/23    Living environment: multi-story home but patient lives on first floor; there is basement with laundry. Lives with  and her brother and autistic son.   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     Patient-specific goal: \"to get back to the way I was, to be functional, quit limping around\".     Objective  Worst pain in the last 24 hours, 3/10.     Precautions: fall risk, cigarette smoking; bilateral knee OA/RA right > left.     Relevant imaging/diagnostic testing results: x-rays of left hip " 23 demonstrate intramedullary nail fixation changes of left femoral intertrochanteric fracture without hardware complication. Mild cortical offset between the fracture fragments. Bilateral hip joint spaces are maintained. Bilateral gluteal soft tissue calcifications which may represent injection granulomas.     Observation: significant quadriceps atrophy evident bilaterally; knees appear larger than thigh circumference. Bilateral genu valgum and pes planus.     Gait Assessment: ambulated into clinic with wide MAHAMED, slow austin, and need for external support evident.     AROM  Right hip flexion: 133 degrees    Left hip flexion: 118 degrees  Right hip extension: slightly limited   Left hip extension: slightly limited  Right hip abduction: 34 degrees    Left hip abduction: 24 degrees  Right hip ER seated: 27 degrees    Left hip ER seated: 13 degrees p!   Right hip IR seated: 31 degrees    Left hip IR seated: 27 degrees    MMT  Right hip ER seated: 4    Left hip ER seated: 3+  Right hip IR seated: 4    Left hip IR seated: 4-   Right quadriceps: 4+ P!    Left quadriceps: 5   Right iliopsoas: 4 P!    Left iliopsoas: 4-   Right gluteus medius: 3+    Left gluteus medius: 3+  Right hip adductors: 3    Left hip adductors: 3  Right gluteus manas: 3+    Left gluteus manas: 3  Right hamstrin    Left hamstrin    Flexibility  Iliopsoas right: TBA    Iliopsoas left: TBA  Hamstring right: WNL    Hamstring left: WNL  Quadriceps right: slightly limited    Quadriceps left: slightly limited    LEFS: 53%    Assessment  67 yo female with approximately 10 week history of present condition and presence of 9 personal factors and/or comorbidities that impact the plan of care including age of 68 and PMH of cigarette smoking, osteoporosis, bilateral knee OA, HTN, GERD, RA, and psoriasis presents post-operatively with left hip pain, decreased AROM, decreased strength, and difficulty with functional mobility  "effecting the following body structures and functions: left LE body region and musculoskeletal body system including the left hip. Activity limitations and participation restrictions include decreased tolerance to ambulation and stair negotiation. Tatiana will therefore benefit from PT management to establish a HEP and promote safe strength progression. The clinical presentation of this patient is evolving and their history and examination findings are consistent with a moderate complexity evaluation. Good potential.    Treatment provided today: Initial evaluation completed. Discussed objective findings and goals of skilled care. Advised use of a SPC to normalize gait pattern and decreased fall risk. Instructed patient in therapeutic exercise and HEP with handout outlining specific parameters provided (seated march x15 ea, ball squeeze 10\"x10, bridges 3-5\" 2x10, supine clamshells RTB 2x10). Agreed upon POC and answered all questions.    10660 - 22 minutes, 1 unit untimed  44840 - 23 minutes, 2 units    Plan  Recommending PT management 2x/week for 4 weeks, 8 visits. Radha or any PTA.     Goals  Independent with HEP to expedite progress and promote goal achievement.   Increase LEFS to > or equal to 70% functional for increased functional ability.  Increase AROM left hip flexion to 130 degrees, ER in sitting to > or equal to 20 degrees, and hip abduction to 30 degrees for ease with movement.  Increase strength gluteals, iliopsoas, hip adductors, and rotators to > or equal to 4/5 for improved stability and tolerance to prolonged standing activities.   Patient ambulate > or equal to 50 feet with LRAD with improved austin and even stance time for decreased fall risk.        "

## 2023-12-03 LAB
BACTERIA FLD CULT: NORMAL
GRAM STN SPEC: NORMAL
GRAM STN SPEC: NORMAL

## 2023-12-04 ENCOUNTER — EVALUATION (OUTPATIENT)
Dept: PHYSICAL THERAPY | Facility: CLINIC | Age: 68
End: 2023-12-04
Payer: MEDICARE

## 2023-12-04 DIAGNOSIS — M62.81 GENERALIZED MUSCLE WEAKNESS: Primary | ICD-10-CM

## 2023-12-04 DIAGNOSIS — M25.552 HIP PAIN, ACUTE, LEFT: ICD-10-CM

## 2023-12-04 PROCEDURE — 97162 PT EVAL MOD COMPLEX 30 MIN: CPT | Mod: GP | Performed by: PHYSICAL THERAPIST

## 2023-12-04 PROCEDURE — 97110 THERAPEUTIC EXERCISES: CPT | Mod: GP | Performed by: PHYSICAL THERAPIST

## 2023-12-04 ASSESSMENT — PATIENT HEALTH QUESTIONNAIRE - PHQ9
1. LITTLE INTEREST OR PLEASURE IN DOING THINGS: NOT AT ALL
2. FEELING DOWN, DEPRESSED OR HOPELESS: NOT AT ALL
SUM OF ALL RESPONSES TO PHQ9 QUESTIONS 1 AND 2: 0

## 2023-12-04 ASSESSMENT — ENCOUNTER SYMPTOMS
LOSS OF SENSATION IN FEET: 0
DEPRESSION: 0
OCCASIONAL FEELINGS OF UNSTEADINESS: 0

## 2023-12-04 ASSESSMENT — PAIN DESCRIPTION - DESCRIPTORS: DESCRIPTORS: ACHING

## 2023-12-04 ASSESSMENT — PAIN SCALES - GENERAL: PAINLEVEL_OUTOF10: 4

## 2023-12-11 ENCOUNTER — ANCILLARY PROCEDURE (OUTPATIENT)
Dept: RADIOLOGY | Facility: CLINIC | Age: 68
End: 2023-12-11
Payer: MEDICARE

## 2023-12-11 DIAGNOSIS — M81.0 AGE RELATED OSTEOPOROSIS, UNSPECIFIED PATHOLOGICAL FRACTURE PRESENCE: ICD-10-CM

## 2023-12-11 PROCEDURE — 77080 DXA BONE DENSITY AXIAL: CPT

## 2023-12-11 PROCEDURE — 77080 DXA BONE DENSITY AXIAL: CPT | Performed by: STUDENT IN AN ORGANIZED HEALTH CARE EDUCATION/TRAINING PROGRAM

## 2023-12-11 NOTE — PROGRESS NOTES
"Physical Therapy Treatment    Patient Name: Tatiana Hagan  MRN: [id number]  Today's Date: 12/13/2023  Time Calculation  Start Time: 0247  Stop Time: 0324  Time Calculation (min): 37 min    Current Problem  1. Hip pain, acute, left  Follow Up In Physical Therapy      2. Generalized muscle weakness            Insurance:   Medicare  Allowed visits: subject to $2230 cap  Visit#2  Precautions   fall risk, cigarette smoking; bilateral knee OA/RA right > left     Subjective   Subjective:   Pt reports her pain is a little bit, but not a lot. Nothing she can deal with  Pain   3/10    Objective   Treatments:  MAYRA 3'  Hamstring stretch 30\"x3  seated march x15 ea   ball squeeze 5\"x15   bridges 3-5\" 2x10  Supine quad sets 5\"x10   supine clamshells RTB 2x10   SAQ 3\"x15  TKE w/ ball 5\"x15  HR/TR 15x ea  Standing hip abd, ext, flex B 10x ea  Sidestepping------  OP EDUCATION:   Keep reps low, to see if knee pain increases      Assessment:   Pt able to do hip abd/add without increased knee pain. Pt had some difficulty with hip elevation during bridges. Trialed SLR, but pt had pain in the buttocks, and groin, so terminated. Thus added SAQ and quad sets for more quad activation. Pt started to feel increased knee pain following HR. Tried MAYRA, which caused pt some knee clicking which was a little painful    Plan:   Cont to strengthen B LE without irritating the B knees              "

## 2023-12-13 ENCOUNTER — OFFICE VISIT (OUTPATIENT)
Dept: ORTHOPEDIC SURGERY | Facility: CLINIC | Age: 68
End: 2023-12-13
Payer: MEDICARE

## 2023-12-13 ENCOUNTER — ANCILLARY PROCEDURE (OUTPATIENT)
Dept: RADIOLOGY | Facility: CLINIC | Age: 68
End: 2023-12-13
Payer: MEDICARE

## 2023-12-13 ENCOUNTER — TREATMENT (OUTPATIENT)
Dept: PHYSICAL THERAPY | Facility: CLINIC | Age: 68
End: 2023-12-13
Payer: MEDICARE

## 2023-12-13 DIAGNOSIS — M25.552 HIP PAIN, ACUTE, LEFT: Primary | ICD-10-CM

## 2023-12-13 DIAGNOSIS — M25.552 HIP PAIN, ACUTE, LEFT: ICD-10-CM

## 2023-12-13 DIAGNOSIS — M62.81 GENERALIZED MUSCLE WEAKNESS: ICD-10-CM

## 2023-12-13 PROCEDURE — 73502 X-RAY EXAM HIP UNI 2-3 VIEWS: CPT | Mod: LEFT SIDE | Performed by: ORTHOPAEDIC SURGERY

## 2023-12-13 PROCEDURE — 1159F MED LIST DOCD IN RCRD: CPT | Performed by: ORTHOPAEDIC SURGERY

## 2023-12-13 PROCEDURE — 99024 POSTOP FOLLOW-UP VISIT: CPT | Performed by: ORTHOPAEDIC SURGERY

## 2023-12-13 PROCEDURE — 1160F RVW MEDS BY RX/DR IN RCRD: CPT | Performed by: ORTHOPAEDIC SURGERY

## 2023-12-13 PROCEDURE — 97110 THERAPEUTIC EXERCISES: CPT | Mod: GP,CQ

## 2023-12-13 PROCEDURE — 3008F BODY MASS INDEX DOCD: CPT | Performed by: ORTHOPAEDIC SURGERY

## 2023-12-13 PROCEDURE — 1125F AMNT PAIN NOTED PAIN PRSNT: CPT | Performed by: ORTHOPAEDIC SURGERY

## 2023-12-13 PROCEDURE — 73502 X-RAY EXAM HIP UNI 2-3 VIEWS: CPT | Mod: LT

## 2023-12-13 NOTE — PROGRESS NOTES
History of Present Illness:  Patient returns today endorsing mild pain.  Denies any numbness or tingling.    Physical Examination:  Well healed incisions, no erythema or drainage  Tolerates ROM and gentle log roll of hip without issues  + Plantar/dorsiflexion  Negative Low test    Imaging:  Appropriate position, no evidence of failure or cutout    Assessment:  Patient status post left hip cephalomedullary nail doing well    Plan:  Discussed analgesics, encouraging non-opioid modalities.  Encouraged ice, rest as need  Discussed weight bearing, DVT prophylaxis, and rehab  Follow-up:  Not needed at this time

## 2023-12-20 ENCOUNTER — INFUSION (OUTPATIENT)
Dept: INFUSION THERAPY | Facility: CLINIC | Age: 68
End: 2023-12-20
Payer: MEDICARE

## 2023-12-20 VITALS
BODY MASS INDEX: 17.69 KG/M2 | DIASTOLIC BLOOD PRESSURE: 68 MMHG | OXYGEN SATURATION: 99 % | SYSTOLIC BLOOD PRESSURE: 104 MMHG | HEART RATE: 90 BPM | WEIGHT: 103.06 LBS | RESPIRATION RATE: 16 BRPM

## 2023-12-20 DIAGNOSIS — M05.762 RHEUMATOID ARTHRITIS INVOLVING BOTH KNEES WITH POSITIVE RHEUMATOID FACTOR (MULTI): ICD-10-CM

## 2023-12-20 DIAGNOSIS — M05.761 RHEUMATOID ARTHRITIS INVOLVING BOTH KNEES WITH POSITIVE RHEUMATOID FACTOR (MULTI): ICD-10-CM

## 2023-12-20 PROCEDURE — 96413 CHEMO IV INFUSION 1 HR: CPT | Performed by: REGISTERED NURSE

## 2023-12-20 RX ORDER — EPINEPHRINE 0.3 MG/.3ML
0.3 INJECTION SUBCUTANEOUS EVERY 5 MIN PRN
Status: CANCELLED | OUTPATIENT
Start: 2024-01-31

## 2023-12-20 RX ORDER — ALBUTEROL SULFATE 0.83 MG/ML
3 SOLUTION RESPIRATORY (INHALATION) AS NEEDED
Status: CANCELLED | OUTPATIENT
Start: 2024-01-31

## 2023-12-20 RX ORDER — DIPHENHYDRAMINE HYDROCHLORIDE 50 MG/ML
50 INJECTION INTRAMUSCULAR; INTRAVENOUS AS NEEDED
Status: CANCELLED | OUTPATIENT
Start: 2024-01-31

## 2023-12-20 RX ORDER — FAMOTIDINE 10 MG/ML
20 INJECTION INTRAVENOUS ONCE AS NEEDED
Status: CANCELLED | OUTPATIENT
Start: 2024-01-31

## 2023-12-20 ASSESSMENT — ENCOUNTER SYMPTOMS: FATIGUE: 1

## 2023-12-20 NOTE — PROGRESS NOTES
[facility]   infusion Clinic Note   Date: 2023   Name: Tatiana Hagan  : 1955   MRN: [id number]         Reason for Visit: OP Infusion (400 mg Remicade)         Visit Type: INFUSION      Ordered By: Asad Poe      Accompanied by:Self      Diagnosis: Rheumatoid arthritis involving both knees with positive rheumatoid factor (CMS/[facility])       Allergies:   Allergies as of 2023 - Reviewed 2023   Allergen Reaction Noted    Nsaids (non-steroidal anti-inflammatory drug) Other 2023    Aspirin Other 2023    Penicillins Rash 2023         Current Medications has a current medication list which includes the following prescription(s): acetaminophen, denosumab, gabapentin, hydrochlorothiazide, infliximab, lisinopril, omeprazole, and prednisone.       Vitals:  Vitals:    23 1358 23 1451   BP: 128/88 104/68   Pulse: 67 90   Resp: 16 16   SpO2: 98% 99%   Weight: 46.7 kg (103 lb 1 oz)              Infusion Pre-procedure Checklist:   - Allergies reviewed: yes   - Medications reviewed: no       - Previous reaction to current treatment: no      Assess patient for the concerns below. Document provider notification as appropriate.  - Active or recent infection with/without current antibiotic use: no  - Recent or planned invasive dental work: no  - Recent or planned surgeries: no  - Recently received or plans to receive vaccinations: no  - Has treatment related toxicities: no  - Is pregnant:  no      Pain: 0   - Is the pain different from normal: n/a   - Is the pain tolerable: n/a   - Is your Doctor aware:  n/a      Labs: N/A         Fall Risk Screening: Cheng Fall Risk  History of Falling, Immediate or Within 3 Months: No  Ambulatory Aid: Walks without aid/bedrest/nurse assist  Intravenous Therapy/Heparin Lock: Yes  Gait/Transferring: Normal/bedrest/immobile       Review Of Systems:  Review of Systems   Constitutional:  Positive for fatigue.    Musculoskeletal:  Positive for gait problem.   Neurological:  Positive for gait problem.        Lt hip s/p surgery   All other systems reviewed and are negative.        Infusion Readiness:   - Assessment Concerns Related to Infusion: No  - Provider notified: no      Document Below Only If Indicated:   New Patient Education:    N/A (returning patient for continuation of therapy. Ongoing education provided as needed.)        Treatment Conditions & Drug Specific Questions:    InFLIXimab  (AVSOLA, INFLECTRA, REMICADE, RENFLEXIS)    (Unless otherwise specified on patient specific therapy plan):     TREATMENT CONDITIONS:  Unless otherwise specified on patient specific therapy plan HOLD and notify Provider prior to proceeding with today's infusion if patient with:  o Positive T-Spot  o Reactive Hep B sAg    Lab Results   Component Value Date    TBSIN Negative 06/13/2022    QFG NEGATIVE 08/28/2018     Labs reviewed and patient meets treatment conditions? Yes    REMINDER:   WEIGHT BASED DRUG     Recommended Vitals/Observation:  Vitals:     Induction: Obtain vital signs every 30 minutes; at end of observation period and as needed.     Maintenance: Obtain vital signs at start and end of infusions  Observation:     Induction: Patient is monitored for 30 minutes post-infusion     Maintenance: No observation.        Weight Based Drug Calculations:    WEIGHT BASED DRUGS: NOT APPLICABLE / FLAT DOSE  400 mg Remicade         Initiated By: Bhavna Reid RN   Time: 3:12 PM     We administered inFLIXimab (Remicade) 400 mg in sodium chloride 0.9% 250 mL IV.

## 2023-12-20 NOTE — PATIENT INSTRUCTIONS
Today :Tatiana Hagan had no medications administered during this visit.     For:   1. Rheumatoid arthritis involving both knees with positive rheumatoid factor (CMS/[facility])         Your next appointment is due in:  6 weeks        Please read the  Medication Guide that was given to you and reviewed during todays visit.     (Tell all doctors including dentists that you are taking this medication)     Go to the emergency room or call 911 if:  -You have signs of allergic reaction:   -Rash, hives, itching.   -Swollen, blistered, peeling skin.   -Swelling of face, lips, mouth, tongue or throat.   -Tightness of chest, trouble breathing, swallowing or talking     Call your doctor:  - If IV / injection site gets red, warm, swollen, itchy or leaks fluid or pus.     (Leave dressing on your IV site for at least 2 hours and keep area clean and dry  - If you get sick or have symptoms of infection or are not feeling well for any reason.    (Wash your hands often, stay away from people who are sick)  - If you have side effects from your medication that do not go away or are bothersome.     (Refer to the teaching your nurse gave you for side effects to call your doctor about)    - Common side effects may include:  stuffy nose, headache, feeling tired, muscle aches, upset stomach  - Before receiving any vaccines     - Call the [facility] at   If:  - You get sick, are on antibiotics, have had a recent vaccine, have surgery or dental work and your doctor wants your visit rescheduled.  - You need to cancel and reschedule your visit for any reason. Call at least 2 days before your visit if you need to cancel.   - Your insurance changes before your next visit.    (We will need to get approval from your new insurance. This can take up to two weeks.)     The [facility] is opened Monday thru Friday. We are closed on weekends and holidays.   Voice mail will take your call if the center is closed. If you leave a  message please allow 24 hours for a call back during weekdays. If you leave a message on a weekend/holiday, we will call you back the next business day.

## 2023-12-21 ENCOUNTER — TELEPHONE (OUTPATIENT)
Dept: PRIMARY CARE | Facility: CLINIC | Age: 68
End: 2023-12-21
Payer: MEDICARE

## 2023-12-27 ENCOUNTER — DOCUMENTATION (OUTPATIENT)
Dept: INFUSION THERAPY | Facility: CLINIC | Age: 68
End: 2023-12-27
Payer: MEDICARE

## 2024-01-02 ENCOUNTER — TELEPHONE (OUTPATIENT)
Dept: PRIMARY CARE | Facility: CLINIC | Age: 69
End: 2024-01-02
Payer: MEDICARE

## 2024-01-02 DIAGNOSIS — N30.00 ACUTE CYSTITIS WITHOUT HEMATURIA: Primary | ICD-10-CM

## 2024-01-02 RX ORDER — NITROFURANTOIN 25; 75 MG/1; MG/1
100 CAPSULE ORAL 2 TIMES DAILY
Qty: 10 CAPSULE | Refills: 0 | Status: SHIPPED | OUTPATIENT
Start: 2024-01-02 | End: 2024-01-07

## 2024-01-03 ENCOUNTER — APPOINTMENT (OUTPATIENT)
Dept: PHYSICAL THERAPY | Facility: CLINIC | Age: 69
End: 2024-01-03
Payer: MEDICARE

## 2024-01-05 ENCOUNTER — LAB (OUTPATIENT)
Dept: LAB | Facility: LAB | Age: 69
End: 2024-01-05
Payer: MEDICARE

## 2024-01-05 ENCOUNTER — OFFICE VISIT (OUTPATIENT)
Dept: RHEUMATOLOGY | Facility: CLINIC | Age: 69
End: 2024-01-05
Payer: MEDICARE

## 2024-01-05 VITALS
HEART RATE: 91 BPM | WEIGHT: 104.2 LBS | HEIGHT: 64 IN | OXYGEN SATURATION: 99 % | BODY MASS INDEX: 17.79 KG/M2 | DIASTOLIC BLOOD PRESSURE: 72 MMHG | SYSTOLIC BLOOD PRESSURE: 112 MMHG | TEMPERATURE: 97.5 F

## 2024-01-05 DIAGNOSIS — M05.79 RHEUMATOID ARTHRITIS INVOLVING MULTIPLE SITES WITH POSITIVE RHEUMATOID FACTOR (MULTI): ICD-10-CM

## 2024-01-05 DIAGNOSIS — L40.9 PSORIASIS: ICD-10-CM

## 2024-01-05 DIAGNOSIS — M05.79 RHEUMATOID ARTHRITIS INVOLVING MULTIPLE SITES WITH POSITIVE RHEUMATOID FACTOR (MULTI): Primary | ICD-10-CM

## 2024-01-05 DIAGNOSIS — I10 PRIMARY HYPERTENSION: ICD-10-CM

## 2024-01-05 DIAGNOSIS — M81.0 AGE RELATED OSTEOPOROSIS, UNSPECIFIED PATHOLOGICAL FRACTURE PRESENCE: ICD-10-CM

## 2024-01-05 DIAGNOSIS — R35.0 URINARY FREQUENCY: ICD-10-CM

## 2024-01-05 DIAGNOSIS — L40.59 POLYARTICULAR PSORIATIC ARTHRITIS (MULTI): ICD-10-CM

## 2024-01-05 PROBLEM — R26.81 UNSTEADINESS ON FEET: Status: ACTIVE | Noted: 2023-10-02

## 2024-01-05 PROBLEM — R26.9 UNSPECIFIED ABNORMALITIES OF GAIT AND MOBILITY: Status: ACTIVE | Noted: 2023-10-02

## 2024-01-05 PROBLEM — R60.9 EDEMA, UNSPECIFIED: Status: ACTIVE | Noted: 2023-10-02

## 2024-01-05 PROBLEM — G89.18 OTHER ACUTE POSTPROCEDURAL PAIN: Status: ACTIVE | Noted: 2023-10-02

## 2024-01-05 PROBLEM — K59.00 CONSTIPATION, UNSPECIFIED: Status: ACTIVE | Noted: 2023-10-02

## 2024-01-05 PROBLEM — M11.20 OTHER CHONDROCALCINOSIS, UNSPECIFIED SITE: Status: ACTIVE | Noted: 2023-09-26

## 2024-01-05 PROBLEM — E87.1 HYPO-OSMOLALITY AND HYPONATREMIA: Status: ACTIVE | Noted: 2023-09-30

## 2024-01-05 PROBLEM — M62.81 MUSCLE WEAKNESS (GENERALIZED): Status: ACTIVE | Noted: 2023-10-02

## 2024-01-05 PROBLEM — R26.2 DIFFICULTY IN WALKING, NOT ELSEWHERE CLASSIFIED: Status: ACTIVE | Noted: 2023-10-02

## 2024-01-05 LAB
25(OH)D3 SERPL-MCNC: 22 NG/ML (ref 30–100)
ALBUMIN SERPL BCP-MCNC: 4.1 G/DL (ref 3.4–5)
ALP SERPL-CCNC: 66 U/L (ref 33–136)
ALT SERPL W P-5'-P-CCNC: 6 U/L (ref 7–45)
ANION GAP SERPL CALC-SCNC: 13 MMOL/L (ref 10–20)
AST SERPL W P-5'-P-CCNC: 12 U/L (ref 9–39)
BASOPHILS # BLD AUTO: 0.12 X10*3/UL (ref 0–0.1)
BASOPHILS NFR BLD AUTO: 0.7 %
BILIRUB SERPL-MCNC: 0.3 MG/DL (ref 0–1.2)
BUN SERPL-MCNC: 19 MG/DL (ref 6–23)
CALCIUM SERPL-MCNC: 9.4 MG/DL (ref 8.6–10.3)
CHLORIDE SERPL-SCNC: 96 MMOL/L (ref 98–107)
CO2 SERPL-SCNC: 29 MMOL/L (ref 21–32)
CREAT SERPL-MCNC: 1.02 MG/DL (ref 0.5–1.05)
CRP SERPL-MCNC: <0.1 MG/DL
EOSINOPHIL # BLD AUTO: 0.21 X10*3/UL (ref 0–0.7)
EOSINOPHIL NFR BLD AUTO: 1.3 %
ERYTHROCYTE [DISTWIDTH] IN BLOOD BY AUTOMATED COUNT: 15.9 % (ref 11.5–14.5)
GFR SERPL CREATININE-BSD FRML MDRD: 59 ML/MIN/1.73M*2
GLUCOSE SERPL-MCNC: 91 MG/DL (ref 74–99)
HCT VFR BLD AUTO: 43.9 % (ref 36–46)
HGB BLD-MCNC: 13.8 G/DL (ref 12–16)
IMM GRANULOCYTES # BLD AUTO: 0.12 X10*3/UL (ref 0–0.7)
IMM GRANULOCYTES NFR BLD AUTO: 0.7 % (ref 0–0.9)
LYMPHOCYTES # BLD AUTO: 1.71 X10*3/UL (ref 1.2–4.8)
LYMPHOCYTES NFR BLD AUTO: 10.3 %
MCH RBC QN AUTO: 26.3 PG (ref 26–34)
MCHC RBC AUTO-ENTMCNC: 31.4 G/DL (ref 32–36)
MCV RBC AUTO: 84 FL (ref 80–100)
MONOCYTES # BLD AUTO: 0.8 X10*3/UL (ref 0.1–1)
MONOCYTES NFR BLD AUTO: 4.8 %
NEUTROPHILS # BLD AUTO: 13.67 X10*3/UL (ref 1.2–7.7)
NEUTROPHILS NFR BLD AUTO: 82.2 %
NRBC BLD-RTO: 0 /100 WBCS (ref 0–0)
PLATELET # BLD AUTO: 345 X10*3/UL (ref 150–450)
POTASSIUM SERPL-SCNC: 4.5 MMOL/L (ref 3.5–5.3)
PROT SERPL-MCNC: 6.9 G/DL (ref 6.4–8.2)
RBC # BLD AUTO: 5.24 X10*6/UL (ref 4–5.2)
SODIUM SERPL-SCNC: 133 MMOL/L (ref 136–145)
WBC # BLD AUTO: 16.6 X10*3/UL (ref 4.4–11.3)

## 2024-01-05 PROCEDURE — 1160F RVW MEDS BY RX/DR IN RCRD: CPT | Performed by: INTERNAL MEDICINE

## 2024-01-05 PROCEDURE — 85025 COMPLETE CBC W/AUTO DIFF WBC: CPT

## 2024-01-05 PROCEDURE — 82306 VITAMIN D 25 HYDROXY: CPT

## 2024-01-05 PROCEDURE — 80053 COMPREHEN METABOLIC PANEL: CPT

## 2024-01-05 PROCEDURE — 96372 THER/PROPH/DIAG INJ SC/IM: CPT | Performed by: INTERNAL MEDICINE

## 2024-01-05 PROCEDURE — 81001 URINALYSIS AUTO W/SCOPE: CPT

## 2024-01-05 PROCEDURE — 36415 COLL VENOUS BLD VENIPUNCTURE: CPT

## 2024-01-05 PROCEDURE — 3074F SYST BP LT 130 MM HG: CPT | Performed by: INTERNAL MEDICINE

## 2024-01-05 PROCEDURE — 99214 OFFICE O/P EST MOD 30 MIN: CPT | Performed by: INTERNAL MEDICINE

## 2024-01-05 PROCEDURE — 3008F BODY MASS INDEX DOCD: CPT | Performed by: INTERNAL MEDICINE

## 2024-01-05 PROCEDURE — 87086 URINE CULTURE/COLONY COUNT: CPT

## 2024-01-05 PROCEDURE — 1125F AMNT PAIN NOTED PAIN PRSNT: CPT | Performed by: INTERNAL MEDICINE

## 2024-01-05 PROCEDURE — 86140 C-REACTIVE PROTEIN: CPT

## 2024-01-05 PROCEDURE — 3078F DIAST BP <80 MM HG: CPT | Performed by: INTERNAL MEDICINE

## 2024-01-05 PROCEDURE — 1159F MED LIST DOCD IN RCRD: CPT | Performed by: INTERNAL MEDICINE

## 2024-01-05 RX ORDER — PREDNISONE 5 MG/1
5 TABLET ORAL DAILY
Qty: 90 TABLET | Refills: 1 | Status: SHIPPED | OUTPATIENT
Start: 2024-01-05

## 2024-01-05 RX ORDER — BETAMETHASONE DIPROPIONATE 0.5 MG/G
1 OINTMENT TOPICAL DAILY PRN
COMMUNITY
End: 2024-01-05 | Stop reason: SDUPTHER

## 2024-01-05 RX ORDER — LISINOPRIL 5 MG/1
5 TABLET ORAL DAILY
Qty: 90 TABLET | Refills: 1 | Status: SHIPPED | OUTPATIENT
Start: 2024-01-05

## 2024-01-05 RX ORDER — BETAMETHASONE DIPROPIONATE 0.5 MG/G
1 OINTMENT TOPICAL DAILY PRN
Qty: 15 G | Refills: 2 | Status: SHIPPED | OUTPATIENT
Start: 2024-01-05

## 2024-01-05 RX ORDER — HYDROCHLOROTHIAZIDE 25 MG/1
25 TABLET ORAL DAILY
Qty: 90 TABLET | Refills: 1 | Status: SHIPPED | OUTPATIENT
Start: 2024-01-05

## 2024-01-05 ASSESSMENT — ENCOUNTER SYMPTOMS
DEPRESSION: 0
LOSS OF SENSATION IN FEET: 0
OCCASIONAL FEELINGS OF UNSTEADINESS: 0

## 2024-01-05 ASSESSMENT — PATIENT HEALTH QUESTIONNAIRE - PHQ9
SUM OF ALL RESPONSES TO PHQ9 QUESTIONS 1 AND 2: 0
2. FEELING DOWN, DEPRESSED OR HOPELESS: NOT AT ALL
1. LITTLE INTEREST OR PLEASURE IN DOING THINGS: NOT AT ALL

## 2024-01-05 NOTE — PROGRESS NOTES
Subjective   Patient ID: Tatiana Hagan is a 68 y.o. female who presents for Follow-up.    HPI 68 -year-old woman with history of overlap of rheumatoid arthritis and psoriatic neuritis , history of osteoporosis and CPPD arthropathy , who is here for follow-up visit.      The patient fell and fractured her left hip September 29, 2023.  She still has some pain in the hip.  She is ambulating without assistance.  She had surgery and was discharged October 2, 2023.  She was discharged to rehab center for 2 weeks.  She notes that she does not like the food and she thinks she lost weight in the rehab center.  She states that she has been eating better since discharge from rehab center.     Her weight is down 13 pounds since June 2023.  She has gained 2 pounds since her last visit.  Current weight is 104 pounds with BMI 17.89.    She complains of urinary frequency over the last 3 to 4 days without dysuria.  She started Macrobid 100 mg twice daily 3 days ago but is still having frequency and urgency.     Last Prolia injection was July 13, 2023.  Prolia injection was given January 5, 2024.     She had Kenalog injection of her right knee with good relief  11/29/23.    She remains on Remicade 6 mg/kg (400 mg total) IV every 6 weeks, prednisone 5 mg daily. She tried to taper her prednisone to 2.5 mg daily a few months ago but was unable to tolerate the taper.     Right knee was injected with good relief 12/12/19 , 3/20, 12/11/20, and 11/18/21.      MRI of LS spine done Feb 23rd, 2021 showed bilateral sacral insufficiency fractures.     Her psoriasis is stable.     She did have a cardiac calcium score 3/20 that was 188. Dr. Mo prescribed atorvastatin 80 mg daily.     She continues to smoke 1 pack/day which she has done for 50 years. She is not currently interested in quitting smoking.     She has a history of erosive esophagitis and esophageal strictures. Her last EGD and dilation was 3/19 with Dr. Cm.  She had follow up  colonoscopy scheduled with Dr. Patterson November 2022, but it needs to be rescheduled.     She resumed Reclast 5/18 (this was given early because of a decline in BMD- she had been off treatment 2 years and 5 months). She received reclast again 7/19.     She started Prolia 10/20.      She had an EGD and colonoscopy with Dr. Renee 3/19. Biopsies at that time did not show evidence of Reyes's esophagus. She did have serrated polyps- was told to do 3 year f/u.     EKG 11/21: normal.     Labs June 2022: T spot negative, ESR 8, CBC normal except white blood cell count 11.9, CMP normal except sodium 135, cholesterol 183, H DL 74, LDL 90, triglycerides 96  Labs October 2022: BMP normal except sodium 131 and glucose 116  Labs 5/23: BMP normal except sodium 134.  Labs September 2023: BUN 50, creatinine 1.69, glucose 189  Labs October 2, 2023 : CBC normal except white blood cell count 14.3, hemoglobin 7.9, hematocrit 35.1, platelets 236, BMP normal except sodium 133 and glucose 56        DEXA August 2015 shows femoral neck T score -3.0, total hip T score -3.0, lumbar spine T score -3.1.  She received Reclast 10/13, 11/14, and 12/15.  DEXA 9/17: T score -3.2 femoral neck (decline of 4.8%), T score -3.0 total hip, T score -1.9 lumbar spine.   Received reclast 4/18 (because of loss of BMD 9/17), 7/19.  DEXA 10/19: T score -3.3 femoral neck (stable), T score -3.4 total hip (decline of 7.3%), T score -2.6 lumbar spine (decline of 8.5%)  DEXA December 2021: T score -2.8 left femoral neck (increased 13.2%), T score -3.2 left total hip (increased 4.3%), T score -2.3 lumbar spine (increased 3.6%)     Health maintenance:   Mammograms have been refused.   Colonoscopy 5/18- needs 3 year follow-up because of serrated polyps.   EGD 3/18: 3 cm hiatal hernia, erosive gastritis, ring in distal 1/3 of esophagus (dilated).. Repeated /19.   Pneumovax 2005, 5/21   Prevnar 13 1/18   Moderna COVID-19 vaccine March 3, 2021, April 3, 2021. Received  "booster 10/20/21 and 6/22. BV booster was given 10/06/22.   flu shot 9/21/21   eye exam 1/16   QuantiFERON-TB Gold negative 8/18  T spot negative 5/21   Low dose chest CT (for lung cancer screening) 7/23-needs 1 year follow-up.     Medical problem list:   - RA (, )- previously failed MTX.   Took MTX from 7413-1305 (not certain why it was stopped).   Took Humira from 4456-1627.   Changed to remicade 2012 (because of worsening of psoriasis).      - psoriatic arthritis-    - Essential hypertension   - h/o CPPD arthropathy knee 1/09   - Cardiac calcium score 188 March 2020   - Osteoporosis- took Forteo [id number].    Started reclast 2010-had infusions October 2013, November 2014, December 2015, May 2018, July 2019.    Started Prolia 10/20 (due to loss of BMD and hip on DEXA October 2019.    - h/o bilateral sacral insufficiency fractures 2/21        ROS:  General: Denies fevers or chills.  CV: Denies chest pain or palpitations.  Denies leg edema.  Lungs: Denies coughing or shortness of breath.  Skin: Denies rashes or nodules.  MS: Denies joint pain n(except right hip) or joint swelling.            Objective   /72 (BP Location: Right arm, Patient Position: Sitting, BP Cuff Size: Small adult)   Pulse 91   Temp 36.4 °C (97.5 °F)   Ht 1.626 m (5' 4\")   Wt 47.3 kg (104 lb 3.2 oz)   SpO2 99%   BMI 17.89 kg/m²     Physical Exam  HEENT: PERRL, EOMI  Neck: Supple, no nodes.  CV: RRR, no MGR.  Lungs: Clear, no rales or wheezes.  Abdomen: Soft, nontender. No hepatosplenomegaly.  Extremities:  No cyanosis, clubbing, or edema.  MS: Swollen: 0         Tender: 0         Patient global: 6         Evaluator global: 5          CDAI: 11 (moderate disease activity)            Skin: Plaques consistent with psoriasis on both elbows.  Has plaques in the pretibial area of both legs.      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    Rheumatoid arthritis (CMS/HCC) - Primary M06.9    Relevant Medications    predniSONE (Deltasone) 5 mg tablet    Other Relevant Orders    Comprehensive Metabolic Panel    CBC and Auto Differential    C-Reactive Protein     Other Visit Diagnoses         Codes    Urinary frequency     R35.0    Relevant Orders    Urinalysis with Reflex Microscopic    Urine Culture    Primary hypertension     I10    Relevant Medications    hydroCHLOROthiazide (HYDRODiuril) 25 mg tablet    lisinopril 5 mg tablet    Psoriasis     L40.9    Relevant Medications    betamethasone dipropionate (Diprosone) 0.05 % ointment              1. RA/ psoriatic arthritis- overall stable . She did not tolerate trying to reduce prednisone to 2.5 mg daily a few months ago , currently is back on 5 mg daily . Continue Remicade 6 mg/kg IV every 6 weeks.  Right knee was injected 11/23 with Kenalog.     2. Hypertension- at goal.      3. Osteoporosis- Took forteo 2008- 2010.   Received Reclast October 2013, November 2014, December 2015, then again May 2018 (because of loss of BMD) and 7/19. DEXA 10/19 shows loss of BMD in hip..   Started prolia 10/20. Last dose was 7/23.. Prolia was given 1/05/24.  DEXA done December 2023 shows T-score -2.9 left femoral neck, T-score -3.4 left total hip, T-score -1.9 lumbar spine.  Bone density is stable in the hip, bone density has increased 5.4% in the lumbar spine.        4. History of erosive gastritis- continue Nexium. She had a recent episode of food sticking 7/21-I recommend that she follow-up with Dr. Cm to consider repeat EGD in case she needs to be dilated again. She is also due for her 3-year follow-up colonoscopy because of serrated polyps in March 2018. Follow-up appointment is being scheduled with Dr. Patterson.      5. . Cardiac calcium score 188 March 2020- atorvastatin 80 mg daily added.     6. Bilateral sacral insufficiency fractures 2/21- has healed.     7. Lung cancer screening-low-dose chest CT done 6/23 needs 1 year follow-up, which has been ordered.     8. Advanced Care planning-she has a living well. HPOA is  [place]. She is full code.     9. BMI 17-she thinks this is due to her stay in the rehab center and she did not like the food.  I have encouraged her to keep eating, that we need to watch her weight.  She has regained 2 pounds since November 2023.    10.  Medicare wellness visit- done 6/23.    11. ? UTI-he has been on Macrobid for 3 days without improvement in frequency.  Urinalysis and urine culture sent.     Plan:  Check labs: CBC with differential, CMP, CRP.  Prolia was given 1/05/24.  Urinalysis and urine culture ordered.  Next DEXA will be due December 2025.  Follow-up in 3 months.

## 2024-01-06 LAB
APPEARANCE UR: ABNORMAL
BILIRUB UR STRIP.AUTO-MCNC: NEGATIVE MG/DL
COLOR UR: YELLOW
GLUCOSE UR STRIP.AUTO-MCNC: NEGATIVE MG/DL
KETONES UR STRIP.AUTO-MCNC: NEGATIVE MG/DL
LEUKOCYTE ESTERASE UR QL STRIP.AUTO: ABNORMAL
MUCOUS THREADS #/AREA URNS AUTO: ABNORMAL /LPF
NITRITE UR QL STRIP.AUTO: NEGATIVE
PH UR STRIP.AUTO: 5 [PH]
PROT UR STRIP.AUTO-MCNC: NEGATIVE MG/DL
RBC # UR STRIP.AUTO: ABNORMAL /UL
RBC #/AREA URNS AUTO: ABNORMAL /HPF
SP GR UR STRIP.AUTO: 1
UROBILINOGEN UR STRIP.AUTO-MCNC: <2 MG/DL
WBC #/AREA URNS AUTO: >50 /HPF
WBC CLUMPS #/AREA URNS AUTO: ABNORMAL /HPF

## 2024-01-07 LAB — BACTERIA UR CULT: NORMAL

## 2024-01-31 ENCOUNTER — INFUSION (OUTPATIENT)
Dept: INFUSION THERAPY | Facility: CLINIC | Age: 69
End: 2024-01-31
Payer: MEDICARE

## 2024-01-31 VITALS
DIASTOLIC BLOOD PRESSURE: 77 MMHG | HEART RATE: 83 BPM | TEMPERATURE: 96.3 F | OXYGEN SATURATION: 100 % | BODY MASS INDEX: 17.86 KG/M2 | RESPIRATION RATE: 16 BRPM | SYSTOLIC BLOOD PRESSURE: 116 MMHG | WEIGHT: 104.06 LBS

## 2024-01-31 DIAGNOSIS — M05.761 RHEUMATOID ARTHRITIS INVOLVING BOTH KNEES WITH POSITIVE RHEUMATOID FACTOR (MULTI): ICD-10-CM

## 2024-01-31 DIAGNOSIS — M05.762 RHEUMATOID ARTHRITIS INVOLVING BOTH KNEES WITH POSITIVE RHEUMATOID FACTOR (MULTI): ICD-10-CM

## 2024-01-31 PROCEDURE — 96413 CHEMO IV INFUSION 1 HR: CPT | Performed by: REGISTERED NURSE

## 2024-01-31 RX ORDER — ALBUTEROL SULFATE 0.83 MG/ML
3 SOLUTION RESPIRATORY (INHALATION) AS NEEDED
Status: CANCELLED | OUTPATIENT
Start: 2024-03-13

## 2024-01-31 RX ORDER — DIPHENHYDRAMINE HYDROCHLORIDE 50 MG/ML
50 INJECTION INTRAMUSCULAR; INTRAVENOUS AS NEEDED
Status: CANCELLED | OUTPATIENT
Start: 2024-03-13

## 2024-01-31 RX ORDER — EPINEPHRINE 0.3 MG/.3ML
0.3 INJECTION SUBCUTANEOUS EVERY 5 MIN PRN
Status: CANCELLED | OUTPATIENT
Start: 2024-03-13

## 2024-01-31 RX ORDER — FAMOTIDINE 10 MG/ML
20 INJECTION INTRAVENOUS ONCE AS NEEDED
Status: CANCELLED | OUTPATIENT
Start: 2024-03-13

## 2024-01-31 ASSESSMENT — PAIN SCALES - GENERAL: PAINLEVEL: 8

## 2024-01-31 ASSESSMENT — ENCOUNTER SYMPTOMS
ARTHRALGIAS: 1
GASTROINTESTINAL NEGATIVE: 1
CARDIOVASCULAR NEGATIVE: 1
RESPIRATORY NEGATIVE: 1
CONSTITUTIONAL NEGATIVE: 1
NEUROLOGICAL NEGATIVE: 1

## 2024-01-31 NOTE — PATIENT INSTRUCTIONS
Today :We administered inFLIXimab (Remicade) 400 mg in sodium chloride 0.9% 250 mL IV.     For:   1. Rheumatoid arthritis involving both knees with positive rheumatoid factor (CMS/[facility])         Your next appointment is due in:  6 WEEKS        Please read the  Medication Guide that was given to you and reviewed during todays visit.     (Tell all doctors including dentists that you are taking this medication)     Go to the emergency room or call 911 if:  -You have signs of allergic reaction:   -Rash, hives, itching.   -Swollen, blistered, peeling skin.   -Swelling of face, lips, mouth, tongue or throat.   -Tightness of chest, trouble breathing, swallowing or talking     Call your doctor:  - If IV / injection site gets red, warm, swollen, itchy or leaks fluid or pus.     (Leave dressing on your IV site for at least 2 hours and keep area clean and dry  - If you get sick or have symptoms of infection or are not feeling well for any reason.    (Wash your hands often, stay away from people who are sick)  - If you have side effects from your medication that do not go away or are bothersome.     (Refer to the teaching your nurse gave you for side effects to call your doctor about)    - Common side effects may include:  stuffy nose, headache, feeling tired, muscle aches, upset stomach  - Before receiving any vaccines     - Call [facility] at   If:  - You get sick, are on antibiotics, have had a recent vaccine, have surgery or dental work and your doctor wants your visit rescheduled.  - You need to cancel and reschedule your visit for any reason. Call at least 2 days before your visit if you need to cancel.   - Your insurance changes before your next visit.    (We will need to get approval from your new insurance. This can take up to two weeks.)     The [facility] is opened Monday thru Friday. We are closed on weekends and holidays.   Voice mail will take your call if the center is closed.  If you leave a message please allow 24 hours for a call back during weekdays. If you leave a message on a weekend/holiday, we will call you back the next business day.

## 2024-01-31 NOTE — PROGRESS NOTES
[facility]   Infusion Clinic Note   Date: 2024   Name: Tatiana Hagan  : 1955   MRN: [id number]         Reason for Visit: OP Infusion (PATIENT HERE FOR REMICADE 400 MG INFUSION)      Accompanied by:Self   Visit Type:: Infusion   Diagnosis: Rheumatoid arthritis involving both knees with positive rheumatoid factor (CMS/[facility])    Allergies:   Allergies as of 2024 - Reviewed 2024   Allergen Reaction Noted   • Nsaids (non-steroidal anti-inflammatory drug) Other 2023   • Aspirin Other 2023   • Penicillins Rash 2023   • Sulfa (sulfonamide antibiotics) Nausea And Vomiting 2020      Current Meds has a current medication list which includes the following prescription(s): acetaminophen, betamethasone dipropionate, denosumab, gabapentin, hydrochlorothiazide, infliximab, lisinopril, omeprazole, and prednisone, and the following Facility-Administered Medications: denosumab.        Vitals:  Vitals:    24 1247   BP: 116/77   Pulse: 83   Resp: 16   Temp: 35.7 °C (96.3 °F)   SpO2: 100%   Weight: 47.2 kg (104 lb 0.9 oz)      Infusion Pre-procedure Checklist   Allergies reviewed: yes   Medications reviewed: yes   Contraindications to treatment:No   Previous reaction to current treatment:No   Current Health Issues: None   Pain: 8' Hip [18] (left)   Is the pain different from normal: Yes   Is the pain tolerable: yes   Is your Doctor aware: yes   Contraindications based on patient history: No   Provider notified: No   Labs: Labs reviewed   Fall Risk Screening: Anand Fall Risk  History of Falling, Immediate or Within 3 Months: No  Secondary Diagnosis: No  Ambulatory Aid: Walks without aid/bedrest/nurse assist  Intravenous Therapy/Heparin Lock: No  Gait/Transferring: Normal/bedrest/immobile  Mental Status: Oriented to own ability  Anand Fall Risk Score: 0    Review of Systems   Constitutional: Negative.    HENT:  Negative.     Respiratory: Negative.      Cardiovascular: Negative.    Gastrointestinal: Negative.    Musculoskeletal:  Positive for arthralgias.   Skin:  Positive for rash (PSORIASIS).   Neurological: Negative.       Negative for complaint: [] all other systems have been reviewed and are negative for complaint   Infusion Readiness:   Assessment Concerns Related to Infusion: No  Provider notified: n/a  Assess patient for the concerns below. Document provider notification as appropriate:  - Does not meet criteria to treat No  - Has an active or recent infection with/without current antibiotic use No  - Has recent/planned dental work No  - Has recent/planned surgeries No  - Has recently received or plans to receive vaccinations No  - Has treatment related toxicities No  - Is pregnant (unless noted otherwise) N/A    Initiated By: Bethanie Lazaro RN   Time: 1:10 PM     We administered inFLIXimab (Remicade) 400 mg in sodium chloride 0.9% 250 mL IV.       (Unless otherwise specified on patient specific therapy plan):     TREATMENT CONDITIONS:  Unless otherwise specified on patient specific therapy plan HOLD and notify Provider prior to proceeding with today's infusion if patient with:  o Positive T-Spot  o Reactive Hep B sAg and/or Hep B Core Antibody    Lab Results   Component Value Date    TBSIN Negative 06/13/2022    QFG NEGATIVE 08/28/2018

## 2024-02-08 ENCOUNTER — OFFICE VISIT (OUTPATIENT)
Dept: ORTHOPEDIC SURGERY | Facility: CLINIC | Age: 69
End: 2024-02-08
Payer: MEDICARE

## 2024-02-08 DIAGNOSIS — M54.50 LOW BACK PAIN, UNSPECIFIED BACK PAIN LATERALITY, UNSPECIFIED CHRONICITY, UNSPECIFIED WHETHER SCIATICA PRESENT: ICD-10-CM

## 2024-02-08 DIAGNOSIS — M25.552 HIP PAIN, ACUTE, LEFT: ICD-10-CM

## 2024-02-08 PROCEDURE — 1159F MED LIST DOCD IN RCRD: CPT | Performed by: ORTHOPAEDIC SURGERY

## 2024-02-08 PROCEDURE — 3008F BODY MASS INDEX DOCD: CPT | Performed by: ORTHOPAEDIC SURGERY

## 2024-02-08 PROCEDURE — 1160F RVW MEDS BY RX/DR IN RCRD: CPT | Performed by: ORTHOPAEDIC SURGERY

## 2024-02-08 PROCEDURE — 1125F AMNT PAIN NOTED PAIN PRSNT: CPT | Performed by: ORTHOPAEDIC SURGERY

## 2024-02-08 PROCEDURE — 99212 OFFICE O/P EST SF 10 MIN: CPT | Performed by: ORTHOPAEDIC SURGERY

## 2024-02-08 NOTE — PROGRESS NOTES
History of Present Illness:  Patient returns today endorsing mild pain .  Denies any numbness or tingling.  She states her pain is predominately in her buttock does not radiate down past her knee.    Physical Examination:  Well healed incisions, no erythema or drainage  Tolerates ROM and gentle log roll of hip without issues  + Plantar/dorsiflexion  Negative Low test  Mild pain with straight leg raise    Imaging:  Left    Assessment:  Patient status post left hip cephalomedullary nail doing well from a hip standpoint but concerned about lumbar degenerative disc disease and early radiculopathy    Plan:  Discussed oral medications limited in what she can take, encouraged formal physical therapy will have her follow-up with spine team for further evaluation if she fails to improve.

## 2024-03-13 ENCOUNTER — INFUSION (OUTPATIENT)
Dept: INFUSION THERAPY | Facility: CLINIC | Age: 69
End: 2024-03-13
Payer: MEDICARE

## 2024-03-13 VITALS
BODY MASS INDEX: 18.28 KG/M2 | RESPIRATION RATE: 16 BRPM | DIASTOLIC BLOOD PRESSURE: 71 MMHG | HEART RATE: 82 BPM | SYSTOLIC BLOOD PRESSURE: 111 MMHG | WEIGHT: 106.48 LBS | OXYGEN SATURATION: 99 % | TEMPERATURE: 96.2 F

## 2024-03-13 DIAGNOSIS — M05.762 RHEUMATOID ARTHRITIS INVOLVING BOTH KNEES WITH POSITIVE RHEUMATOID FACTOR (MULTI): ICD-10-CM

## 2024-03-13 DIAGNOSIS — M05.761 RHEUMATOID ARTHRITIS INVOLVING BOTH KNEES WITH POSITIVE RHEUMATOID FACTOR (MULTI): ICD-10-CM

## 2024-03-13 PROCEDURE — 96413 CHEMO IV INFUSION 1 HR: CPT | Performed by: REGISTERED NURSE

## 2024-03-13 RX ORDER — ALBUTEROL SULFATE 0.83 MG/ML
3 SOLUTION RESPIRATORY (INHALATION) AS NEEDED
Status: CANCELLED | OUTPATIENT
Start: 2024-04-24

## 2024-03-13 RX ORDER — EPINEPHRINE 0.3 MG/.3ML
0.3 INJECTION SUBCUTANEOUS EVERY 5 MIN PRN
Status: CANCELLED | OUTPATIENT
Start: 2024-04-24

## 2024-03-13 RX ORDER — DIPHENHYDRAMINE HYDROCHLORIDE 50 MG/ML
50 INJECTION INTRAMUSCULAR; INTRAVENOUS AS NEEDED
Status: CANCELLED | OUTPATIENT
Start: 2024-04-24

## 2024-03-13 RX ORDER — FAMOTIDINE 10 MG/ML
20 INJECTION INTRAVENOUS ONCE AS NEEDED
Status: CANCELLED | OUTPATIENT
Start: 2024-04-24

## 2024-03-13 ASSESSMENT — ENCOUNTER SYMPTOMS
RESPIRATORY NEGATIVE: 1
PSYCHIATRIC NEGATIVE: 1
NEUROLOGICAL NEGATIVE: 1
CONSTITUTIONAL NEGATIVE: 1
GASTROINTESTINAL NEGATIVE: 1
ENDOCRINE NEGATIVE: 1
MUSCULOSKELETAL NEGATIVE: 1
HEMATOLOGIC/LYMPHATIC NEGATIVE: 1
EYES NEGATIVE: 1
CARDIOVASCULAR NEGATIVE: 1

## 2024-03-13 NOTE — PATIENT INSTRUCTIONS
Today you received:  REMICADE 400MG INFUSION Q6 WEEKS    For:    1. Rheumatoid arthritis involving both knees with positive rheumatoid factor (CMS/[facility])         Please read the  Medication Guide that was given to you and reviewed during todays visit.     (Tell all doctors including dentists that you are taking this medication)     Go to the emergency room or call 911 if:  -You have signs of allergic reaction:   o         Rash, hives, itching.   o         Swollen, blistered, peeling skin.   o         Swelling of face, lips, mouth, tongue or throat.   o         Tightness of chest, trouble breathing, swallowing or talking      Call your doctor:     - If IV / injection site gets red, warm, swollen, itchy or leaks fluid or pus.     (Leave dressing on your IV site for at least 2 hours and keep area clean and dry    - If you get sick or have symptoms of infection or are not feeling well for any reason.    (Wash your hands often, stay away from people who are sick)    - If you have side effects from your medication that do not go away or are bothersome.     (Refer to the teaching your nurse gave you for side effects to call your doctor about)     Common side effects may include:  stuffy nose, headache, feeling tired, muscle aches, upset stomach    - Before receiving any vaccines, Call the [facility] at [phone]     - You get sick, are on antibiotics, have had a recent vaccine, have surgery or dental work and your doctor wants your visit rescheduled.    - You need to cancel and reschedule your visit for any reason. Call at least 2 days before your visit if you need to cancel.     - Your insurance changes before your next visit.    (We will need to get approval from your new insurance. This can take up to two weeks.)     [facility] is opened Monday thru Friday 8am-8pm and Saturday 8am-4:30pm. We are closed on holidays.     Voice mail will take your call if the center is closed. If you  leave a message please allow 24 hours for a call back during weekdays. If you leave a message on a weekend/holiday, we will call you back the next business day.

## 2024-03-13 NOTE — PROGRESS NOTES
[facility]   Infusion Clinic Note   Date: 2024   Name: Tatiana Hagan  : 1955   MRN: [id number]         Reason for Visit: OP Infusion (PATIENT HERE FOR HER REMICADE 400MG INFUSION )         Visit Type: INFUSION       Ordered By: CARMEN FAM MD      Accompanied by:Self      Diagnosis: Rheumatoid arthritis involving both knees with positive rheumatoid factor (CMS/[facility])       Allergies:   Allergies as of 2024 - Reviewed 2024   Allergen Reaction Noted    Nsaids (non-steroidal anti-inflammatory drug) Other 2023    Aspirin Other 2023    Penicillins Rash 2023    Sulfa (sulfonamide antibiotics) Nausea And Vomiting 2020         Current Medications has a current medication list which includes the following prescription(s): acetaminophen, betamethasone dipropionate, denosumab, gabapentin, hydrochlorothiazide, infliximab, lisinopril, omeprazole, and prednisone, and the following Facility-Administered Medications: denosumab.       Vitals:   Vitals:    24 1058   BP: 111/71   Pulse: 82   Resp: 16   Temp: 35.7 °C (96.2 °F)   SpO2: 99%   Weight: 48.3 kg (106 lb 7.7 oz)             Infusion Pre-procedure Checklist:   - Allergies reviewed: yes   - Medications reviewed: yes       - Previous reaction to current treatment: no      Assess patient for the concerns below. Document provider notification as appropriate.  - Active or recent infection with/without current antibiotic use: no  - Recent or planned invasive dental work: no  - Recent or planned surgeries: no  - Recently received or plans to receive vaccinations: no  - Has treatment related toxicities: no  - Is pregnant:  n/a      Pain: 0   - Is the pain different from normal: n/a   - Is the pain tolerable: n/a   - Is your Doctor aware:  n/a      Labs: N/A         Fall Risk Screening: Cheng Fall Risk  History of Falling, Immediate or Within 3 Months: No  Secondary Diagnosis: No  Ambulatory Aid:  Walks without aid/bedrest/nurse assist  Intravenous Therapy/Heparin Lock: No  Gait/Transferring: Normal/bedrest/immobile  Mental Status: Oriented to own ability  Anand Fall Risk Score: 0       Review Of Systems:  Review of Systems   Constitutional: Negative.    HENT:  Negative.     Eyes: Negative.    Respiratory: Negative.     Cardiovascular: Negative.    Gastrointestinal: Negative.    Endocrine: Negative.    Genitourinary: Negative.     Musculoskeletal: Negative.    Skin: Negative.    Neurological: Negative.    Hematological: Negative.    Psychiatric/Behavioral: Negative.           Infusion Readiness:   - Assessment Concerns Related to Infusion: No  - Provider notified: n/a      Document Below Only If Indicated:   New Patient Education:    N/A (returning patient for continuation of therapy. Ongoing education provided as needed.)        Treatment Conditions & Drug Specific Questions:    InFLIXimab  (AVSOLA, INFLECTRA, REMICADE, RENFLEXIS)    (Unless otherwise specified on patient specific therapy plan):     TREATMENT CONDITIONS:  Unless otherwise specified on patient specific therapy plan HOLD and notify Provider prior to proceeding with today's infusion if patient with:  o Positive T-Spot  o Reactive Hep B sAg and/or Hep B Core Antibody    Lab Results   Component Value Date    TBSIN Negative 06/13/2022    QFG NEGATIVE 08/28/2018        Labs reviewed and patient meets treatment conditions? Yes        Patient's weight today:   Vitals:    03/13/24 1058   Weight: 48.3 kg (106 lb 7.7 oz)            Initiated By: Madhu Alcala RN   Time: 1:02 PM     We administered inFLIXimab (Remicade) 400 mg in sodium chloride 0.9% 290 mL IV.

## 2024-04-09 ENCOUNTER — OFFICE VISIT (OUTPATIENT)
Dept: RHEUMATOLOGY | Facility: CLINIC | Age: 69
End: 2024-04-09
Payer: MEDICARE

## 2024-04-09 VITALS
TEMPERATURE: 98 F | WEIGHT: 107.6 LBS | OXYGEN SATURATION: 99 % | BODY MASS INDEX: 18.37 KG/M2 | SYSTOLIC BLOOD PRESSURE: 102 MMHG | HEART RATE: 76 BPM | HEIGHT: 64 IN | DIASTOLIC BLOOD PRESSURE: 62 MMHG

## 2024-04-09 DIAGNOSIS — L40.59 POLYARTICULAR PSORIATIC ARTHRITIS (MULTI): ICD-10-CM

## 2024-04-09 DIAGNOSIS — Z72.0 TOBACCO ABUSE DISORDER: ICD-10-CM

## 2024-04-09 DIAGNOSIS — Z72.0 TOBACCO USE: ICD-10-CM

## 2024-04-09 DIAGNOSIS — F17.218 NICOTINE DEPENDENCE, CIGARETTES, WITH OTHER NICOTINE-INDUCED DISORDERS: ICD-10-CM

## 2024-04-09 DIAGNOSIS — R93.1 AGATSTON CAC SCORE 100-199: Primary | ICD-10-CM

## 2024-04-09 DIAGNOSIS — M05.79 RHEUMATOID ARTHRITIS INVOLVING MULTIPLE SITES WITH POSITIVE RHEUMATOID FACTOR (MULTI): ICD-10-CM

## 2024-04-09 DIAGNOSIS — E78.5 HYPERLIPIDEMIA, UNSPECIFIED HYPERLIPIDEMIA TYPE: ICD-10-CM

## 2024-04-09 PROCEDURE — 1159F MED LIST DOCD IN RCRD: CPT | Performed by: INTERNAL MEDICINE

## 2024-04-09 PROCEDURE — 3074F SYST BP LT 130 MM HG: CPT | Performed by: INTERNAL MEDICINE

## 2024-04-09 PROCEDURE — 99214 OFFICE O/P EST MOD 30 MIN: CPT | Performed by: INTERNAL MEDICINE

## 2024-04-09 PROCEDURE — 3008F BODY MASS INDEX DOCD: CPT | Performed by: INTERNAL MEDICINE

## 2024-04-09 PROCEDURE — 1160F RVW MEDS BY RX/DR IN RCRD: CPT | Performed by: INTERNAL MEDICINE

## 2024-04-09 PROCEDURE — 1123F ACP DISCUSS/DSCN MKR DOCD: CPT | Performed by: INTERNAL MEDICINE

## 2024-04-09 PROCEDURE — 3078F DIAST BP <80 MM HG: CPT | Performed by: INTERNAL MEDICINE

## 2024-04-09 PROCEDURE — 1158F ADVNC CARE PLAN TLK DOCD: CPT | Performed by: INTERNAL MEDICINE

## 2024-04-09 RX ORDER — ATORVASTATIN CALCIUM 80 MG/1
80 TABLET, FILM COATED ORAL DAILY
Qty: 90 TABLET | Refills: 1 | Status: SHIPPED | OUTPATIENT
Start: 2024-04-09 | End: 2025-05-14

## 2024-04-09 ASSESSMENT — PATIENT HEALTH QUESTIONNAIRE - PHQ9
2. FEELING DOWN, DEPRESSED OR HOPELESS: NOT AT ALL
SUM OF ALL RESPONSES TO PHQ9 QUESTIONS 1 AND 2: 0
1. LITTLE INTEREST OR PLEASURE IN DOING THINGS: NOT AT ALL

## 2024-04-09 NOTE — PROGRESS NOTES
Subjective   Patient ID: Tatiana Hagan is a 69 y.o. female who presents for Follow-up.    HPI 68 -year-old woman with history of overlap of rheumatoid arthritis and psoriatic arthritis , history of osteoporosis and CPPD arthropathy , who is here for follow-up visit.      The patient fell and fractured her left hip September 29, 2023.  She still has some pain in the hip.  She is ambulating without assistance.  She had surgery and was discharged October 2, 2023.  She was discharged to rehab center for 2 weeks.    She is she is still having some pain in the left hip area, she is going to see Dr. Goldberg for second opinion regarding this.  The pain is mostly in the left buttock area, she has slight lateral hip pain.     She is slowly regaining weight she does state that her appetite is good..  Current weight is 107 pounds with BMI 18.5.    Last Prolia injection was July 13, 2023.  Prolia injection was given January 5, 2024.  Next Prolia injection is due July 5, 2024.     She had Kenalog injection of her right knee with good relief  11/29/23.    She remains on Remicade 6 mg/kg (400 mg total) IV every 6 weeks, prednisone 5 mg daily. She tried to taper her prednisone to 2.5 mg daily a few months ago but was unable to tolerate the taper.     This was discussed again April 9, 2024 MRI of LS spine done Feb 23rd, 2021 showed bilateral sacral insufficiency fractures.     Her psoriasis is stable.     She did have a cardiac calcium score 3/20 that was 188. Dr. Mo prescribed atorvastatin 80 mg daily.     She continues to smoke 1 pack/day which she has done for 50 years. She is not currently interested in quitting smoking- this was discussed 4/09/24.     She has a history of erosive esophagitis and esophageal strictures. Her last EGD and dilation was 3/19 with Dr. Cm.  She had follow up colonoscopy scheduled with Dr. Patterson November 2022, but it needs to be rescheduled. This was discussed 4/09/24.     She resumed Reclast 5/18  (this was given early because of a decline in BMD- she had been off treatment 2 years and 5 months). She received reclast again 7/19.     She started Prolia 10/20.      She had an EGD and colonoscopy with Dr. Renee 3/19. Biopsies at that time did not show evidence of Reyes's esophagus. She did have serrated polyps- was told to do 3 year f/u.     EKG 11/21: normal.     Labs June 2022: T spot negative, ESR 8, CBC normal except white blood cell count 11.9, CMP normal except sodium 135, cholesterol 183, H DL 74, LDL 90, triglycerides 96  Labs October 2022: BMP normal except sodium 131 and glucose 116  Labs 5/23: BMP normal except sodium 134.  Labs September 2023: BUN 50, creatinine 1.69, glucose 189  Labs October 2, 2023 : CBC normal except white blood cell count 14.3, hemoglobin 7.9, hematocrit 35.1, platelets 236, BMP normal except sodium 133 and glucose 56  Labs January 2024: CBC normal except white blood cell count 16.6, CMP except sodium 133, 25-hydroxy vitamin D 22, CRP less than 0.1      DEXA August 2015 shows femoral neck T score -3.0, total hip T score -3.0, lumbar spine T score -3.1.  She received Reclast 10/13, 11/14, and 12/15.  DEXA 9/17: T score -3.2 femoral neck (decline of 4.8%), T score -3.0 total hip, T score -1.9 lumbar spine.   Received reclast 4/18 (because of loss of BMD 9/17), 7/19.  DEXA 10/19: T score -3.3 femoral neck (stable), T score -3.4 total hip (decline of 7.3%), T score -2.6 lumbar spine (decline of 8.5%)  DEXA December 2021: T score -2.8 left femoral neck (increased 13.2%), T score -3.2 left total hip (increased 4.3%), T score -2.3 lumbar spine (increased 3.6%)  DEXA 12/23: T-score -3.4 left total hip, T-score -2.9 left femoral neck, T-score -1.9 LS spine (increased 5.4%)     Health maintenance:   Mammograms have been refused.   Colonoscopy 5/18- needs 3 year follow-up because of serrated polyps. Discussed again 4/09/24.   EGD 3/18: 3 cm hiatal hernia, erosive gastritis, ring in  "distal 1/3 of esophagus (dilated).. Repeated /19.   Pneumovax 2005, 5/21   Prevnar 13 1/18   Moderna COVID-19 vaccine March 3, 2021, April 3, 2021. Received booster 10/20/21 and 6/22. BV booster was given 10/06/22.   flu shot 9/21/21   eye exam 1/16   QuantiFERON-TB Gold negative 8/18  T spot negative 5/21   Low dose chest CT (for lung cancer screening) 7/23-needs 1 year follow-up.     Medical problem list:   - RA (, )- previously failed MTX.   Took MTX from 6806-4804 (not certain why it was stopped).   Took Humira from 4014-7400.   Changed to remicade 2012 (because of worsening of psoriasis).      - psoriatic arthritis-    - Essential hypertension   - h/o CPPD arthropathy knee 1/09   - Cardiac calcium score 188 March 2020   - Osteoporosis- took Forteo [id number].    Started reclast 2010-had infusions October 2013, November 2014, December 2015, May 2018, July 2019.    Started Prolia 10/20 (due to loss of BMD and hip on DEXA October 2019.    - h/o bilateral sacral insufficiency fractures 2/21        ROS:  General: Denies fevers or chills.  CV: Denies chest pain or palpitations.  Denies leg edema.  Lungs: Denies coughing or shortness of breath.  Skin: Denies rashes or nodules.  MS: Denies joint pain n(except left hip) or joint swelling.            Objective   /62 (BP Location: Left arm, Patient Position: Sitting, BP Cuff Size: Small adult)   Pulse 76   Temp 36.7 °C (98 °F)   Ht 1.626 m (5' 4\")   Wt 48.8 kg (107 lb 9.6 oz)   SpO2 99%   BMI 18.47 kg/m²     Physical Exam  HEENT: PERRL, EOMI  Neck: Supple, no nodes.  CV: RRR, no MGR.  Lungs: Clear, no rales or wheezes.  Abdomen: Soft, nontender. No hepatosplenomegaly.  Extremities:  No cyanosis, clubbing, or edema.  MS: Swollen: 0         Tender: 0         Patient global: 6         Evaluator global: 5          CDAI: 11 (moderate disease activity)            Skin: Plaques consistent with psoriasis on both elbows.  Has plaques in the pretibial area " of both legs.      Assessment/Plan   Problem List Items Addressed This Visit             ICD-10-CM    Hyperlipidemia E78.5    Relevant Orders    Lipid panel    Polyarticular psoriatic arthritis (CMS/HCC) L40.59    Rheumatoid arthritis (CMS/HCC) M06.9    Relevant Orders    Comprehensive metabolic panel    CBC and Auto Differential    Sedimentation Rate    BMI less than 19,adult Z68.1     Other Visit Diagnoses         Codes    Agatston CAC score 100-199    -  Primary R93.1    Relevant Medications    atorvastatin (Lipitor) 80 mg tablet    Tobacco use     Z72.0    Tobacco abuse disorder     Z72.0    Relevant Orders    CT lung screening low dose    Nicotine dependence, cigarettes, with other nicotine-induced disorders     F17.218    Relevant Orders    CT lung screening low dose              1. RA/ psoriatic arthritis- overall stable . She did not tolerate trying to reduce prednisone to 2.5 mg daily a few months ago , currently is back on 5 mg daily . Continue Remicade 6 mg/kg IV every 6 weeks.  Right knee was injected 11/23 with Kenalog.     2. Hypertension- at goal.      3. Osteoporosis- Took forteo 2008- 2010.   Received Reclast October 2013, November 2014, December 2015, then again May 2018 (because of loss of BMD) and 7/19. DEXA 10/19 shows loss of BMD in hip..   Started prolia 10/20. Last dose was 7/23.. Prolia was given 1/05/24.  Next dose is due July 5, 2024.  DEXA done December 2023 shows T-score -2.9 left femoral neck, T-score -3.4 left total hip, T-score -1.9 lumbar spine.  Bone density is stable in the hip, bone density has increased 5.4% in the lumbar spine.        4. History of erosive gastritis- continue Nexium. She had a recent episode of food sticking 7/21-I recommend that she follow-up with Dr. Cm to consider repeat EGD in case she needs to be dilated again. She is also due for her 3-year follow-up colonoscopy because of serrated polyps in March 2018.  This was discussed again in April 9, 2024 .  She  was advised to follow-up with Dr. Patterson.     5. . Cardiac calcium score 188 March 2020- atorvastatin 80 mg daily added.     6. Bilateral sacral insufficiency fractures 2/21- has healed.     7. Lung cancer screening-low-dose chest CT done 6/23 needs 1 year follow-up, which has been ordered.     8. Advanced Care planning-she has a living well. HPOA is  [place]. She is full code.     9. BMI 18-slowly improving since she left the rehab facility.    10.  Medicare wellness visit- done 6/23.    Plan:  Chest CT is due 7/24. Please talk to  to schedule.  Check labs after 12 hour fast: CBC with differential, CMP, lipids, ESR.  Follow up in 3 months for medicare Wellness Visit. Prolia will be due at this visit.

## 2024-04-09 NOTE — PATIENT INSTRUCTIONS
Chest CT is due 7/24. Please talk to  to schedule.  Check labs after 12 hour fast: CBC with differential, CMP, lipids, ESR.  Follow up in 3 months for medicare Wellness Visit. Prolia will be due at this visit.

## 2024-04-17 ENCOUNTER — HOSPITAL ENCOUNTER (OUTPATIENT)
Dept: RADIOLOGY | Facility: CLINIC | Age: 69
Discharge: HOME | End: 2024-04-17
Payer: MEDICARE

## 2024-04-17 ENCOUNTER — OFFICE VISIT (OUTPATIENT)
Dept: ORTHOPEDIC SURGERY | Facility: CLINIC | Age: 69
End: 2024-04-17
Payer: MEDICARE

## 2024-04-17 DIAGNOSIS — M25.552 HIP PAIN, ACUTE, LEFT: ICD-10-CM

## 2024-04-17 DIAGNOSIS — M70.62 TROCHANTERIC BURSITIS OF LEFT HIP: Primary | ICD-10-CM

## 2024-04-17 PROCEDURE — 1159F MED LIST DOCD IN RCRD: CPT | Performed by: ORTHOPAEDIC SURGERY

## 2024-04-17 PROCEDURE — 73502 X-RAY EXAM HIP UNI 2-3 VIEWS: CPT | Mod: LEFT SIDE | Performed by: ORTHOPAEDIC SURGERY

## 2024-04-17 PROCEDURE — 1160F RVW MEDS BY RX/DR IN RCRD: CPT | Performed by: ORTHOPAEDIC SURGERY

## 2024-04-17 PROCEDURE — 2500000005 HC RX 250 GENERAL PHARMACY W/O HCPCS: Performed by: ORTHOPAEDIC SURGERY

## 2024-04-17 PROCEDURE — 3008F BODY MASS INDEX DOCD: CPT | Performed by: ORTHOPAEDIC SURGERY

## 2024-04-17 PROCEDURE — 99214 OFFICE O/P EST MOD 30 MIN: CPT | Performed by: ORTHOPAEDIC SURGERY

## 2024-04-17 PROCEDURE — 2500000004 HC RX 250 GENERAL PHARMACY W/ HCPCS (ALT 636 FOR OP/ED): Performed by: ORTHOPAEDIC SURGERY

## 2024-04-17 PROCEDURE — 20610 DRAIN/INJ JOINT/BURSA W/O US: CPT | Mod: LT | Performed by: ORTHOPAEDIC SURGERY

## 2024-04-17 PROCEDURE — 73502 X-RAY EXAM HIP UNI 2-3 VIEWS: CPT | Mod: LT

## 2024-04-17 RX ADMIN — LIDOCAINE HYDROCHLORIDE 2 ML: 10 INJECTION, SOLUTION INFILTRATION; PERINEURAL at 15:09

## 2024-04-17 RX ADMIN — BETAMETHASONE ACETATE AND BETAMETHASONE SODIUM PHOSPHATE 1 ML: 3; 3 INJECTION, SUSPENSION INTRA-ARTICULAR; INTRALESIONAL; INTRAMUSCULAR; SOFT TISSUE at 15:09

## 2024-04-17 NOTE — PROGRESS NOTES
69-year-old female I seen in the remote past presents with a new complaint of left-sided hip pain.  She has got a complicated history as she had a intertrochanteric fracture on the left side treated surgically with a TFN by Dr. Quispe.  She states at the time of her last visit Dr. Quispe said everything was fine did not check an x-ray did not really evaluate her hip and she is been having pain along the lateral side of her hip ever since and she is worried that something may have gone wrong with her hip surgery.  Her pain is mainly along the lateral side of her hip she has pain at night pain when she lays on that side no particular groin pain no numbness or tingling    Location of pain: Lateral aspect of her left hip  Quality of pain: Chronic pain for several months seems to be getting worse  Modifying factors: Worse when she lays on that side or stands for prolonged period time better with rest  Associated signs and symptoms: Denies any numbness or tingling denies any groin pain.  Previous treatment: History of a TFN on that left side        The patient's past medical history, family history, social history, and review of systems were documented on the patient's medical intake form.  The medical intake form was reviewed and scanned into the electronic medical record for future use.  History is otherwise negative except as stated in the HPI.    Physical exam    General: Alert and oriented to place, person, and time.  No acute distress and breathing comfortably; pleasant and cooperative with the examination.  HEENT: Head is normocephalic and atraumatic.  Neck: Supple, no visible swelling.  Cardiovascular: Good perfusion to the affected extremity.  Lungs: No audible wheezing or labored breathing.  Abdomen: Nondistended  HEME/Lymph : No visible abnormalities bilateral lower extremity    Extremity:  Good range of motion of the hip.  Flexion abduction external rotation maneuver is negative.  Moderate tenderness to  palpation along the lateral side of the hip.  Pain with cross leg abduction.  Neurologically intact distally with full range of motion of the knee and ankle.  Good muscle strength distally with good sensation.  Compartments are soft calf is nontender.  Previous hip incisions are well-healed without signs of infection.    Diagnostics:      XR hip left with pelvis when performed 2 or 3 views    Result Date: 4/17/2024  Interpreted By:  Alissa Goldberg, STUDY: XR HIP LEFT WITH PELVIS WHEN PERFORMED 2 OR 3 VIEWS; 4/17/2024 2:35 pm   INDICATION: Signs/Symptoms:pain.   ACCESSION NUMBER(S): [id number]   ORDERING CLINICIAN: ALISSA GOLDBERG   FINDINGS: AP pelvis and lateral view of the left hip. Patient is status post trochanteric nail fixation of an inter troch fracture fracture healed in good alignment hardware in good position without displacement no other bony abnormality symphysis pubis shows some degenerative changes anteriorly     Signed by: Alissa Goldberg 4/17/2024 3:44 PM Dictation workstation:   [id number]         Procedures  See dictated procedure note    Assessment:  Trochanteric bursitis left hip status post left hip fracture    Treatment plan:  1.  The natural history of the condition and its associated treatment alternatives including surgical and nonsurgical options were discussed with the patient at length.  2.  Reassured the structure everything looks okay as far as her hip fracture is gone on to heal fine her hardware is in good position I think she has some bursitis of recommend a cortisone injection which is performed today with her consent without complication.  She is given home exercises for hip bursitis and she will follow-up with me in a month if she still in symptoms.  3. [   ]  4.  All of the patient's questions were answered.    Orders Placed This Encounter    Inj/Asp: L greater trochanteric bursa    XR hip left with pelvis when performed 2 or 3 views       This note was prepared using  voice recognition software.  The details of this note are correct and have been reviewed, and corrected to the best of my ability.  Some grammatical areas may persist related to the Dragon software    Dusty Goldberg MD  Senior Attending Physician  [facility]  [facility] [place]    [phone]    Inj/Asp: L greater trochanteric bursa on 4/17/2024 3:09 PM  Indications: pain and diagnostic evaluation  Details: 25 G needle, lateral approach  Medications: 2 mL lidocaine 10 mg/mL (1 %); 1 mL betamethasone acet,sod phos 6 mg/mL  Outcome: tolerated well, no immediate complications  Procedure, treatment alternatives, risks and benefits explained, specific risks discussed. Consent was given by the patient. Immediately prior to procedure a time out was called to verify the correct patient, procedure, equipment, support staff and site/side marked as required. Patient was prepped and draped in the usual sterile fashion.

## 2024-04-18 ENCOUNTER — APPOINTMENT (OUTPATIENT)
Dept: ORTHOPEDIC SURGERY | Facility: CLINIC | Age: 69
End: 2024-04-18
Payer: MEDICARE

## 2024-04-18 RX ORDER — BETAMETHASONE SODIUM PHOSPHATE AND BETAMETHASONE ACETATE 3; 3 MG/ML; MG/ML
1 INJECTION, SUSPENSION INTRA-ARTICULAR; INTRALESIONAL; INTRAMUSCULAR; SOFT TISSUE
Status: COMPLETED | OUTPATIENT
Start: 2024-04-17 | End: 2024-04-17

## 2024-04-18 RX ORDER — LIDOCAINE HYDROCHLORIDE 10 MG/ML
2 INJECTION INFILTRATION; PERINEURAL
Status: COMPLETED | OUTPATIENT
Start: 2024-04-17 | End: 2024-04-17

## 2024-04-24 ENCOUNTER — INFUSION (OUTPATIENT)
Dept: INFUSION THERAPY | Facility: CLINIC | Age: 69
End: 2024-04-24
Payer: MEDICARE

## 2024-04-24 VITALS
BODY MASS INDEX: 18.49 KG/M2 | OXYGEN SATURATION: 100 % | HEART RATE: 82 BPM | WEIGHT: 107.69 LBS | SYSTOLIC BLOOD PRESSURE: 113 MMHG | RESPIRATION RATE: 16 BRPM | TEMPERATURE: 98.3 F | DIASTOLIC BLOOD PRESSURE: 74 MMHG

## 2024-04-24 DIAGNOSIS — M05.761 RHEUMATOID ARTHRITIS INVOLVING BOTH KNEES WITH POSITIVE RHEUMATOID FACTOR (MULTI): ICD-10-CM

## 2024-04-24 DIAGNOSIS — M05.762 RHEUMATOID ARTHRITIS INVOLVING BOTH KNEES WITH POSITIVE RHEUMATOID FACTOR (MULTI): ICD-10-CM

## 2024-04-24 PROCEDURE — 96413 CHEMO IV INFUSION 1 HR: CPT | Performed by: REGISTERED NURSE

## 2024-04-24 RX ORDER — EPINEPHRINE 0.3 MG/.3ML
0.3 INJECTION SUBCUTANEOUS EVERY 5 MIN PRN
Status: CANCELLED | OUTPATIENT
Start: 2024-06-05

## 2024-04-24 RX ORDER — DIPHENHYDRAMINE HYDROCHLORIDE 50 MG/ML
50 INJECTION INTRAMUSCULAR; INTRAVENOUS AS NEEDED
Status: CANCELLED | OUTPATIENT
Start: 2024-06-05

## 2024-04-24 RX ORDER — FAMOTIDINE 10 MG/ML
20 INJECTION INTRAVENOUS ONCE AS NEEDED
Status: CANCELLED | OUTPATIENT
Start: 2024-06-05

## 2024-04-24 RX ORDER — ALBUTEROL SULFATE 0.83 MG/ML
3 SOLUTION RESPIRATORY (INHALATION) AS NEEDED
Status: CANCELLED | OUTPATIENT
Start: 2024-06-05

## 2024-04-24 ASSESSMENT — ENCOUNTER SYMPTOMS
EYES NEGATIVE: 1
HEMATOLOGIC/LYMPHATIC NEGATIVE: 1
NEUROLOGICAL NEGATIVE: 1
ENDOCRINE NEGATIVE: 1
CONSTITUTIONAL NEGATIVE: 1
GASTROINTESTINAL NEGATIVE: 1
CARDIOVASCULAR NEGATIVE: 1
ARTHRALGIAS: 1
RESPIRATORY NEGATIVE: 1

## 2024-04-24 NOTE — PATIENT INSTRUCTIONS
Today :We administered inFLIXimab (Remicade) 400 mg in sodium chloride 0.9% 250 mL IV.     For:   1. Rheumatoid arthritis involving both knees with positive rheumatoid factor (Multi)         Your next appointment is due in:  6 weeks        Please read the  Medication Guide that was given to you and reviewed during todays visit.     (Tell all doctors including dentists that you are taking this medication)     Go to the emergency room or call 911 if:  -You have signs of allergic reaction:   -Rash, hives, itching.   -Swollen, blistered, peeling skin.   -Swelling of face, lips, mouth, tongue or throat.   -Tightness of chest, trouble breathing, swallowing or talking     Call your doctor:  - If IV / injection site gets red, warm, swollen, itchy or leaks fluid or pus.     (Leave dressing on your IV site for at least 2 hours and keep area clean and dry  - If you get sick or have symptoms of infection or are not feeling well for any reason.    (Wash your hands often, stay away from people who are sick)  - If you have side effects from your medication that do not go away or are bothersome.     (Refer to the teaching your nurse gave you for side effects to call your doctor about)    - Common side effects may include:  stuffy nose, headache, feeling tired, muscle aches, upset stomach  - Before receiving any vaccines     - Call the [facility] at [phone]  If:  - You get sick, are on antibiotics, have had a recent vaccine, have surgery or dental work and your doctor wants your visit rescheduled.  - You need to cancel and reschedule your visit for any reason. Call at least 2 days before your visit if you need to cancel.   - Your insurance changes before your next visit.    (We will need to get approval from your new insurance. This can take up to two weeks.)     The [facility] is opened Monday thru Friday. We are closed on weekends and holidays.   Voice mail will take your call if the center  is closed. If you leave a message please allow 24 hours for a call back during weekdays. If you leave a message on a weekend/holiday, we will call you back the next business day.

## 2024-04-24 NOTE — PROGRESS NOTES
[facility]   Infusion Clinic Note   Date: 2024   Name: Tatiana Hagan  : 1955   MRN: [id number]         Reason for Visit: OP Infusion (400 mg Remicade Infusion)         Visit Type: INFUSION             Accompanied by:Self      Diagnosis: Rheumatoid arthritis involving both knees with positive rheumatoid factor (Multi)       Allergies:   Allergies as of 2024 - Reviewed 2024   Allergen Reaction Noted   • Nsaids (non-steroidal anti-inflammatory drug) Other 2023   • Aspirin Other 2023   • Penicillins Rash 2023   • Sulfa (sulfonamide antibiotics) Nausea And Vomiting 2020         Current Medications has a current medication list which includes the following prescription(s): acetaminophen, atorvastatin, betamethasone dipropionate, denosumab, gabapentin, hydrochlorothiazide, infliximab, lisinopril, omeprazole, and prednisone, and the following Facility-Administered Medications: denosumab.       Vitals:   Vitals:    24 1105   BP: 113/74   Pulse: 82   Resp: 16   Temp: 36.8 °C (98.3 °F)   SpO2: 100%   Weight: 48.9 kg (107 lb 11.1 oz)             Infusion Pre-procedure Checklist:   - Allergies reviewed: yes   - Medications reviewed: yes       - Previous reaction to current treatment: no      Assess patient for the concerns below. Document provider notification as appropriate.  - Active or recent infection with/without current antibiotic use: no  - Recent or planned invasive dental work: no  - Recent or planned surgeries: no  - Recently received or plans to receive vaccinations: no  - Has treatment related toxicities: no  - Is pregnant:  no      Pain: 7   - Is the pain different from normal: no   - Is the pain tolerable: yes   - Is your Doctor aware:  yes      Labs: N/A         Fall Risk Screening: Cheng Fall Risk  History of Falling, Immediate or Within 3 Months: No  Secondary Diagnosis: No  Ambulatory Aid: Walks without aid/bedrest/nurse  "assist  Intravenous Therapy/Heparin Lock: No  Gait/Transferring: Normal/bedrest/immobile  Mental Status: Oriented to own ability  Anand Fall Risk Score: 0       Review Of Systems:  Review of Systems   Constitutional: Negative.    HENT:  Negative.     Eyes: Negative.    Respiratory: Negative.     Cardiovascular: Negative.    Gastrointestinal: Negative.    Endocrine: Negative.    Genitourinary: Negative.     Musculoskeletal:  Positive for arthralgias.   Skin: Negative.    Neurological: Negative.    Hematological: Negative.    Psychiatric/Behavioral:          Not assessed           Infusion Readiness:   - Assessment Concerns Related to Infusion: No  - Provider notified: n/a      Document Below Only If Indicated:   New Patient Education:    N/A (returning patient for continuation of therapy. Ongoing education provided as needed.)        Treatment Conditions & Drug Specific Questions:    InFLIXimab  (AVSOLA, INFLECTRA, REMICADE, RENFLEXIS)    (Unless otherwise specified on patient specific therapy plan):     TREATMENT CONDITIONS:  Unless otherwise specified on patient specific therapy plan HOLD and notify Provider prior to proceeding with today's infusion if patient with:  o Positive T-Spot  o Reactive Hep B sAg and/or Hep B Core Antibody    Lab Results   Component Value Date    TBSIN Negative 06/13/2022    QFG NEGATIVE 08/28/2018      No results found for: \"HAGCN\", \"HEPBSURABI\", \"HBSAG\", \"XHAGF\", \"HEPBSAG\", \"EXTHEPBSAG\", \"NONUHSWGH\"   No results found for: \"NONUHFIRE\", \"NONUHSWGH\", \"NONUHFISH\", \"EXTHEPBSAG\"  No results found for: \"HBCTI\", \"HEPBCAB\"  No results found for: \"HEPATOT\", \"HEPAIGM\", \"HEPBCIGM\", \"HEPBCAB\", \"HBEAG\", \"HEPCAB\"     Labs reviewed and patient meets treatment conditions? Yes    DRUG SPECIFIC QUESTIONS:    Any new or worsening signs / symptoms of heart failure which may include things like worsening shortness of breath, swelling, fatigue? No   (If yes notify provider before proceeding with today's " infusion. New onset or worsening HF have been reported with infliximab)    REMINDER:   WEIGHT BASED DRUG     Recommended Vitals/Observation:  Vitals:     Induction: Obtain vital signs every 30 minutes; at end of observation period and as needed.     Maintenance: Obtain vital signs at start and end of infusions  Observation:     Induction: Patient is monitored for 30 minutes post-infusion     Maintenance: No observation.        Weight Based Drug Calculations:  Dosing weight not available      Patient's weight today:   Vitals:    04/24/24 1105   Weight: 48.9 kg (107 lb 11.1 oz)              Initiated By: Albertina Amin RN   Time: 11:49 AM     We administered inFLIXimab (Remicade) 400 mg in sodium chloride 0.9% 250 mL IV.

## 2024-05-02 ENCOUNTER — APPOINTMENT (OUTPATIENT)
Dept: ORTHOPEDIC SURGERY | Facility: CLINIC | Age: 69
End: 2024-05-02
Payer: MEDICARE

## 2024-05-08 ENCOUNTER — APPOINTMENT (OUTPATIENT)
Dept: ORTHOPEDIC SURGERY | Facility: CLINIC | Age: 69
End: 2024-05-08
Payer: MEDICARE

## 2024-05-09 ENCOUNTER — APPOINTMENT (OUTPATIENT)
Dept: ORTHOPEDIC SURGERY | Facility: CLINIC | Age: 69
End: 2024-05-09
Payer: MEDICARE

## 2024-06-06 ENCOUNTER — TELEPHONE (OUTPATIENT)
Dept: PRIMARY CARE | Facility: CLINIC | Age: 69
End: 2024-06-06
Payer: MEDICARE

## 2024-06-11 NOTE — TELEPHONE ENCOUNTER
Spoke with patient and she stated that she has a coming up apmt. With Dr. Doan at 1:30 pm, she wants to come in the same day, I will talk to Keli, to put her on the schedule for that day.

## 2024-06-13 ENCOUNTER — CLINICAL SUPPORT (OUTPATIENT)
Dept: PRIMARY CARE | Facility: CLINIC | Age: 69
End: 2024-06-13
Payer: MEDICARE

## 2024-06-13 DIAGNOSIS — M81.0 AGE-RELATED OSTEOPOROSIS WITHOUT CURRENT PATHOLOGICAL FRACTURE: Primary | ICD-10-CM

## 2024-06-13 PROCEDURE — 96372 THER/PROPH/DIAG INJ SC/IM: CPT | Performed by: INTERNAL MEDICINE

## 2024-06-20 ENCOUNTER — OFFICE VISIT (OUTPATIENT)
Dept: ORTHOPEDIC SURGERY | Facility: CLINIC | Age: 69
End: 2024-06-20
Payer: MEDICARE

## 2024-06-20 ENCOUNTER — APPOINTMENT (OUTPATIENT)
Dept: RADIOLOGY | Facility: HOSPITAL | Age: 69
End: 2024-06-20
Payer: MEDICARE

## 2024-06-20 ENCOUNTER — HOSPITAL ENCOUNTER (EMERGENCY)
Facility: HOSPITAL | Age: 69
Discharge: OTHER INSTITUTION | End: 2024-06-20
Attending: EMERGENCY MEDICINE
Payer: MEDICARE

## 2024-06-20 ENCOUNTER — HOSPITAL ENCOUNTER (OUTPATIENT)
Dept: RADIOLOGY | Facility: CLINIC | Age: 69
Discharge: HOME | End: 2024-06-20
Payer: MEDICARE

## 2024-06-20 VITALS
OXYGEN SATURATION: 100 % | RESPIRATION RATE: 18 BRPM | SYSTOLIC BLOOD PRESSURE: 136 MMHG | HEART RATE: 92 BPM | WEIGHT: 105 LBS | TEMPERATURE: 97.5 F | BODY MASS INDEX: 17.93 KG/M2 | HEIGHT: 64 IN | DIASTOLIC BLOOD PRESSURE: 65 MMHG

## 2024-06-20 DIAGNOSIS — M19.049 HAND ARTHRITIS: ICD-10-CM

## 2024-06-20 PROCEDURE — 1159F MED LIST DOCD IN RCRD: CPT | Performed by: ORTHOPAEDIC SURGERY

## 2024-06-20 PROCEDURE — 99213 OFFICE O/P EST LOW 20 MIN: CPT | Performed by: ORTHOPAEDIC SURGERY

## 2024-06-20 PROCEDURE — 99284 EMERGENCY DEPT VISIT MOD MDM: CPT | Performed by: EMERGENCY MEDICINE

## 2024-06-20 PROCEDURE — 73130 X-RAY EXAM OF HAND: CPT | Mod: RT

## 2024-06-20 PROCEDURE — 73130 X-RAY EXAM OF HAND: CPT | Mod: RIGHT SIDE | Performed by: ORTHOPAEDIC SURGERY

## 2024-06-20 PROCEDURE — 3008F BODY MASS INDEX DOCD: CPT | Performed by: ORTHOPAEDIC SURGERY

## 2024-06-20 PROCEDURE — 99214 OFFICE O/P EST MOD 30 MIN: CPT | Performed by: ORTHOPAEDIC SURGERY

## 2024-06-20 PROCEDURE — 99281 EMR DPT VST MAYX REQ PHY/QHP: CPT | Performed by: EMERGENCY MEDICINE

## 2024-06-20 RX ORDER — METOCLOPRAMIDE HYDROCHLORIDE 5 MG/ML
10 INJECTION INTRAMUSCULAR; INTRAVENOUS ONCE
Status: DISCONTINUED | OUTPATIENT
Start: 2024-06-20 | End: 2024-06-21 | Stop reason: HOSPADM

## 2024-06-20 ASSESSMENT — COLUMBIA-SUICIDE SEVERITY RATING SCALE - C-SSRS
2. HAVE YOU ACTUALLY HAD ANY THOUGHTS OF KILLING YOURSELF?: NO
1. IN THE PAST MONTH, HAVE YOU WISHED YOU WERE DEAD OR WISHED YOU COULD GO TO SLEEP AND NOT WAKE UP?: NO
6. HAVE YOU EVER DONE ANYTHING, STARTED TO DO ANYTHING, OR PREPARED TO DO ANYTHING TO END YOUR LIFE?: NO

## 2024-06-20 ASSESSMENT — LIFESTYLE VARIABLES
TOTAL SCORE: 0
HAVE PEOPLE ANNOYED YOU BY CRITICIZING YOUR DRINKING: NO
EVER FELT BAD OR GUILTY ABOUT YOUR DRINKING: NO
HAVE YOU EVER FELT YOU SHOULD CUT DOWN ON YOUR DRINKING: NO
EVER HAD A DRINK FIRST THING IN THE MORNING TO STEADY YOUR NERVES TO GET RID OF A HANGOVER: NO

## 2024-06-20 ASSESSMENT — PAIN - FUNCTIONAL ASSESSMENT: PAIN_FUNCTIONAL_ASSESSMENT: 0-10

## 2024-06-20 ASSESSMENT — PAIN SCALES - GENERAL
PAINLEVEL_OUTOF10: 0 - NO PAIN
PAINLEVEL_OUTOF10: 0 - NO PAIN

## 2024-06-20 NOTE — PROGRESS NOTES
History present illness: Patient presents today for evaluation of her right upper extremity.  She has history of rheumatoid arthritis and psoriatic arthritis and hypertension.  She describes deformity to the right wrist.  There was concern upon discussion with her rheumatologist that she potentially may end up with impending rupture of the extensor tendons.  She presents today to discuss options.  She is not painful at all to that right wrist.      Past medical history: The patient's past medical history, family history, social history, and review of systems were documented on the patient medical intake.  The updated data was reviewed in the electronic medical record.  History is negative except otherwise stated in history of present illness.        Physical examination:  General: Alert and oriented to person, place, and time.  No acute distress and breathing comfortably: Pleasant and cooperative with examination.  HEENT: Head is normocephalic and atraumatic.  Neck: Supple, no visible swelling.  Cardiovascular: No palpable tachycardia  Lungs: No audible wheezing or labored breathing  Abdomen: Nondistended.  Extremities: Evaluation of the right upper extremity finds the patient had palpable radial artery at the wrist with brisk capillary refill to all digits.  Patient has intact sensation to axillary radial median and ulnar nerves.  There are no open wounds.  There are no signs of infection.  There is no evidence of lymphedema or lymphatic streaking.  The patient has supple compartments to right arm forearm and hand.  Stiffness to left wrist through flexion and extension arc.  Prominent distal ulna that is nontender.  No crepitus or synovitic change about the ulnar digital extensor tendons.  Specifically the digit he minimi tendon appears to be functioning normally and in a pain-free fashion.      Radiology: X-rays of the right wrist demonstrate profound arthritic change involving both radiocarpal and midcarpal  joints along with DRUJ      Assessment: Rheumatoid arthritis affecting right wrist      Plan: Treatment options were discussed.  We talked about attritional rupture of extensor tendons secondary to chronic DRUJ arthritis.  We talked about the option of resection of the distal ulna to limit the likelihood for tendon rupture.  We further explained that most people will get a prodrome of discomfort prior to extensor tendon rupture.  She does not want to do this resection of the distal ulna and a prophylactic way.  She is willing to concede that if pain does occur then she would move forward with resection of the distal ulna.  She understands there is a possibility for a spontaneous rupture of extensor tendon that would then be dealt with by resection of distal ulna with tendon transfer to restore extension to the affected digit.  For now we are going to wait and watch.  If she experiences any type of prodrome symptoms she is to be worked in my office right away even for a same-day visit.        Procedure:

## 2024-06-21 NOTE — ED PROVIDER NOTES
EMERGENCY DEPARTMENT ENCOUNTER      Pt Name: Tatiana Hagan  MRN: [id number]  Birthdate 1955  Date of evaluation: 6/20/2024  Provider: Marnie Flores DO    CHIEF COMPLAINT       Chief Complaint   Patient presents with    meat stuck in throat, no difficulty breathing         HISTORY OF PRESENT ILLNESS    HPI   69-year-old female who presents to the emergency department with globus sensation in her esophagus, states that she was eating roast beef and felt it get stuck, she has had this happen before and had to have dilation of her esophagus performed, last time this happened was approximately 2 years ago.  She has no difficulty breathing, but states that every time she tries to swallow her spit she ends up throwing it up.  No fevers, no vomiting, no belly pain.          Nursing Notes were reviewed.       PAST MEDICAL HISTORY   Patient History   Past Medical History:   Diagnosis Date    Age-related osteoporosis without current pathological fracture     Osteoporosis    Reyes's esophagus without dysplasia 11/16/2019    Reyes's esophagus without dysplasia    Candidal esophagitis (Multi) 09/13/2018    Esophageal candidiasis    Encounter for gynecological examination (general) (routine) without abnormal findings     Encounter for gynecological examination without abnormal finding    Nonspecific reaction to tuberculin skin test without active tuberculosis     Nonspecific reaction to tuberculin skin test without active tuberculosis    Other conditions influencing health status     X-Ray    Other conditions influencing health status     Visit For: Routine Eye Exam    Personal history of other diseases of the musculoskeletal system and connective tissue     History of pseudogout    Rheumatoid arthritis, unspecified (Multi) 09/11/2018    Rheumatoid arthritis         SURGICAL HISTORY       Past Surgical History:   Procedure Laterality Date    CARPAL TUNNEL RELEASE  09/24/2014    Neuroplasty Decompression Median Nerve At  Carpal Tunnel    HIP FRACTURE SURGERY      TONSILLECTOMY  09/24/2014    Tonsillectomy         CURRENT MEDICATIONS       Previous Medications    ACETAMINOPHEN (TYLENOL 8 HOUR) 650 MG ER TABLET    Take 2 tablets (1,300 mg) by mouth every 8 hours if needed for mild pain (1 - 3).    ATORVASTATIN (LIPITOR) 80 MG TABLET    Take 1 tablet (80 mg) by mouth once daily.    BETAMETHASONE DIPROPIONATE (DIPROSONE) 0.05 % OINTMENT    Apply 1 Application topically once daily as needed for rash.    DENOSUMAB (PROLIA) 60 MG/ML SYRINGE    Inject 1 mL (60 mg total) under the skin every 6 months.    GABAPENTIN (NEURONTIN) 100 MG CAPSULE    Take 1 capsule (100 mg) by mouth 3 times a day as needed (pain).    HYDROCHLOROTHIAZIDE (HYDRODIURIL) 25 MG TABLET    Take 1 tablet (25 mg) by mouth once daily.    INFLIXIMAB (REMICADE) 100 MG INJECTION    Infuse 6mg/kg (400mg dose) IV once every 6 weeks    LISINOPRIL 5 MG TABLET    Take 1 tablet (5 mg) by mouth once daily.    OMEPRAZOLE (PRILOSEC) 20 MG DR CAPSULE    Take 1 capsule (20 mg) by mouth once daily in the morning. Take before meals. Do not crush or chew.    PREDNISONE (DELTASONE) 5 MG TABLET    Take 1 tablet (5 mg) by mouth once daily.       ALLERGIES     Nsaids (non-steroidal anti-inflammatory drug), Aspirin, Penicillins, and Sulfa (sulfonamide antibiotics)    FAMILY HISTORY       Family History   Problem Relation Name Age of Onset    Atrial fibrillation Mother      Hypertension Mother      Liver cancer Mother      Ovarian cancer Mother      Diabetes Father      Cancer Father      Cancer Sister            SOCIAL HISTORY       Social History     Socioeconomic History    Marital status:      Spouse name: Not on file    Number of children: Not on file    Years of education: Not on file    Highest education level: Not on file   Occupational History    Not on file   Tobacco Use    Smoking status: Every Day     Current packs/day: 1.00     Average packs/day: 1 pack/day for 50.0 years  (50.0 ttl pk-yrs)     Types: Cigarettes    Smokeless tobacco: Never   Substance and Sexual Activity    Alcohol use: Not Currently    Drug use: Not Currently    Sexual activity: Not on file   Other Topics Concern    Not on file   Social History Narrative    Not on file     Social Determinants of Health     Financial Resource Strain: Not on file   Food Insecurity: Not on file   Transportation Needs: Not on file   Physical Activity: Not on file   Stress: Not on file   Social Connections: Not on file   Intimate Partner Violence: Not on file   Housing Stability: Not on file       SCREENINGS                             PHYSICAL EXAM    (up to 7 for level 4, 8 or more for level 5)   Physical Exam   ED Triage Vitals [06/20/24 2119]   Temperature Heart Rate Respirations BP   36.4 °C (97.5 °F) 92 18 136/65      Pulse Ox Temp Source Heart Rate Source Patient Position   100 % Temporal Monitor Sitting      BP Location FiO2 (%)     Right arm --       Physical Exam  Vitals and nursing note reviewed.   Constitutional:       General: She is not in acute distress.     Appearance: She is well-developed.   HENT:      Head: Normocephalic and atraumatic.      Mouth/Throat:      Mouth: Mucous membranes are dry.      Comments: Upper dentures in place  Eyes:      Conjunctiva/sclera: Conjunctivae normal.   Cardiovascular:      Rate and Rhythm: Normal rate and regular rhythm.      Heart sounds: No murmur heard.  Pulmonary:      Effort: Pulmonary effort is normal. No respiratory distress.      Breath sounds: Normal breath sounds.   Abdominal:      Palpations: Abdomen is soft.      Tenderness: There is no abdominal tenderness.   Musculoskeletal:         General: No swelling.      Cervical back: Neck supple.   Skin:     General: Skin is warm and dry.      Capillary Refill: Capillary refill takes less than 2 seconds.   Neurological:      Mental Status: She is alert.   Psychiatric:         Mood and Affect: Mood normal.          DIAGNOSTIC RESULTS      LABS:  Labs Reviewed   CBC WITH AUTO DIFFERENTIAL   COMPREHENSIVE METABOLIC PANEL   PROTIME-INR       All other labs were within normal range or not returned as of this dictation.    Imaging  No orders to display           Procedures  Procedures     EMERGENCY DEPARTMENT COURSE/MDM:   Tatiana Hagan is a 69 y.o. female presenting to the ED for evaluation of had concerns including meat stuck in throat, no difficulty breathing..   Medical Decision Making  69-year-old female with globus sensation in the esophagus and inability to keep down her secretions however no airway compromise.  Glucagon and Reglan were ordered for her as well as basic labs and a chest x-ray, however the patient stated she did not want to stay overnight if the meds did not work, and eloped prior to laboratory studies and medication administration.                External records reviewed: I reviewed external records including outpatient, PCP records, and prior discharge summaries    I have reviewed this case with the ED attending physician, and the attending agrees with the plan. Patient or family was counselled regarding labs, imaging, likely diagnosis, and plan. All questions were answered.     Marnie Flores DO  PGY-4, emergency medicine    The above documentation was completed with the use of speech recognition software. It may contain dictation errors secondary to limitations of the software.      ED Medications administered this visit:    Medications   glucagon (Glucagen) injection 1 mg (has no administration in time range)   metoclopramide (Reglan) injection 10 mg (has no administration in time range)       New Prescriptions from this visit:    New Prescriptions    No medications on file       Final Impression: No diagnosis found.      (Please note that portions of this note were completed with a voice recognition program.  Efforts were made to edit the dictations but occasionally words are mis-transcribed.)     Marnie Flores,  DO  Resident  06/20/24 [id number]

## 2024-06-24 DIAGNOSIS — I10 PRIMARY HYPERTENSION: ICD-10-CM

## 2024-06-24 RX ORDER — LISINOPRIL 5 MG/1
5 TABLET ORAL DAILY
Qty: 90 TABLET | Refills: 1 | Status: SHIPPED | OUTPATIENT
Start: 2024-06-24

## 2024-06-25 ENCOUNTER — LAB (OUTPATIENT)
Dept: LAB | Facility: LAB | Age: 69
End: 2024-06-25
Payer: MEDICARE

## 2024-06-25 DIAGNOSIS — E78.5 HYPERLIPIDEMIA, UNSPECIFIED HYPERLIPIDEMIA TYPE: ICD-10-CM

## 2024-06-25 DIAGNOSIS — M05.79 RHEUMATOID ARTHRITIS INVOLVING MULTIPLE SITES WITH POSITIVE RHEUMATOID FACTOR (MULTI): ICD-10-CM

## 2024-06-25 PROCEDURE — 85025 COMPLETE CBC W/AUTO DIFF WBC: CPT

## 2024-06-25 PROCEDURE — 80053 COMPREHEN METABOLIC PANEL: CPT

## 2024-06-25 PROCEDURE — 85652 RBC SED RATE AUTOMATED: CPT

## 2024-06-25 PROCEDURE — 80061 LIPID PANEL: CPT

## 2024-06-25 PROCEDURE — 36415 COLL VENOUS BLD VENIPUNCTURE: CPT

## 2024-06-26 LAB
ALBUMIN SERPL BCP-MCNC: 4.1 G/DL (ref 3.4–5)
ALP SERPL-CCNC: 57 U/L (ref 33–136)
ALT SERPL W P-5'-P-CCNC: 7 U/L (ref 7–45)
ANION GAP SERPL CALC-SCNC: 15 MMOL/L (ref 10–20)
AST SERPL W P-5'-P-CCNC: 13 U/L (ref 9–39)
BASOPHILS # BLD AUTO: 0.1 X10*3/UL (ref 0–0.1)
BASOPHILS NFR BLD AUTO: 0.8 %
BILIRUB SERPL-MCNC: 0.3 MG/DL (ref 0–1.2)
BUN SERPL-MCNC: 18 MG/DL (ref 6–23)
CALCIUM SERPL-MCNC: 9.7 MG/DL (ref 8.6–10.6)
CHLORIDE SERPL-SCNC: 97 MMOL/L (ref 98–107)
CHOLEST SERPL-MCNC: 178 MG/DL (ref 0–199)
CHOLESTEROL/HDL RATIO: 2.7
CO2 SERPL-SCNC: 28 MMOL/L (ref 21–32)
CREAT SERPL-MCNC: 0.89 MG/DL (ref 0.5–1.05)
EGFRCR SERPLBLD CKD-EPI 2021: 69 ML/MIN/1.73M*2
EOSINOPHIL # BLD AUTO: 0.82 X10*3/UL (ref 0–0.4)
EOSINOPHIL NFR BLD AUTO: 6.8 %
ERYTHROCYTE [DISTWIDTH] IN BLOOD BY AUTOMATED COUNT: 14.5 % (ref 11.5–14.5)
ERYTHROCYTE [SEDIMENTATION RATE] IN BLOOD BY WESTERGREN METHOD: 12 MM/H (ref 0–30)
GLUCOSE SERPL-MCNC: 114 MG/DL (ref 74–99)
HCT VFR BLD AUTO: 42 % (ref 36–46)
HDLC SERPL-MCNC: 65.1 MG/DL
HGB BLD-MCNC: 13 G/DL (ref 12–16)
IMM GRANULOCYTES # BLD AUTO: 0.06 X10*3/UL (ref 0–0.5)
IMM GRANULOCYTES NFR BLD AUTO: 0.5 % (ref 0–0.9)
LDLC SERPL CALC-MCNC: 81 MG/DL
LYMPHOCYTES # BLD AUTO: 2.64 X10*3/UL (ref 0.8–3)
LYMPHOCYTES NFR BLD AUTO: 21.8 %
MCH RBC QN AUTO: 27.4 PG (ref 26–34)
MCHC RBC AUTO-ENTMCNC: 31 G/DL (ref 32–36)
MCV RBC AUTO: 89 FL (ref 80–100)
MONOCYTES # BLD AUTO: 0.92 X10*3/UL (ref 0.05–0.8)
MONOCYTES NFR BLD AUTO: 7.6 %
NEUTROPHILS # BLD AUTO: 7.59 X10*3/UL (ref 1.6–5.5)
NEUTROPHILS NFR BLD AUTO: 62.5 %
NON HDL CHOLESTEROL: 113 MG/DL (ref 0–149)
NRBC BLD-RTO: 0 /100 WBCS (ref 0–0)
PLATELET # BLD AUTO: 338 X10*3/UL (ref 150–450)
POTASSIUM SERPL-SCNC: 4.3 MMOL/L (ref 3.5–5.3)
PROT SERPL-MCNC: 6.6 G/DL (ref 6.4–8.2)
RBC # BLD AUTO: 4.74 X10*6/UL (ref 4–5.2)
SODIUM SERPL-SCNC: 136 MMOL/L (ref 136–145)
TRIGL SERPL-MCNC: 161 MG/DL (ref 0–149)
VLDL: 32 MG/DL (ref 0–40)
WBC # BLD AUTO: 12.1 X10*3/UL (ref 4.4–11.3)

## 2024-06-28 DIAGNOSIS — L40.9 PSORIASIS: ICD-10-CM

## 2024-06-28 RX ORDER — BETAMETHASONE DIPROPIONATE 0.5 MG/G
1 OINTMENT TOPICAL DAILY PRN
Qty: 15 G | Refills: 2 | Status: SHIPPED | OUTPATIENT
Start: 2024-06-28

## 2024-07-02 ENCOUNTER — APPOINTMENT (OUTPATIENT)
Dept: PRIMARY CARE | Facility: CLINIC | Age: 69
End: 2024-07-02
Payer: MEDICARE

## 2024-07-15 ENCOUNTER — APPOINTMENT (OUTPATIENT)
Dept: RADIOLOGY | Facility: CLINIC | Age: 69
End: 2024-07-15
Payer: MEDICARE

## 2024-07-16 ENCOUNTER — APPOINTMENT (OUTPATIENT)
Dept: RHEUMATOLOGY | Facility: CLINIC | Age: 69
End: 2024-07-16
Payer: MEDICARE

## 2024-07-16 VITALS
OXYGEN SATURATION: 99 % | WEIGHT: 110.4 LBS | BODY MASS INDEX: 18.85 KG/M2 | HEART RATE: 88 BPM | SYSTOLIC BLOOD PRESSURE: 112 MMHG | TEMPERATURE: 97.8 F | HEIGHT: 64 IN | DIASTOLIC BLOOD PRESSURE: 70 MMHG

## 2024-07-16 DIAGNOSIS — I10 PRIMARY HYPERTENSION: ICD-10-CM

## 2024-07-16 DIAGNOSIS — M05.79 RHEUMATOID ARTHRITIS INVOLVING MULTIPLE SITES WITH POSITIVE RHEUMATOID FACTOR (MULTI): ICD-10-CM

## 2024-07-16 DIAGNOSIS — L40.9 PSORIASIS: ICD-10-CM

## 2024-07-16 DIAGNOSIS — R93.1 AGATSTON CAC SCORE 100-199: Primary | ICD-10-CM

## 2024-07-16 DIAGNOSIS — K63.5 SERRATED POLYP OF COLON: ICD-10-CM

## 2024-07-16 PROBLEM — N17.9 ACUTE RENAL FAILURE (CMS-HCC): Status: ACTIVE | Noted: 2024-07-16

## 2024-07-16 PROBLEM — S72.90XA FRACTURE OF FEMUR (MULTI): Status: ACTIVE | Noted: 2023-09-28

## 2024-07-16 PROCEDURE — 1159F MED LIST DOCD IN RCRD: CPT | Performed by: INTERNAL MEDICINE

## 2024-07-16 PROCEDURE — 1123F ACP DISCUSS/DSCN MKR DOCD: CPT | Performed by: INTERNAL MEDICINE

## 2024-07-16 PROCEDURE — 3078F DIAST BP <80 MM HG: CPT | Performed by: INTERNAL MEDICINE

## 2024-07-16 PROCEDURE — 3074F SYST BP LT 130 MM HG: CPT | Performed by: INTERNAL MEDICINE

## 2024-07-16 PROCEDURE — 99214 OFFICE O/P EST MOD 30 MIN: CPT | Performed by: INTERNAL MEDICINE

## 2024-07-16 PROCEDURE — 3008F BODY MASS INDEX DOCD: CPT | Performed by: INTERNAL MEDICINE

## 2024-07-16 PROCEDURE — 1160F RVW MEDS BY RX/DR IN RCRD: CPT | Performed by: INTERNAL MEDICINE

## 2024-07-16 PROCEDURE — 1158F ADVNC CARE PLAN TLK DOCD: CPT | Performed by: INTERNAL MEDICINE

## 2024-07-16 PROCEDURE — 1170F FXNL STATUS ASSESSED: CPT | Performed by: INTERNAL MEDICINE

## 2024-07-16 PROCEDURE — 99497 ADVNCD CARE PLAN 30 MIN: CPT | Performed by: INTERNAL MEDICINE

## 2024-07-16 PROCEDURE — G0446 INTENS BEHAVE THER CARDIO DX: HCPCS | Performed by: INTERNAL MEDICINE

## 2024-07-16 PROCEDURE — G0444 DEPRESSION SCREEN ANNUAL: HCPCS | Performed by: INTERNAL MEDICINE

## 2024-07-16 PROCEDURE — G0439 PPPS, SUBSEQ VISIT: HCPCS | Performed by: INTERNAL MEDICINE

## 2024-07-16 RX ORDER — BETAMETHASONE DIPROPIONATE 0.5 MG/G
1 OINTMENT TOPICAL DAILY PRN
Qty: 15 G | Refills: 2 | Status: SHIPPED | OUTPATIENT
Start: 2024-07-16

## 2024-07-16 RX ORDER — LISINOPRIL 5 MG/1
5 TABLET ORAL DAILY
Qty: 90 TABLET | Refills: 1 | Status: SHIPPED | OUTPATIENT
Start: 2024-07-16

## 2024-07-16 RX ORDER — PREDNISONE 5 MG/1
5 TABLET ORAL DAILY
Qty: 90 TABLET | Refills: 1 | Status: SHIPPED | OUTPATIENT
Start: 2024-07-16

## 2024-07-16 RX ORDER — HYDROCHLOROTHIAZIDE 25 MG/1
25 TABLET ORAL DAILY
Qty: 90 TABLET | Refills: 1 | Status: SHIPPED | OUTPATIENT
Start: 2024-07-16

## 2024-07-16 RX ORDER — ATORVASTATIN CALCIUM 20 MG/1
20 TABLET, FILM COATED ORAL DAILY
Qty: 90 TABLET | Refills: 1 | Status: SHIPPED | OUTPATIENT
Start: 2024-07-16 | End: 2025-08-20

## 2024-07-16 ASSESSMENT — ACTIVITIES OF DAILY LIVING (ADL)
MANAGING_FINANCES: INDEPENDENT
BATHING: INDEPENDENT
DOING_HOUSEWORK: INDEPENDENT
DRESSING: INDEPENDENT
GROCERY_SHOPPING: INDEPENDENT
GROCERY_SHOPPING: INDEPENDENT
DOING_HOUSEWORK: INDEPENDENT
MANAGING_FINANCES: INDEPENDENT
TAKING_MEDICATION: INDEPENDENT
TAKING_MEDICATION: INDEPENDENT
BATHING: INDEPENDENT
DRESSING: INDEPENDENT

## 2024-07-16 NOTE — PATIENT INSTRUCTIONS
150 minutes of moderate exercise per week is recommended.  Mediterranean diet is considered heart healthy diet.  Start atorvastatin 20 mg daily.  Low dose chest CT for lung cancer screening scheduled 7/23/24.  Referred for colonoscopy.  Prolia will be due 12/13/23.  Follow-up in 3 months.

## 2024-07-16 NOTE — PROGRESS NOTES
Subjective   Patient ID: Tatiana Hagan is a 69 y.o. female who presents for Medicare wellness visit, as well as follow-up regarding overlap condition of rheumatoid arthritis and psoriatic arthritis, osteoporosis and CPPD arthropathy..    HPI 68 -year-old woman with history of overlap of rheumatoid arthritis and psoriatic arthritis , history of osteoporosis and CPPD arthropathy , who is here for follow-up visit.   She is also here for Medicare wellness visit.     The patient fell and fractured her left hip September 29, 2023.  Her leg is now feeling better and she is ambulating without assistance.    She lost significant weight while in the rehab center . She is slowly regaining weight she does state that her appetite is good..  Current weight is 110 pounds with BMI 18.95 (increase of 3 pounds since her last visit).    Prolia injection was given 6/13/24.  Next will be due 12/13/24.     She had Kenalog injection of her right knee with good relief  11/29/23.    She remains on Remicade 6 mg/kg (400 mg total) IV every 6 weeks, prednisone 5 mg daily. She tried to taper her prednisone to 2.5 mg daily a few months ago but was unable to tolerate the taper.     Her psoriasis is stable.     She did have a cardiac calcium score 3/20 that was 188. Dr. Mo prescribed atorvastatin 80 mg daily.  However, she tells me that she stopped the atorvastatin shortly after starting it because she thought it was affecting her cognition (I was not aware of this).  Lipids done June 2024, on no statin therapy, showed cholesterol 178 and LDL 81.  ASCVD risk score is 22.8%.     She continues to smoke 1 pack/day which she has done for 50 years. She is not currently interested in quitting smoking- this was discussed July 16, 2024.    Her car door bumped her right lateral calf about 1 month ago.  The area has a scab but appears to be healing.     She has a history of erosive esophagitis and esophageal strictures. Her last EGD and dilation was 3/19  with Dr. Cm.  Biopsies were negative for Reyes's esophagus.    She has a history of serrated colon polyps.  Her last colonoscopy was May 2018, she was due for follow-up May 2021.  This was discussed again April 9, 2024 but she did not want to follow-up at that time.  I have again recommended that she do follow-up colonoscopy, which she is now agreeable to .    She started Prolia 10/20.      She had an EGD and colonoscopy with Dr. Renee 3/19. Biopsies at that time did not show evidence of Reyes's esophagus. She did have serrated polyps- was told to do 3 year f/u.     EKG 9/23: normal.     Labs January 2024: CBC normal except white blood cell count 16.6, CMP except sodium 133, 25-hydroxy vitamin D 22, CRP less than 0.1  Labs June 2024: Cholesterol 178, HDL 65, LDL 81, triglycerides 161, ESR 12, CBC normal except white blood cell count 12.1, CMP normal except glucose 114     She received Reclast 10/13, 11/14, and 12/15.  DEXA 9/17: T score -3.2 femoral neck (decline of 4.8%), T score -3.0 total hip, T score -1.9 lumbar spine.   Received reclast 4/18 (because of loss of BMD 9/17), 7/19.    DEXA 10/19: T score -3.3 femoral neck (stable), T score -3.4 total hip (decline of 7.3%), T score -2.6 lumbar spine (decline of 8.5%)  DEXA December 2021: T score -2.8 left femoral neck (increased 13.2%), T score -3.2 left total hip (increased 4.3%), T score -2.3 lumbar spine (increased 3.6%)  DEXA 12/23: T-score -3.4 right total hip, T-score -2.9 right femoral neck, T-score -1.9 LS spine (increased 5.4%)     Health maintenance:   Mammograms have been refused.   Colonoscopy 5/18- needs 3 year follow-up because of serrated polyps. Discussed again 4/09/24, 7/16/24..   EGD 3/18: 3 cm hiatal hernia, erosive gastritis, ring in distal 1/3 of esophagus (dilated)..   EGD 3/19: biopsies negative for Reyes's esophagus.   Pneumovax 2005, 5/21   Prevnar 13 1/18   Moderna COVID-19 vaccine March 3, 2021, April 3, 2021. Received booster  "10/20/21 and 6/22. BV booster was given 10/06/22.    eye exam 1/16   QuantiFERON-TB Gold negative 8/18  T spot negative 5/21   Low dose chest CT (for lung cancer screening) 7/23-needs 1 year follow-up.     Medical problem list:   - RA (, )- previously failed MTX.   Took MTX from 0903-5988 (not certain why it was stopped).   Took Humira from 5131-5066.   Changed to remicade 2012 (because of worsening of psoriasis).      - psoriatic arthritis-    - Essential hypertension   - h/o CPPD arthropathy knee 1/09   - Cardiac calcium score 188 March 2020   - Osteoporosis- took Forteo [id number].    Started reclast 2010-had infusions October 2013, November 2014, December 2015, May 2018, July 2019.    Started Prolia 10/20 (due to loss of BMD and hip on DEXA October 2019.    - h/o bilateral sacral insufficiency fractures 2/21   - h/o left hip fracture 9/23   - h/o erosive esophagitis with recurrent strictures   - h/o serrated colon polyps     ROS:  General: Denies fevers or chills.  CV: Denies chest pain or palpitations.  Denies leg edema.  Lungs: Denies coughing or shortness of breath.  Skin: Denies rashes or nodules.  MS: Denies joint pain (except left hip) or joint swelling.            Objective   /70 (BP Location: Left arm, Patient Position: Sitting, BP Cuff Size: Small adult)   Pulse 88   Temp 36.6 °C (97.8 °F)   Ht 1.626 m (5' 4\")   Wt 50.1 kg (110 lb 6.4 oz)   SpO2 99%   BMI 18.95 kg/m²     Physical Exam  HEENT: PERRL, EOMI  Neck: Supple, no nodes.  CV: RRR, no MGR.  Lungs: Clear, no rales or wheezes.  Abdomen: Soft, nontender. No hepatosplenomegaly.  Extremities:  No cyanosis, clubbing, or edema.  MS: Swollen: 0         Tender: 0         Patient global: 4         Evaluator global: 4          CDAI: 8 (low disease activity)            Skin: Small plaques consistent with psoriasis on both elbows.  Has plaques in the pretibial area of left leg.      Assessment/Plan   Problem List Items Addressed This " Visit             ICD-10-CM    Rheumatoid arthritis (Multi) M06.9    Relevant Medications    predniSONE (Deltasone) 5 mg tablet    Agatston CAC score 100-199 - Primary R93.1    Relevant Medications    atorvastatin (Lipitor) 20 mg tablet     Other Visit Diagnoses         Codes    Psoriasis     L40.9    Relevant Medications    betamethasone dipropionate (Diprosone) 0.05 % ointment    Primary hypertension     I10    Relevant Medications    lisinopril 5 mg tablet    hydroCHLOROthiazide (HYDRODiuril) 25 mg tablet    Serrated polyp of colon     K63.5    Relevant Orders    Colonoscopy Screening; Average Risk Patient          1. RA/ psoriatic arthritis- overall stable .  She is currently low risk activity of ICI . She did not tolerate trying to reduce prednisone to 2.5 mg daily a few months ago , currently is back on 5 mg daily . Continue Remicade 6 mg/kg IV every 6 weeks.  Right knee was injected 11/23 with Kenalog.     2. Hypertension- at goal.      3. Osteoporosis- Pat fractures include left hip 9/23 and bilateral sacral insufficiency fractures 2/21.  Took forteo 2008- 2010.   Received Reclast October 2013, November 2014, December 2015, then again May 2018 (because of loss of BMD) and 7/19. DEXA 10/19 shows loss of BMD in hip..   Started prolia 10/20. Last dose was 6/13/24.. next dose is due 12/13/24.  DEXA done December 2023 shows T-score -2.9 right femoral neck, T-score -3.4 right total hip, T-score -1.9 lumbar spine.  Bone density  has increased 5.4% in the lumbar spine. Since right hip was used, cannot do comparison.  Next prolia is due 12/13/24. (Will be given in office since already approved- next will be given [place]).  Next DEXA will be due 12/25.      4. History of erosive gastritis- continue Nexium. She had a recent episode of food sticking 7/21-I recommend that she follow-up with Dr. Cm to consider repeat EGD in case she needs to be dilated again. She is also due for her 3-year follow-up colonoscopy  because of serrated polyps in March 2018.  This was discussed again in April 9, 2024 and 7/16/24 .  She was referred to GI for colonoscopy.     5. . Cardiac calcium score 188 March 2020-she stopped atorvastatin 80 mg because she thought it was affecting her cognition.  I recommend try a atorvastatin 20 mg daily.     6. Lung cancer screening-low-dose chest CT done 6/23 needs 1 year follow-up, which has been ordered and scheduled for 7/23/24.     8. Advanced Care planning-Greater than 16 minutes spent discussing. She has a living well. HPOA is  [place]. She is full code.     9. BMI 18.95-slowly improving since she left the rehab facility.    10.  Medicare wellness visit- done 7/16/24.    11. Depression screen- 5 minutes spent doing screen with PHQ-2 screen, which was negative.    12. Cardiovascular risk discussed (greater than 16 minutes) and, if needed, lifestyle modifications recommended, including nutritional choices, exercise, and elimination of habits contributing risk.  We agreed on a plan to reduce the current cardiovascular risk.  Aspirin use/disuse was discussed after reviewing updated guidelines. (Does not qualify)  ASCVD risk score is 16.1%.  Cardiac calcium score March 2020 was 188.  The patient stopped atorvastatin 80 mg because she thought it was affecting her cognition.  I recommend that she resume atorvastatin at a lower dose of 20 mg daily.  We discussed smoking cessation-she is not currently interested in quitting smoking.  I encouraged 150 minutes of moderate exercise per week.  Encouraged Mediterranean diet-her diet is currently fairly poor.  We reviewed this (essentially no fruits or vegetables, etc.).    Plan:  150 minutes of moderate exercise per week is recommended.  Mediterranean diet is considered heart healthy diet.  Start atorvastatin 20 mg daily.  Low dose chest CT for lung cancer screening scheduled 7/23/24.  Referred for colonoscopy.  Prolia will be due 12/13/23.  Follow-up in 3  months.

## 2024-07-17 ENCOUNTER — APPOINTMENT (OUTPATIENT)
Dept: INFUSION THERAPY | Facility: CLINIC | Age: 69
End: 2024-07-17
Payer: MEDICARE

## 2024-07-17 VITALS
OXYGEN SATURATION: 99 % | SYSTOLIC BLOOD PRESSURE: 115 MMHG | DIASTOLIC BLOOD PRESSURE: 69 MMHG | RESPIRATION RATE: 18 BRPM | HEART RATE: 92 BPM | TEMPERATURE: 98.2 F

## 2024-07-17 DIAGNOSIS — M05.762 RHEUMATOID ARTHRITIS INVOLVING BOTH KNEES WITH POSITIVE RHEUMATOID FACTOR (MULTI): ICD-10-CM

## 2024-07-17 DIAGNOSIS — M05.761 RHEUMATOID ARTHRITIS INVOLVING BOTH KNEES WITH POSITIVE RHEUMATOID FACTOR (MULTI): ICD-10-CM

## 2024-07-17 PROCEDURE — 96413 CHEMO IV INFUSION 1 HR: CPT | Performed by: REGISTERED NURSE

## 2024-07-17 RX ORDER — DIPHENHYDRAMINE HYDROCHLORIDE 50 MG/ML
50 INJECTION INTRAMUSCULAR; INTRAVENOUS AS NEEDED
OUTPATIENT
Start: 2024-08-28

## 2024-07-17 RX ORDER — EPINEPHRINE 0.3 MG/.3ML
0.3 INJECTION SUBCUTANEOUS EVERY 5 MIN PRN
OUTPATIENT
Start: 2024-08-28

## 2024-07-17 RX ORDER — FAMOTIDINE 10 MG/ML
20 INJECTION INTRAVENOUS ONCE AS NEEDED
OUTPATIENT
Start: 2024-08-28

## 2024-07-17 RX ORDER — ALBUTEROL SULFATE 0.83 MG/ML
3 SOLUTION RESPIRATORY (INHALATION) AS NEEDED
OUTPATIENT
Start: 2024-08-28

## 2024-07-17 ASSESSMENT — ENCOUNTER SYMPTOMS
GASTROINTESTINAL NEGATIVE: 1
CARDIOVASCULAR NEGATIVE: 1
CONSTITUTIONAL NEGATIVE: 1
MYALGIAS: 1
RESPIRATORY NEGATIVE: 1
ARTHRALGIAS: 1
NEUROLOGICAL NEGATIVE: 1

## 2024-07-17 NOTE — PATIENT INSTRUCTIONS
Today :We administered inFLIXimab (Remicade) 400 mg in sodium chloride 0.9% 250 mL IV.     For:   1. Rheumatoid arthritis involving both knees with positive rheumatoid factor (Multi)         Your next appointment is due in:  6 WEEKS        Please read the  Medication Guide that was given to you and reviewed during todays visit.     (Tell all doctors including dentists that you are taking this medication)     Go to the emergency room or call 911 if:  -You have signs of allergic reaction:   -Rash, hives, itching.   -Swollen, blistered, peeling skin.   -Swelling of face, lips, mouth, tongue or throat.   -Tightness of chest, trouble breathing, swallowing or talking     Call your doctor:  - If IV / injection site gets red, warm, swollen, itchy or leaks fluid or pus.     (Leave dressing on your IV site for at least 2 hours and keep area clean and dry  - If you get sick or have symptoms of infection or are not feeling well for any reason.    (Wash your hands often, stay away from people who are sick)  - If you have side effects from your medication that do not go away or are bothersome.     (Refer to the teaching your nurse gave you for side effects to call your doctor about)    - Common side effects may include:  stuffy nose, headache, feeling tired, muscle aches, upset stomach  - Before receiving any vaccines     - Call the [facility] at   If:  - You get sick, are on antibiotics, have had a recent vaccine, have surgery or dental work and your doctor wants your visit rescheduled.  - You need to cancel and reschedule your visit for any reason. Call at least 2 days before your visit if you need to cancel.   - Your insurance changes before your next visit.    (We will need to get approval from your new insurance. This can take up to two weeks.)     The [facility] is opened Monday thru Friday. We are closed on weekends and holidays.   Voice mail will take your call if the center is closed. If  you leave a message please allow 24 hours for a call back during weekdays. If you leave a message on a weekend/holiday, we will call you back the next business day.

## 2024-07-17 NOTE — PROGRESS NOTES
[facility]   Infusion Clinic Note   Date: 2024   Name: Tatiana Hagan  : 1955   MRN: [id number]         Reason for Visit: OP Infusion (PATIENT HERE FOR Q 6 WEEK REMICADE 400 MG INFUSION)         Today: We administered inFLIXimab (Remicade) 400 mg in sodium chloride 0.9% 250 mL IV.       Visit Type: INFUSION       Ordered By: Shauna Bolanos MD       Accompanied by:Self      Diagnosis: Rheumatoid arthritis involving both knees with positive rheumatoid factor (Multi)       Allergies:   Allergies as of 2024 - Reviewed 2024   Allergen Reaction Noted    Nsaids (non-steroidal anti-inflammatory drug) Other 2023    Aspirin Other 2023    Penicillins Rash 2023    Sulfa (sulfonamide antibiotics) Nausea And Vomiting 2020         Current Medications has a current medication list which includes the following prescription(s): acetaminophen, atorvastatin, betamethasone dipropionate, denosumab, gabapentin, hydrochlorothiazide, infliximab, lisinopril, omeprazole, and prednisone, and the following Facility-Administered Medications: denosumab and denosumab.       Vitals:   There were no vitals filed for this visit.          Infusion Pre-procedure Checklist:   - Allergies reviewed: yes   - Medications reviewed: yes       - Previous reaction to current treatment: no      Assess patient for the concerns below. Document provider notification as appropriate.  - Active or recent infection with/without current antibiotic use: no  - Recent or planned invasive dental work: no  - Recent or planned surgeries: no  - Recently received or plans to receive vaccinations: no  - Has treatment related toxicities: no  - Is pregnant:  n/a      Pain: 5 GENERALIZED JOINTS   - Is the pain different from normal: no   - Is the pain tolerable: yes   - Is your Doctor aware:  yes      Labs:  REVIEWED         Fall Risk Screening: Cheng Fall Risk  History of Falling, Immediate or Within  "3 Months: No  Secondary Diagnosis: No  Ambulatory Aid: Walks without aid/bedrest/nurse assist  Intravenous Therapy/Heparin Lock: No  Gait/Transferring: Normal/bedrest/immobile  Mental Status: Oriented to own ability  Anand Fall Risk Score: 0       Review Of Systems:  Review of Systems   Constitutional: Negative.    HENT:  Negative.     Respiratory: Negative.     Cardiovascular: Negative.    Gastrointestinal: Negative.    Musculoskeletal:  Positive for arthralgias and myalgias.   Skin: Negative.    Neurological: Negative.          Infusion Readiness:   - Assessment Concerns Related to Infusion: No  - Provider notified: n/a      Document Below Only If Indicated:   New Patient Education:    N/A (returning patient for continuation of therapy. Ongoing education provided as needed.)        Treatment Conditions & Drug Specific Questions:    InFLIXimab  (AVSOLA, INFLECTRA, REMICADE, RENFLEXIS)    (Unless otherwise specified on patient specific therapy plan):     TREATMENT CONDITIONS:  Unless otherwise specified on patient specific therapy plan HOLD and notify Provider prior to proceeding with today's infusion if patient with:  o Positive T-Spot  o Reactive Hep B sAg and/or Hep B Core Antibody    Lab Results   Component Value Date    TBSIN Negative 06/13/2022    QFG NEGATIVE 08/28/2018      No results found for: \"HAGCN\", \"HEPBSURABI\", \"HBSAG\", \"XHAGF\", \"HEPBSAG\", \"EXTHEPBSAG\", \"NONUHSWGH\"   No results found for: \"NONUHFIRE\", \"NONUHSWGH\", \"NONUHFISH\", \"EXTHEPBSAG\"  No results found for: \"HBCTI\", \"HEPBCAB\"  No results found for: \"HEPATOT\", \"HEPAIGM\", \"HEPBCIGM\", \"HEPBCAB\", \"HBEAG\", \"HEPCAB\"     Labs reviewed and patient meets treatment conditions? Yes    DRUG SPECIFIC QUESTIONS:    Any new or worsening signs / symptoms of heart failure which may include things like worsening shortness of breath, swelling, fatigue? No   (If yes notify provider before proceeding with today's infusion. New onset or worsening HF have been reported " with infliximab)    REMINDER:   WEIGHT BASED DRUG     Recommended Vitals/Observation:  Vitals:     Induction: Obtain vital signs every 30 minutes; at end of observation period and as needed.     Maintenance: Obtain vital signs at start and end of infusions  Observation:     Induction: Patient is monitored for 30 minutes post-infusion     Maintenance: No observation.        Weight Based Drug Calculations:    WEIGHT BASED DRUGS: NOT APPLICABLE / FLAT DOSE          Initiated By: Bethanie Lazaro RN          All questions and concerns were addressed at time of visit. Patient was not independently evaluated by the Nurse Practitioner on site at [facility].    07/17/24 at 1:12 PM - Naima Suarez, ROXIE-CNP

## 2024-07-18 ENCOUNTER — TELEPHONE (OUTPATIENT)
Dept: GASTROENTEROLOGY | Facility: EXTERNAL LOCATION | Age: 69
End: 2024-07-18
Payer: MEDICARE

## 2024-07-23 ENCOUNTER — HOSPITAL ENCOUNTER (OUTPATIENT)
Dept: RADIOLOGY | Facility: CLINIC | Age: 69
Discharge: HOME | End: 2024-07-23
Payer: MEDICARE

## 2024-07-23 DIAGNOSIS — Z72.0 TOBACCO ABUSE DISORDER: ICD-10-CM

## 2024-07-23 DIAGNOSIS — F17.218 NICOTINE DEPENDENCE, CIGARETTES, WITH OTHER NICOTINE-INDUCED DISORDERS: ICD-10-CM

## 2024-07-23 PROCEDURE — 71271 CT THORAX LUNG CANCER SCR C-: CPT

## 2024-08-22 ENCOUNTER — APPOINTMENT (OUTPATIENT)
Dept: RADIOLOGY | Facility: HOSPITAL | Age: 69
End: 2024-08-22
Payer: MEDICARE

## 2024-08-22 ENCOUNTER — HOSPITAL ENCOUNTER (EMERGENCY)
Facility: HOSPITAL | Age: 69
Discharge: HOME | End: 2024-08-22
Attending: EMERGENCY MEDICINE
Payer: MEDICARE

## 2024-08-22 VITALS
HEIGHT: 64 IN | RESPIRATION RATE: 18 BRPM | DIASTOLIC BLOOD PRESSURE: 77 MMHG | BODY MASS INDEX: 18.78 KG/M2 | TEMPERATURE: 96.6 F | SYSTOLIC BLOOD PRESSURE: 139 MMHG | OXYGEN SATURATION: 100 % | HEART RATE: 93 BPM | WEIGHT: 110 LBS

## 2024-08-22 DIAGNOSIS — M25.511 ACUTE PAIN OF RIGHT SHOULDER: Primary | ICD-10-CM

## 2024-08-22 PROCEDURE — 2500000001 HC RX 250 WO HCPCS SELF ADMINISTERED DRUGS (ALT 637 FOR MEDICARE OP): Performed by: EMERGENCY MEDICINE

## 2024-08-22 PROCEDURE — 73030 X-RAY EXAM OF SHOULDER: CPT | Mod: RT,FY

## 2024-08-22 PROCEDURE — 99284 EMERGENCY DEPT VISIT MOD MDM: CPT | Performed by: EMERGENCY MEDICINE

## 2024-08-22 PROCEDURE — 73030 X-RAY EXAM OF SHOULDER: CPT | Mod: RIGHT SIDE | Performed by: RADIOLOGY

## 2024-08-22 PROCEDURE — 99283 EMERGENCY DEPT VISIT LOW MDM: CPT

## 2024-08-22 PROCEDURE — 2500000001 HC RX 250 WO HCPCS SELF ADMINISTERED DRUGS (ALT 637 FOR MEDICARE OP)

## 2024-08-22 RX ORDER — HYDROCODONE BITARTRATE AND ACETAMINOPHEN 5; 325 MG/1; MG/1
1 TABLET ORAL EVERY 8 HOURS PRN
Qty: 9 TABLET | Refills: 0 | Status: SHIPPED | OUTPATIENT
Start: 2024-08-22

## 2024-08-22 RX ORDER — HYDROCODONE BITARTRATE AND ACETAMINOPHEN 5; 325 MG/1; MG/1
1 TABLET ORAL ONCE
Status: COMPLETED | OUTPATIENT
Start: 2024-08-22 | End: 2024-08-22

## 2024-08-22 RX ORDER — METHOCARBAMOL 500 MG/1
500 TABLET, FILM COATED ORAL ONCE
Status: COMPLETED | OUTPATIENT
Start: 2024-08-22 | End: 2024-08-22

## 2024-08-22 RX ADMIN — HYDROCODONE BITARTRATE AND ACETAMINOPHEN 1 TABLET: 5; 325 TABLET ORAL at 22:28

## 2024-08-22 RX ADMIN — METHOCARBAMOL TABLETS 500 MG: 500 TABLET, COATED ORAL at 21:08

## 2024-08-22 ASSESSMENT — LIFESTYLE VARIABLES
EVER FELT BAD OR GUILTY ABOUT YOUR DRINKING: NO
EVER HAD A DRINK FIRST THING IN THE MORNING TO STEADY YOUR NERVES TO GET RID OF A HANGOVER: NO
HAVE YOU EVER FELT YOU SHOULD CUT DOWN ON YOUR DRINKING: NO
TOTAL SCORE: 0
HAVE PEOPLE ANNOYED YOU BY CRITICIZING YOUR DRINKING: NO

## 2024-08-22 ASSESSMENT — PAIN DESCRIPTION - FREQUENCY: FREQUENCY: CONSTANT/CONTINUOUS

## 2024-08-22 ASSESSMENT — PAIN DESCRIPTION - LOCATION: LOCATION: SHOULDER

## 2024-08-22 ASSESSMENT — PAIN SCALES - GENERAL
PAINLEVEL_OUTOF10: 10 - WORST POSSIBLE PAIN

## 2024-08-22 ASSESSMENT — COLUMBIA-SUICIDE SEVERITY RATING SCALE - C-SSRS
2. HAVE YOU ACTUALLY HAD ANY THOUGHTS OF KILLING YOURSELF?: NO
6. HAVE YOU EVER DONE ANYTHING, STARTED TO DO ANYTHING, OR PREPARED TO DO ANYTHING TO END YOUR LIFE?: NO
1. IN THE PAST MONTH, HAVE YOU WISHED YOU WERE DEAD OR WISHED YOU COULD GO TO SLEEP AND NOT WAKE UP?: NO

## 2024-08-22 ASSESSMENT — PAIN DESCRIPTION - ORIENTATION
ORIENTATION: RIGHT
ORIENTATION: RIGHT

## 2024-08-22 ASSESSMENT — PAIN DESCRIPTION - DESCRIPTORS: DESCRIPTORS: SHARP;SHOOTING;THROBBING

## 2024-08-22 ASSESSMENT — PAIN - FUNCTIONAL ASSESSMENT: PAIN_FUNCTIONAL_ASSESSMENT: 0-10

## 2024-08-22 ASSESSMENT — PAIN DESCRIPTION - PAIN TYPE: TYPE: ACUTE PAIN

## 2024-08-23 ENCOUNTER — APPOINTMENT (OUTPATIENT)
Dept: RADIOLOGY | Facility: CLINIC | Age: 69
End: 2024-08-23
Payer: MEDICARE

## 2024-08-23 NOTE — DISCHARGE INSTRUCTIONS
Seek immediate medical attention if you develop: increasing pain, numbness, tingling, weakness, loss of motion in your shoulder or arm, your arm becomes pale or cold, or you develop any new or worsening symptoms.

## 2024-08-23 NOTE — ED PROVIDER NOTES
Emergency Department Provider Note        History of Present Illness     History provided by: Patient  Limitations to History: None  External Records Reviewed with Brief Summary: None    HPI:  Tatiana Hagan is a 69 y.o. female with hypertension, arthritis who presents to the emergency department for right shoulder pain.  She was lifting her adult son yesterday evening.  She did not have any pain then, but woke up this morning with pain in her posterior shoulder that worsens with any movement of her shoulder.  She denies any elbow pain, numbness or weakness of that extremity.  She denies any other symptoms such as chest pain, shortness breath, dizziness, nausea vomiting or abdominal pain.  She has been taking Tylenol for the pain but has not been helping very much.  Her  drove her here to the ED today.    Physical Exam   Triage vitals:  T 35.9 °C (96.6 °F)  HR 89  /62  RR 16  O2 99 % None (Room air)    Physical Exam  Vitals and nursing note reviewed.   Constitutional:       General: She is not in acute distress.     Appearance: She is well-developed.   HENT:      Head: Normocephalic and atraumatic.      Right Ear: External ear normal.      Left Ear: External ear normal.      Nose: Nose normal.      Mouth/Throat:      Mouth: Mucous membranes are moist.   Eyes:      General: No scleral icterus.     Extraocular Movements: Extraocular movements intact.      Conjunctiva/sclera: Conjunctivae normal.      Pupils: Pupils are equal, round, and reactive to light.   Cardiovascular:      Rate and Rhythm: Normal rate and regular rhythm.      Pulses: Normal pulses.      Heart sounds: No murmur heard.  Pulmonary:      Effort: Pulmonary effort is normal. No respiratory distress.      Breath sounds: Normal breath sounds.   Abdominal:      Palpations: Abdomen is soft.      Tenderness: There is no abdominal tenderness.   Musculoskeletal:         General: Tenderness present. No swelling.      Cervical back: Neck supple.       Comments: Range of motion of the right shoulder is limited due to pain.  There is tenderness to palpation of the posterior right shoulder.   Skin:     General: Skin is warm and dry.   Neurological:      General: No focal deficit present.      Mental Status: She is alert.      Sensory: No sensory deficit.      Motor: No weakness.   Psychiatric:         Mood and Affect: Mood normal.          Medical Decision Making & ED Course   Medical Decision Makin y.o. female who presents to the emergency department right shoulder pain.  On arrival, she is afebrile and hemodynamically stable.  She has tenderness to palpation of the right posterior shoulder, which is also reproducible with range of motion.  Range of motion is limited due to the pain.  She is neurovascularly intact.  She has no other symptoms.  Symptoms are consistent with a muscle strain or possible rotator cuff tear.  We obtain x-ray imaging to evaluate for any other injuries such as an avulsion fracture, dislocation, or other fracture.  Patient is given Robaxin for her pain.    X-rays are unremarkable.  Findings were discussed with the patient.  She feels comfortable with discharge home.  She is given a prescription for Norco and orthopedic follow-up.  Home care and return instructions discussed. Patient expressed understanding and agreement. Patient discharged in stable condition.    Kyle Engel DO, PGY-4  Emergency Medicine Resident  ----      Differential diagnoses considered include but are not limited to: Avulsion fracture, fracture, rotator cuff tear, muscle strain     Social Determinants of Health which Significantly Impact Care: None identified     EKG Independent Interpretation: EKG not obtained    Independent Result Review and Interpretation: Relevant laboratory and radiographic results were reviewed and independently interpreted by myself.  As necessary, they are commented on in the ED Course.    Chronic conditions affecting the patient's  care: As documented above in [facility]    The patient was discussed with the following consultants/services: None    Care Considerations: As documented above in [facility]    ED Course:  Diagnoses as of 08/23/24 1050   Acute pain of right shoulder     Disposition   As a result of the work-up, the patient was discharged home.  she was informed of her diagnosis and instructed to come back with any concerns or worsening of condition.  she and was agreeable to the plan as discussed above.  she was given the opportunity to ask questions.  All of the patient's questions were answered.    Procedures   Procedures    Patient seen and discussed with ED attending physician.    Kyle Engel DO  Emergency Medicine     Kyle Engel DO  Resident  08/23/24 1050

## 2024-08-27 ENCOUNTER — APPOINTMENT (OUTPATIENT)
Dept: ORTHOPEDIC SURGERY | Facility: CLINIC | Age: 69
End: 2024-08-27
Payer: MEDICARE

## 2024-08-28 ENCOUNTER — APPOINTMENT (OUTPATIENT)
Dept: INFUSION THERAPY | Facility: CLINIC | Age: 69
End: 2024-08-28
Payer: MEDICARE

## 2024-09-15 ENCOUNTER — APPOINTMENT (OUTPATIENT)
Dept: RADIOLOGY | Facility: HOSPITAL | Age: 69
End: 2024-09-15
Payer: MEDICARE

## 2024-09-15 ENCOUNTER — HOSPITAL ENCOUNTER (EMERGENCY)
Facility: HOSPITAL | Age: 69
Discharge: HOME | End: 2024-09-15
Attending: EMERGENCY MEDICINE
Payer: MEDICARE

## 2024-09-15 VITALS
HEIGHT: 64 IN | SYSTOLIC BLOOD PRESSURE: 146 MMHG | BODY MASS INDEX: 18.78 KG/M2 | OXYGEN SATURATION: 98 % | HEART RATE: 97 BPM | WEIGHT: 110 LBS | RESPIRATION RATE: 18 BRPM | TEMPERATURE: 97.9 F | DIASTOLIC BLOOD PRESSURE: 68 MMHG
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
## 2024-09-15 DIAGNOSIS — S42.035A CLOSED NONDISPLACED FRACTURE OF ACROMIAL END OF LEFT CLAVICLE, INITIAL ENCOUNTER: ICD-10-CM

## 2024-09-15 LAB — GLUCOSE BLD MANUAL STRIP-MCNC: 83 MG/DL (ref 74–99)

## 2024-09-15 PROCEDURE — 73030 X-RAY EXAM OF SHOULDER: CPT | Mod: LT

## 2024-09-15 PROCEDURE — 82947 ASSAY GLUCOSE BLOOD QUANT: CPT

## 2024-09-15 PROCEDURE — 73030 X-RAY EXAM OF SHOULDER: CPT | Mod: LEFT SIDE | Performed by: RADIOLOGY

## 2024-09-15 PROCEDURE — 2500000004 HC RX 250 GENERAL PHARMACY W/ HCPCS (ALT 636 FOR OP/ED)

## 2024-09-15 PROCEDURE — 71101 X-RAY EXAM UNILAT RIBS/CHEST: CPT | Mod: LEFT SIDE | Performed by: RADIOLOGY

## 2024-09-15 PROCEDURE — 96372 THER/PROPH/DIAG INJ SC/IM: CPT

## 2024-09-15 PROCEDURE — 99284 EMERGENCY DEPT VISIT MOD MDM: CPT

## 2024-09-15 PROCEDURE — 71101 X-RAY EXAM UNILAT RIBS/CHEST: CPT | Mod: LT

## 2024-09-15 RX ORDER — OXYCODONE HYDROCHLORIDE 5 MG/1
5 TABLET ORAL EVERY 6 HOURS PRN
Qty: 9 TABLET | Refills: 0 | Status: SHIPPED | OUTPATIENT
Start: 2024-09-15 | End: 2024-09-18

## 2024-09-15 ASSESSMENT — PAIN SCALES - GENERAL
PAINLEVEL_OUTOF10: 8
PAINLEVEL_OUTOF10: 10 - WORST POSSIBLE PAIN
PAINLEVEL_OUTOF10: 6
PAINLEVEL_OUTOF10: 10 - WORST POSSIBLE PAIN

## 2024-09-15 ASSESSMENT — PAIN DESCRIPTION - ORIENTATION: ORIENTATION: LEFT

## 2024-09-15 ASSESSMENT — COLUMBIA-SUICIDE SEVERITY RATING SCALE - C-SSRS
6. HAVE YOU EVER DONE ANYTHING, STARTED TO DO ANYTHING, OR PREPARED TO DO ANYTHING TO END YOUR LIFE?: NO
1. IN THE PAST MONTH, HAVE YOU WISHED YOU WERE DEAD OR WISHED YOU COULD GO TO SLEEP AND NOT WAKE UP?: NO
2. HAVE YOU ACTUALLY HAD ANY THOUGHTS OF KILLING YOURSELF?: NO

## 2024-09-15 ASSESSMENT — PAIN DESCRIPTION - FREQUENCY: FREQUENCY: CONSTANT/CONTINUOUS

## 2024-09-15 ASSESSMENT — PAIN DESCRIPTION - LOCATION
LOCATION: SHOULDER

## 2024-09-15 ASSESSMENT — PAIN - FUNCTIONAL ASSESSMENT
PAIN_FUNCTIONAL_ASSESSMENT: 0-10
PAIN_FUNCTIONAL_ASSESSMENT: 0-10

## 2024-09-15 ASSESSMENT — PAIN DESCRIPTION - PAIN TYPE: TYPE: ACUTE PAIN

## 2024-09-15 ASSESSMENT — PAIN DESCRIPTION - PROGRESSION: CLINICAL_PROGRESSION: NOT CHANGED

## 2024-09-16 NOTE — DISCHARGE INSTRUCTIONS
Please follow-up with orthopedics in regards to your clavicle fracture.  Use the sling at all times.  You can take it off for showers.  You are being prescribed pain medications for severe pain.  Otherwise use Tylenol to help with pain and discomfort.

## 2024-09-16 NOTE — ED PROVIDER NOTES
EMERGENCY DEPARTMENT ENCOUNTER      Pt Name: Tatiana Hagan  MRN: [id number]  Birthdate 1955  Date of evaluation: 9/15/2024    HISTORY OF PRESENT ILLNESS    Tatiana Hagan is an 69 y.o. female with history including osteoporosis, GERD, Reyes's esophagus, rheumatoid arthritis presenting to the emergency department for pain status post mechanical fall.  Patient was helping her child get into bed when she tripped over his wheelchair landing on the left side of her body.  She did not hit her head or lose consciousness.  Patient is not on any thinners.  Patient hit her left shoulder and the left side of her rib cage on the ground.  Patient has pain to the left shoulder.  She denies any headache, neck pain, shortness of breath.  Patient was not lightheaded or having chest pain prior to the fall.  No other injuries.  Patient was able to ambulate.      PAST MEDICAL HISTORY     Past Medical History:   Diagnosis Date    Age-related osteoporosis without current pathological fracture     Osteoporosis    Reyes's esophagus without dysplasia 11/16/2019    Reyes's esophagus without dysplasia    Candidal esophagitis (Multi) 09/13/2018    Esophageal candidiasis    Encounter for gynecological examination (general) (routine) without abnormal findings     Encounter for gynecological examination without abnormal finding    Nonspecific reaction to tuberculin skin test without active tuberculosis     Nonspecific reaction to tuberculin skin test without active tuberculosis    Other conditions influencing health status     X-Ray    Other conditions influencing health status     Visit For: Routine Eye Exam    Personal history of other diseases of the musculoskeletal system and connective tissue     History of pseudogout    Rheumatoid arthritis, unspecified (Multi) 09/11/2018    Rheumatoid arthritis       SURGICAL HISTORY       Past Surgical History:   Procedure Laterality Date    CARPAL TUNNEL RELEASE  09/24/2014    Neuroplasty  Decompression Median Nerve At Carpal Tunnel    HIP FRACTURE SURGERY      TONSILLECTOMY  09/24/2014    Tonsillectomy       CURRENT MEDICATIONS       Discharge Medication List as of 9/15/2024 11:00 PM        CONTINUE these medications which have NOT CHANGED    Details   acetaminophen (Tylenol 8 HOUR) 650 mg ER tablet Take 2 tablets (1,300 mg) by mouth every 8 hours if needed for mild pain (1 - 3)., Historical Med      atorvastatin (Lipitor) 20 mg tablet Take 1 tablet (20 mg) by mouth once daily., Starting Tue 7/16/2024, Until Wed 8/20/2025, Normal      betamethasone dipropionate (Diprosone) 0.05 % ointment Apply 1 Application topically once daily as needed for rash., Starting Tue 7/16/2024, Normal      denosumab (Prolia) 60 mg/mL syringe Inject 1 mL (60 mg total) under the skin every 6 months., Historical Med      gabapentin (Neurontin) 100 mg capsule Take 1 capsule (100 mg) by mouth 3 times a day as needed (pain)., Historical Med      hydroCHLOROthiazide (HYDRODiuril) 25 mg tablet Take 1 tablet (25 mg) by mouth once daily., Starting Tue 7/16/2024, Normal      HYDROcodone-acetaminophen (Norco) 5-325 mg tablet Take 1 tablet by mouth every 8 hours if needed for severe pain (7 - 10) for up to 9 doses., Starting Thu 8/22/2024, Normal      inFLIXimab (Remicade) 100 mg injection Infuse 6mg/kg (400mg dose) IV once every 6 weeks, Normal      lisinopril 5 mg tablet Take 1 tablet (5 mg) by mouth once daily., Starting Tue 7/16/2024, Normal      omeprazole (PriLOSEC) 20 mg DR capsule Take 1 capsule (20 mg) by mouth once daily in the morning. Take before meals. Do not crush or chew., Historical Med      predniSONE (Deltasone) 5 mg tablet Take 1 tablet (5 mg) by mouth once daily., Starting Tue 7/16/2024, Normal             ALLERGIES     Nsaids (non-steroidal anti-inflammatory drug), Aspirin, Penicillins, and Sulfa (sulfonamide antibiotics)    FAMILY HISTORY       Family History   Problem Relation Name Age of Onset    Atrial  fibrillation Mother      Hypertension Mother      Liver cancer Mother      Ovarian cancer Mother      Diabetes Father      Cancer Father      Cancer Sister          SOCIAL HISTORY       Social History     Socioeconomic History    Marital status:    Tobacco Use    Smoking status: Every Day     Current packs/day: 1.00     Average packs/day: 1 pack/day for 50.0 years (50.0 ttl pk-yrs)     Types: Cigarettes    Smokeless tobacco: Never   Substance and Sexual Activity    Alcohol use: Not Currently    Drug use: Not Currently       PHYSICAL EXAM       ED Triage Vitals [09/15/24 2035]   Temperature Heart Rate Respirations BP   36.6 °C (97.9 °F) 94 18 151/77      Pulse Ox Temp Source Heart Rate Source Patient Position   100 % Temporal Monitor Sitting      BP Location FiO2 (%)     Right arm --       Physical Exam  Vitals and nursing note reviewed.   Constitutional:       General: She is not in acute distress.     Appearance: She is well-developed.   HENT:      Head: Normocephalic and atraumatic.   Eyes:      Conjunctiva/sclera: Conjunctivae normal.   Cardiovascular:      Rate and Rhythm: Normal rate and regular rhythm.      Heart sounds: No murmur heard.  Pulmonary:      Effort: Pulmonary effort is normal. No respiratory distress.      Breath sounds: Normal breath sounds.   Chest:      Chest wall: Tenderness (Left lateral chest wall) present.   Abdominal:      Palpations: Abdomen is soft.      Tenderness: There is no abdominal tenderness.   Musculoskeletal:      Right upper arm: Normal.      Left upper arm: Tenderness and bony tenderness present. No deformity.      Right elbow: Normal.      Left elbow: Normal.   Skin:     General: Skin is warm and dry.      Capillary Refill: Capillary refill takes less than 2 seconds.   Neurological:      Mental Status: She is alert.   Psychiatric:         Mood and Affect: Mood normal.          DIAGNOSTIC RESULTS     LABS:  Labs Reviewed   POCT GLUCOSE - Normal       Result Value     POCT Glucose 83         All other labs were within normal range or not returned as of this dictation.    Imaging  XR shoulder left 2+ views   Final Result   Acute, mildly displaced distal left clavicle fracture.             MACRO:   None        Signed by: Smita Rees 9/15/2024 10:31 PM   Dictation workstation:   [id number]      XR ribs 2 views left w chest pa or ap   Final Result   No acute cardiopulmonary process.   No definite acute left rib fracture. Several chronic appearing left   rib fracture deformities are noted. Mildly displaced distal clavicle   fracture.        MACRO:   None        Signed by: Smita Rees 9/15/2024 10:35 PM   Dictation workstation:   [id number]           Procedures  Procedures     EMERGENCY DEPARTMENT COURSE/MDM:   Medical Decision Making  Tatiana Hagan is an 69 y.o. female with history including osteoporosis, GERD, Reyes's esophagus, rheumatoid arthritis presenting to the emergency department for pain status post mechanical fall.  On physical exam patient refusing to move her left upper extremity at the shoulder joint she has full range of motion at the elbow and wrist and hand.  There is tenderness to palpation over the olecranon region and possible humeral neck.  Patient given IM 0.5 mg Dilaudid for pain control and x-ray imaging of the shoulder ordered.  Patient also has point tenderness on the left lateral rib cage.  X-ray to rule out rib fractures ordered.  Considered CT imaging but patient had no head strike no loss of consciousness and is not on any thinners.  Low concern for intracranial pathology or cervical pathology.      X-ray imaging reviewed interpreted independently. X-ray of the shoulder shows an acute mildly displaced distal left clavicle fracture.  Chest x-ray shows no rib fractures and no acute cardiopulmonary process.  There are chronic appearing left rib fractures on chest x-ray.    Results were discussed with patient.  Patient was placed in a sling.  She is  given a prescription for oxycodone outpatient with follow-up with orthopedics in regards to the clavicular fracture.        Diagnoses as of 09/16/24 2139   Fall, initial encounter   Closed nondisplaced fracture of acromial end of left clavicle, initial encounter        External records reviewed: recent inpatient, clinic, and prior ED notes  Labs and Diagnostic imaging independently reviewed/interpreted by me.    Patient plan, care, lab results and imaging were all discussed with attending.    ED Medications administered this visit:    Medications   HYDROmorphone (Dilaudid) injection 0.5 mg (0.5 mg intramuscular Given 9/15/24 2132)     New Prescriptions from this visit:    Discharge Medication List as of 9/15/2024 11:00 PM        START taking these medications    Details   oxyCODONE (Roxicodone) 5 mg immediate release tablet Take 1 tablet (5 mg) by mouth every 6 hours if needed for severe pain (7 - 10) for up to 3 days., Starting Sun 9/15/2024, Until Wed 9/18/2024 at 2359, Normal             (Please note that portions of this note were completed with a voice recognition program.  Efforts were made to edit the dictations but occasionally words are mis-transcribed.)     Orin Urbina DO  Resident  09/16/24 2139

## 2024-09-17 ENCOUNTER — APPOINTMENT (OUTPATIENT)
Dept: ORTHOPEDIC SURGERY | Facility: CLINIC | Age: 69
End: 2024-09-17
Payer: MEDICARE

## 2024-09-17 NOTE — PROGRESS NOTES
No chief complaint on file.    History of Present Illness:  Tatiana Hagan is a 69 y.o. female presenting to clinic with complaints of ***.    Imaging:  X-rays *** shoulder: ***     Assessment:   ***    Plan:  We reviewed the role of imaging, physical therapy, injections and the time frame to healing and correlation with outcome.  ***  NSAID: ***.  GI side effects and medical risks discussed  Ice: 60 minutes on and off  Exercise home program: Medically directed Shoulder therapy / Handout given  Follow-up in ***.      Physical Exam:  Well-nourished, well-developed. No acute distress. Alert and oriented x3. Responds appropriately to questioning. Good mood. Normal affect.  Physical Exam  *** Shoulder:  Strength / ROM: ***  Speeds test + impingement  Positive Neer and Hawkin´s test  Neurovascular exam normal distally     Review of Systems:  GENERAL: Negative for malaise, significant weight loss, fever  MUSCULOSKELETAL: See HPI  NEURO:  Negative     Past Medical History:   Diagnosis Date    Age-related osteoporosis without current pathological fracture     Osteoporosis    Reyes's esophagus without dysplasia 11/16/2019    Reyes's esophagus without dysplasia    Candidal esophagitis (Multi) 09/13/2018    Esophageal candidiasis    Encounter for gynecological examination (general) (routine) without abnormal findings     Encounter for gynecological examination without abnormal finding    Nonspecific reaction to tuberculin skin test without active tuberculosis     Nonspecific reaction to tuberculin skin test without active tuberculosis    Other conditions influencing health status     X-Ray    Other conditions influencing health status     Visit For: Routine Eye Exam    Personal history of other diseases of the musculoskeletal system and connective tissue     History of pseudogout    Rheumatoid arthritis, unspecified (Multi) 09/11/2018    Rheumatoid arthritis       Medication Documentation Review Audit       Reviewed by  Julisa Cook RN (Registered Nurse) on 09/15/24 at 2059      Medication Order Taking? Sig Documenting Provider Last Dose Status   acetaminophen (Tylenol 8 HOUR) 650 mg ER tablet [id number]  Take 2 tablets (1,300 mg) by mouth every 8 hours if needed for mild pain (1 - 3). Historical Provider, MD  Active   atorvastatin (Lipitor) 20 mg tablet [id number]  Take 1 tablet (20 mg) by mouth once daily. Shauna Bolanos MD  Active   betamethasone dipropionate (Diprosone) 0.05 % ointment [id number]  Apply 1 Application topically once daily as needed for rash. Shauna Bolanos MD  Active   denosumab (Prolia) 60 mg/mL syringe [id number]  Inject 1 mL (60 mg total) under the skin every 6 months. Historical Provider, MD  Active   denosumab (Prolia) injection 60 mg [id number]   Shauna Bolanos MD  Active   denosumab (Prolia) injection 60 mg [id number]   Shauna Bolanos MD  Active   gabapentin (Neurontin) 100 mg capsule [id number]  Take 1 capsule (100 mg) by mouth 3 times a day as needed (pain). Historical Provider, MD  Active   hydroCHLOROthiazide (HYDRODiuril) 25 mg tablet [id number]  Take 1 tablet (25 mg) by mouth once daily. Shauna Bolanos MD  Active   HYDROcodone-acetaminophen (Norco) 5-325 mg tablet [id number]  Take 1 tablet by mouth every 8 hours if needed for severe pain (7 - 10) for up to 9 doses. Josesito Regan,   Active   inFLIXimab (Remicade) 100 mg injection [id number]  Infuse 6mg/kg (400mg dose) IV once every 6 weeks   Patient taking differently: Infuse 5 mg/kg into a venous catheter 1 time.  Infuse 6mg/kg (400mg dose) IV once every 6 weeks    Shauna Bolanos MD  Active   lisinopril 5 mg tablet [id number]  Take 1 tablet (5 mg) by mouth once daily. Shauna Bolanos MD  Active   omeprazole (PriLOSEC) 20 mg DR capsule [id number]  Take 1 capsule (20 mg) by mouth once daily in the morning. Take before meals. Do not crush or chew. Historical Provider, MD  Active   predniSONE  (Deltasone) 5 mg tablet [id number]  Take 1 tablet (5 mg) by mouth once daily. Shauna Bolanos MD  Active                    Allergies   Allergen Reactions    Nsaids (Non-Steroidal Anti-Inflammatory Drug) Other     vomiting    Aspirin Other     vomiting    Penicillins Rash    Sulfa (Sulfonamide Antibiotics) Nausea And Vomiting       Social History     Socioeconomic History    Marital status:      Spouse name: Not on file    Number of children: Not on file    Years of education: Not on file    Highest education level: Not on file   Occupational History    Not on file   Tobacco Use    Smoking status: Every Day     Current packs/day: 1.00     Average packs/day: 1 pack/day for 50.0 years (50.0 ttl pk-yrs)     Types: Cigarettes    Smokeless tobacco: Never   Substance and Sexual Activity    Alcohol use: Not Currently    Drug use: Not Currently    Sexual activity: Not on file   Other Topics Concern    Not on file   Social History Narrative    Not on file     Social Determinants of Health     Financial Resource Strain: Not on file   Food Insecurity: Not on file   Transportation Needs: Not on file   Physical Activity: Not on file   Stress: Not on file   Social Connections: Not on file   Intimate Partner Violence: Not on file   Housing Stability: Not on file       Past Surgical History:   Procedure Laterality Date    CARPAL TUNNEL RELEASE  09/24/2014    Neuroplasty Decompression Median Nerve At Carpal Tunnel    HIP FRACTURE SURGERY      TONSILLECTOMY  09/24/2014    Tonsillectomy       XR ribs 2 views left w chest pa or ap    Result Date: 9/15/2024  Interpreted By:  Smita Rees, STUDY: XR RIBS 2 VIEWS LEFT WITH CHEST PA OR AP;  9/15/2024 10:04 pm   INDICATION: Signs/Symptoms:fall, left lower rib pain.   COMPARISON: 04/11/2021   ACCESSION NUMBER(S): [id number]   ORDERING CLINICIAN: STEFF LEÓN   FINDINGS:     CARDIOMEDIASTINAL SILHOUETTE: Cardiomediastinal silhouette is normal in size and configuration.    LUNGS: No pulmonary consolidation, pleural effusion or pneumothorax.   ABDOMEN: No remarkable upper abdominal findings.   BONES: There are several chronic appearing left rib fracture deformities. No definite acute left rib fracture. Mildly displaced distal fracture clavicle fracture is noted.       No acute cardiopulmonary process. No definite acute left rib fracture. Several chronic appearing left rib fracture deformities are noted. Mildly displaced distal clavicle fracture.   MACRO: None   Signed by: Smita Rees 9/15/2024 10:35 PM Dictation workstation:   [id number]    XR shoulder left 2+ views    Result Date: 9/15/2024  Interpreted By:  Smita Rees, STUDY: XR SHOULDER LEFT 2+ VIEWS; ;  9/15/2024 10:04 pm   INDICATION: Signs/Symptoms:fall, concern for fract.   COMPARISON: None.   ACCESSION NUMBER(S): [id number]   ORDERING CLINICIAN: STEFF LEÓN   FINDINGS: Two views left shoulder. There is no acute fracture of the left clavicle, just distal to the level of the coracoid process/coracoclavicular ligament. The distal fragment is inferiorly displaced by 5 mm. No widening of the acromioclavicular joint. Glenohumeral alignment is within normal limits. There is glenohumeral and acromioclavicular osteoarthrosis. The imaged lung is clear.       Acute, mildly displaced distal left clavicle fracture.     MACRO: None   Signed by: Smita Rees 9/15/2024 10:31 PM Dictation workstation:   [id number]    XR shoulder right 2+ views    Result Date: 8/22/2024  Interpreted By:  Valdo Deal, STUDY: XR SHOULDER RIGHT 2+ VIEWS; ;  8/22/2024 9:38 pm   INDICATION: Signs/Symptoms:shoulder pain.   COMPARISON: 8/3/2023   ACCESSION NUMBER(S): [id number]   ORDERING CLINICIAN: RADHA MON   FINDINGS: No acute fracture or dislocation of the right shoulder. There is osteopenia in right shoulder osteoarthrosis. Loss of subacromial space compatible chronic rotator cuff tear. There is acromioclavicular osteoarthrosis.        No acute fracture or dislocation of the right shoulder.   Osteopenia and right shoulder osteoarthrosis.   Loss of subacromial space suggestive of chronic rotator cuff tear.     MACRO: None   Signed by: Valdo Deal 8/22/2024 10:07 PM Dictation workstation:   [id number]                             Scribe Attestation  By signing my name below, Margoth MOFFETT Scribe   attest that this documentation has been prepared under the direction and in the presence of Erik Tamayo MD.

## 2024-09-23 NOTE — PROGRESS NOTES
No chief complaint on file.    History of Present Illness:  Tatiana Hagan is a 69 y.o. female presenting to clinic with complaints of ***.    Imaging:  X-rays *** shoulder: ***     Assessment:   ***    Plan:  We reviewed the role of imaging, physical therapy, injections and the time frame to healing and correlation with outcome.  ***  NSAID: ***.  GI side effects and medical risks discussed  Ice: 60 minutes on and off  Exercise home program: Medically directed Shoulder therapy / Handout given  Follow-up in ***.      Physical Exam:  Well-nourished, well-developed. No acute distress. Alert and oriented x3. Responds appropriately to questioning. Good mood. Normal affect.  Physical Exam  *** Shoulder:  Strength / ROM: ***  Speeds test + impingement  Positive Neer and Hawkin´s test  Neurovascular exam normal distally     Review of Systems:  GENERAL: Negative for malaise, significant weight loss, fever  MUSCULOSKELETAL: See HPI  NEURO:  Negative     Past Medical History:   Diagnosis Date    Age-related osteoporosis without current pathological fracture     Osteoporosis    Reyes's esophagus without dysplasia 11/16/2019    Reyes's esophagus without dysplasia    Candidal esophagitis (Multi) 09/13/2018    Esophageal candidiasis    Encounter for gynecological examination (general) (routine) without abnormal findings     Encounter for gynecological examination without abnormal finding    Nonspecific reaction to tuberculin skin test without active tuberculosis     Nonspecific reaction to tuberculin skin test without active tuberculosis    Other conditions influencing health status     X-Ray    Other conditions influencing health status     Visit For: Routine Eye Exam    Personal history of other diseases of the musculoskeletal system and connective tissue     History of pseudogout    Rheumatoid arthritis, unspecified (Multi) 09/11/2018    Rheumatoid arthritis       Medication Documentation Review Audit       Reviewed by  Julisa Cook RN (Registered Nurse) on 09/15/24 at 2059      Medication Order Taking? Sig Documenting Provider Last Dose Status   acetaminophen (Tylenol 8 HOUR) 650 mg ER tablet [id number]  Take 2 tablets (1,300 mg) by mouth every 8 hours if needed for mild pain (1 - 3). Historical Provider, MD  Active   atorvastatin (Lipitor) 20 mg tablet [id number]  Take 1 tablet (20 mg) by mouth once daily. Shauna Bolanos MD  Active   betamethasone dipropionate (Diprosone) 0.05 % ointment [id number]  Apply 1 Application topically once daily as needed for rash. Shauna Bolanos MD  Active   denosumab (Prolia) 60 mg/mL syringe [id number]  Inject 1 mL (60 mg total) under the skin every 6 months. Historical Provider, MD  Active   denosumab (Prolia) injection 60 mg [id number]   Shauna Bolanos MD  Active   denosumab (Prolia) injection 60 mg [id number]   Shauna Bolanos MD  Active   gabapentin (Neurontin) 100 mg capsule [id number]  Take 1 capsule (100 mg) by mouth 3 times a day as needed (pain). Historical Provider, MD  Active   hydroCHLOROthiazide (HYDRODiuril) 25 mg tablet [id number]  Take 1 tablet (25 mg) by mouth once daily. Shauna Bolanos MD  Active   HYDROcodone-acetaminophen (Norco) 5-325 mg tablet [id number]  Take 1 tablet by mouth every 8 hours if needed for severe pain (7 - 10) for up to 9 doses. Josesito Regan,   Active   inFLIXimab (Remicade) 100 mg injection [id number]  Infuse 6mg/kg (400mg dose) IV once every 6 weeks   Patient taking differently: Infuse 5 mg/kg into a venous catheter 1 time.  Infuse 6mg/kg (400mg dose) IV once every 6 weeks    Shauna Bolanos MD  Active   lisinopril 5 mg tablet [id number]  Take 1 tablet (5 mg) by mouth once daily. Shauna Bolanos MD  Active   omeprazole (PriLOSEC) 20 mg DR capsule [id number]  Take 1 capsule (20 mg) by mouth once daily in the morning. Take before meals. Do not crush or chew. Historical Provider, MD  Active   predniSONE  (Deltasone) 5 mg tablet [id number]  Take 1 tablet (5 mg) by mouth once daily. Shauna Bolanos MD  Active                    Allergies   Allergen Reactions    Nsaids (Non-Steroidal Anti-Inflammatory Drug) Other     vomiting    Aspirin Other     vomiting    Penicillins Rash    Sulfa (Sulfonamide Antibiotics) Nausea And Vomiting       Social History     Socioeconomic History    Marital status:      Spouse name: Not on file    Number of children: Not on file    Years of education: Not on file    Highest education level: Not on file   Occupational History    Not on file   Tobacco Use    Smoking status: Every Day     Current packs/day: 1.00     Average packs/day: 1 pack/day for 50.0 years (50.0 ttl pk-yrs)     Types: Cigarettes    Smokeless tobacco: Never   Substance and Sexual Activity    Alcohol use: Not Currently    Drug use: Not Currently    Sexual activity: Not on file   Other Topics Concern    Not on file   Social History Narrative    Not on file     Social Determinants of Health     Financial Resource Strain: Not on file   Food Insecurity: Not on file   Transportation Needs: Not on file   Physical Activity: Not on file   Stress: Not on file   Social Connections: Not on file   Intimate Partner Violence: Not on file   Housing Stability: Not on file       Past Surgical History:   Procedure Laterality Date    CARPAL TUNNEL RELEASE  09/24/2014    Neuroplasty Decompression Median Nerve At Carpal Tunnel    HIP FRACTURE SURGERY      TONSILLECTOMY  09/24/2014    Tonsillectomy       XR ribs 2 views left w chest pa or ap    Result Date: 9/15/2024  Interpreted By:  Smita Rees, STUDY: XR RIBS 2 VIEWS LEFT WITH CHEST PA OR AP;  9/15/2024 10:04 pm   INDICATION: Signs/Symptoms:fall, left lower rib pain.   COMPARISON: 04/11/2021   ACCESSION NUMBER(S): [id number]   ORDERING CLINICIAN: STEFF LEÓN   FINDINGS:     CARDIOMEDIASTINAL SILHOUETTE: Cardiomediastinal silhouette is normal in size and configuration.    LUNGS: No pulmonary consolidation, pleural effusion or pneumothorax.   ABDOMEN: No remarkable upper abdominal findings.   BONES: There are several chronic appearing left rib fracture deformities. No definite acute left rib fracture. Mildly displaced distal fracture clavicle fracture is noted.       No acute cardiopulmonary process. No definite acute left rib fracture. Several chronic appearing left rib fracture deformities are noted. Mildly displaced distal clavicle fracture.   MACRO: None   Signed by: Smita Rees 9/15/2024 10:35 PM Dictation workstation:   [id number]    XR shoulder left 2+ views    Result Date: 9/15/2024  Interpreted By:  Smita Rees, STUDY: XR SHOULDER LEFT 2+ VIEWS; ;  9/15/2024 10:04 pm   INDICATION: Signs/Symptoms:fall, concern for fract.   COMPARISON: None.   ACCESSION NUMBER(S): [id number]   ORDERING CLINICIAN: STEFF LEÓN   FINDINGS: Two views left shoulder. There is no acute fracture of the left clavicle, just distal to the level of the coracoid process/coracoclavicular ligament. The distal fragment is inferiorly displaced by 5 mm. No widening of the acromioclavicular joint. Glenohumeral alignment is within normal limits. There is glenohumeral and acromioclavicular osteoarthrosis. The imaged lung is clear.       Acute, mildly displaced distal left clavicle fracture.     MACRO: None   Signed by: Smita Rees 9/15/2024 10:31 PM Dictation workstation:   [id number]    XR shoulder right 2+ views    Result Date: 8/22/2024  Interpreted By:  Valdo Deal, STUDY: XR SHOULDER RIGHT 2+ VIEWS; ;  8/22/2024 9:38 pm   INDICATION: Signs/Symptoms:shoulder pain.   COMPARISON: 8/3/2023   ACCESSION NUMBER(S): [id number]   ORDERING CLINICIAN: RADHA MON   FINDINGS: No acute fracture or dislocation of the right shoulder. There is osteopenia in right shoulder osteoarthrosis. Loss of subacromial space compatible chronic rotator cuff tear. There is acromioclavicular osteoarthrosis.        No acute fracture or dislocation of the right shoulder.   Osteopenia and right shoulder osteoarthrosis.   Loss of subacromial space suggestive of chronic rotator cuff tear.     MACRO: None   Signed by: Valdo Deal 8/22/2024 10:07 PM Dictation workstation:   [id number]                             Scribe Attestation  By signing my name below, Margoth MOFFETT Scribe   attest that this documentation has been prepared under the direction and in the presence of Erik Tamayo MD.

## 2024-09-24 ENCOUNTER — APPOINTMENT (OUTPATIENT)
Dept: ORTHOPEDIC SURGERY | Facility: CLINIC | Age: 69
End: 2024-09-24
Payer: MEDICARE

## 2024-10-01 DIAGNOSIS — M05.79 RHEUMATOID ARTHRITIS INVOLVING MULTIPLE SITES WITH POSITIVE RHEUMATOID FACTOR (MULTI): Primary | ICD-10-CM

## 2024-10-01 RX ORDER — DIPHENHYDRAMINE HYDROCHLORIDE 50 MG/ML
50 INJECTION INTRAMUSCULAR; INTRAVENOUS AS NEEDED
OUTPATIENT
Start: 2024-10-01

## 2024-10-01 RX ORDER — EPINEPHRINE 0.3 MG/.3ML
0.3 INJECTION SUBCUTANEOUS EVERY 5 MIN PRN
OUTPATIENT
Start: 2024-10-01

## 2024-10-01 RX ORDER — FAMOTIDINE 10 MG/ML
20 INJECTION INTRAVENOUS ONCE AS NEEDED
OUTPATIENT
Start: 2024-10-01

## 2024-10-01 RX ORDER — ALBUTEROL SULFATE 0.83 MG/ML
3 SOLUTION RESPIRATORY (INHALATION) AS NEEDED
OUTPATIENT
Start: 2024-10-01

## 2024-10-01 RX ORDER — INFLIXIMAB 100 MG/10ML
INJECTION, POWDER, LYOPHILIZED, FOR SOLUTION INTRAVENOUS
Qty: 4 EACH | Refills: 5 | OUTPATIENT
Start: 2024-10-01

## 2024-10-09 ENCOUNTER — APPOINTMENT (OUTPATIENT)
Dept: INFUSION THERAPY | Facility: CLINIC | Age: 69
End: 2024-10-09
Payer: MEDICARE

## 2024-10-17 ENCOUNTER — APPOINTMENT (OUTPATIENT)
Dept: RHEUMATOLOGY | Facility: CLINIC | Age: 69
End: 2024-10-17
Payer: MEDICARE

## 2024-10-17 VITALS
HEIGHT: 64 IN | HEART RATE: 96 BPM | BODY MASS INDEX: 18.51 KG/M2 | SYSTOLIC BLOOD PRESSURE: 102 MMHG | WEIGHT: 108.4 LBS | TEMPERATURE: 98.3 F | RESPIRATION RATE: 18 BRPM | DIASTOLIC BLOOD PRESSURE: 74 MMHG | OXYGEN SATURATION: 96 %

## 2024-10-17 DIAGNOSIS — M05.79 RHEUMATOID ARTHRITIS INVOLVING MULTIPLE SITES WITH POSITIVE RHEUMATOID FACTOR (MULTI): ICD-10-CM

## 2024-10-17 DIAGNOSIS — I10 PRIMARY HYPERTENSION: ICD-10-CM

## 2024-10-17 DIAGNOSIS — L40.59 POLYARTICULAR PSORIATIC ARTHRITIS (MULTI): Primary | ICD-10-CM

## 2024-10-17 RX ORDER — HYDROCHLOROTHIAZIDE 25 MG/1
25 TABLET ORAL DAILY
Qty: 90 TABLET | Refills: 1 | Status: SHIPPED | OUTPATIENT
Start: 2024-10-17

## 2024-10-17 RX ORDER — PREDNISONE 5 MG/1
5 TABLET ORAL DAILY
Qty: 90 TABLET | Refills: 1 | Status: SHIPPED | OUTPATIENT
Start: 2024-10-17

## 2024-10-17 RX ORDER — HYDROGEN PEROXIDE 3 %
20 SOLUTION, NON-ORAL MISCELLANEOUS
COMMUNITY

## 2024-10-17 RX ORDER — LISINOPRIL 5 MG/1
5 TABLET ORAL DAILY
Qty: 90 TABLET | Refills: 1 | Status: SHIPPED | OUTPATIENT
Start: 2024-10-17

## 2024-10-17 ASSESSMENT — PATIENT HEALTH QUESTIONNAIRE - PHQ9
1. LITTLE INTEREST OR PLEASURE IN DOING THINGS: NOT AT ALL
SUM OF ALL RESPONSES TO PHQ9 QUESTIONS 1 AND 2: 0
2. FEELING DOWN, DEPRESSED OR HOPELESS: NOT AT ALL

## 2024-10-17 ASSESSMENT — ENCOUNTER SYMPTOMS
LOSS OF SENSATION IN FEET: 0
DEPRESSION: 0
OCCASIONAL FEELINGS OF UNSTEADINESS: 1

## 2024-10-17 NOTE — PROGRESS NOTES
Subjective   Patient ID: Tatiana Hagan is a 69 y.o. female who presents for Medicare wellness visit, as well as follow-up regarding overlap condition of rheumatoid arthritis and psoriatic arthritis, osteoporosis and CPPD arthropathy..    HPI 68 -year-old woman with history of overlap of rheumatoid arthritis and psoriatic arthritis , history of osteoporosis and CPPD arthropathy , who is here for follow-up visit.      The patient fell and fractured her left hip September 29, 2023.  Her leg is now feeling better and she is ambulating without assistance.    She also tripped and fell over her son's wheelchair August 22, 2024.  She had a fracture of her distal left clavicle which is now healed.  She can raise her arm above her head again.    She lost significant weight while in the rehab center . She is slowly regaining weight she does state that her appetite is good..  Current weight is 108 pounds with BMI 18.61 (loss of 2 pounds since her last visit).    Prolia injection was given 6/13/24.  Next will be due 12/13/24.     She had Kenalog injection of her right knee with good relief  11/29/23.    She remains on Remicade 6 mg/kg (400 mg total) IV every 6 weeks, prednisone 5 mg daily. She tried to taper her prednisone to 2.5 mg daily a few months ago but was unable to tolerate the taper.     Her psoriasis is stable.     She did have a cardiac calcium score 3/20 that was 188. Dr. Mo prescribed atorvastatin 80 mg daily.  However, she tells me that she stopped the atorvastatin shortly after starting it because she thought it was affecting her cognition (I was not aware of this).  Lipids done June 2024, on no statin therapy, showed cholesterol 178 and LDL 81.  ASCVD risk score is 22.8%.     She continues to smoke 1 pack/day which she has done for 50 years. She is not currently interested in quitting smoking- this was discussed July 16, 2024.    She has a history of erosive esophagitis and esophageal strictures. Her last EGD  and dilation was 3/19 with Dr. Cm.  Biopsies were negative for Reyes's esophagus.    She has a history of serrated colon polyps.  Her last colonoscopy was May 2018, she was due for follow-up May 2021.  This was discussed again April 9, 2024 but she did not want to follow-up at that time.  I have again recommended that she do follow-up colonoscopy, which she is now agreeable to .  This was discussed 7/16/24 and 10/17/24.    She started Prolia 10/20.      She had an EGD and colonoscopy with Dr. Renee 3/19. Biopsies at that time did not show evidence of Reyes's esophagus. She did have serrated polyps- was told to do 3 year f/u.     EKG 9/23: normal.     Labs January 2024: CBC normal except white blood cell count 16.6, CMP except sodium 133, 25-hydroxy vitamin D 22, CRP less than 0.1  Labs June 2024: Cholesterol 178, HDL 65, LDL 81, triglycerides 161, ESR 12, CBC normal except white blood cell count 12.1, CMP normal except glucose 114     She received Reclast 10/13, 11/14, and 12/15.  DEXA 9/17: T score -3.2 femoral neck (decline of 4.8%), T score -3.0 total hip, T score -1.9 lumbar spine.   Received reclast 4/18 (because of loss of BMD 9/17), 7/19.    DEXA 10/19: T score -3.3 femoral neck (stable), T score -3.4 total hip (decline of 7.3%), T score -2.6 lumbar spine (decline of 8.5%)  DEXA December 2021: T score -2.8 left femoral neck (increased 13.2%), T score -3.2 left total hip (increased 4.3%), T score -2.3 lumbar spine (increased 3.6%)  DEXA 12/23: T-score -3.4 right total hip, T-score -2.9 right femoral neck, T-score -1.9 LS spine (increased 5.4%)     Health maintenance:   Mammograms have been refused.   Colonoscopy 5/18- needs 3 year follow-up because of serrated polyps. Discussed again 4/09/24, 7/16/24, 10/17/24.   EGD 3/18: 3 cm hiatal hernia, erosive gastritis, ring in distal 1/3 of esophagus (dilated)..   EGD 3/19: biopsies negative for Reyes's esophagus.   Pneumovax 2005, 5/21   Prevnar 13 1/18    "Moderna COVID-19 vaccine March 3, 2021, April 3, 2021. Received booster 10/20/21 and 6/22. BV booster was given 10/06/22.    eye exam 1/16   QuantiFERON-TB Gold negative 8/18  T spot negative 5/21   Low dose chest CT (for lung cancer screening) 7/24-needs 1 year follow-up.     Medical problem list:   - RA (, )- previously failed MTX.   Took MTX from 4440-6528 (not certain why it was stopped).   Took Humira from 4324-3141.   Changed to remicade 2012 (because of worsening of psoriasis).      - psoriatic arthritis-    - Essential hypertension   - h/o CPPD arthropathy knee 1/09   - Cardiac calcium score 188 March 2020   - Osteoporosis- took Forteo 5820-7645.    Started reclast 2010-had infusions October 2013, November 2014, December 2015, May 2018, July 2019.    Started Prolia 10/20 (due to loss of BMD and hip on DEXA October 2019.    - h/o bilateral sacral insufficiency fractures 2/21   - h/o left hip fracture 9/23   - h/o erosive esophagitis with recurrent strictures   - h/o serrated colon polyps   - left clavicle fracture     ROS:  General: Denies fevers or chills.  CV: Denies chest pain or palpitations.  Denies leg edema.  Lungs: Denies coughing or shortness of breath.  Skin: Denies rashes or nodules.  MS: Denies joint pain (except left hip) or joint swelling.            Objective   /74 (BP Location: Left arm, Patient Position: Sitting, BP Cuff Size: Adult)   Pulse 96   Temp 36.8 °C (98.3 °F) (Skin)   Resp 18   Ht 1.626 m (5' 4\")   Wt 49.2 kg (108 lb 6.4 oz)   SpO2 96%   BMI 18.61 kg/m²     Physical Exam  HEENT: PERRL, EOMI  Neck: Supple, no nodes.  CV: RRR, no MGR.  Lungs: Clear, no rales or wheezes.  Abdomen: Soft, nontender. No hepatosplenomegaly.  Extremities:  No cyanosis, clubbing, or edema.  MS: Swollen: 0         Tender: 0         Patient global: 4         Evaluator global: 4          CDAI: 8 (low disease activity)            Skin: Has plaque in the pretibial area of left " leg.      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 RA/ psoriatic arthritis- overall stable .  She is currently low disease activity by CDAI. She did not tolerate trying to reduce prednisone to 2.5 mg daily a few months ago , currently is back on 5 mg daily . Continue Remicade 6 mg/kg IV every 6 weeks.  Right knee was injected 11/23 with Kenalog.     2. Hypertension- at goal.      3. Osteoporosis- Pat fractures include left hip 9/23 and bilateral sacral insufficiency fractures 2/21.  Took forteo 2008- 2010.   Received Reclast October 2013, November 2014, December 2015, then again May 2018 (because of loss of BMD) and 7/19. DEXA 10/19 shows loss of BMD in hip..   Started prolia 10/20. Last dose was 6/13/24.. next dose is due 12/13/24.  DEXA done December 2023 shows T-score -2.9 right femoral neck, T-score -3.4 right total hip, T-score -1.9 lumbar spine.  Bone density  has increased 5.4% in the lumbar spine. Since right hip was used, cannot do comparison.  Next prolia is due 12/13/24. She was told to call infusion center to schedule.   Next DEXA will be due 12/25.      4. History of erosive gastritis- continue Nexium. She had a recent episode of food sticking 7/21-I recommend that she follow-up with Dr. Cm to consider repeat EGD in case she needs to be dilated again. She is also due for her 3-year follow-up colonoscopy because of serrated polyps in March 2018.  This was discussed again in April 9, 2024 and 7/16/24 .  She was referred to GI for colonoscopy (discussed again 10/17/24).     5. . Cardiac calcium score 188 March 2020-she stopped  atorvastatin 80 mg because she thought it was affecting her cognition.  I recommend try a atorvastatin 20 mg daily.     6. Lung cancer screening-low-dose chest CT done 7/24,  needs 1 year follow-up.     8. Advanced Care planning-Discussed 7/16/24. she has a living well. HPOA is  [place]. She is full code.     9. BMI 18.61-slowly improving since she left the rehab facility. Did lose 2 pounds since her last appointment.    10.  Medicare wellness visit- done 7/16/24.    Plan:  Please schedule Prolia injection for mid December.  You can get flu shot and COVID vaccine at your pharmacy.  Follow up in 3 months.

## 2024-10-17 NOTE — PATIENT INSTRUCTIONS
Please schedule Prolia injection for mid December.  You can get flu shot and COVID vaccine at your pharmacy.  Follow up in 3 months.

## 2024-10-24 ENCOUNTER — APPOINTMENT (OUTPATIENT)
Dept: INFUSION THERAPY | Facility: CLINIC | Age: 69
End: 2024-10-24
Payer: MEDICARE

## 2024-10-24 VITALS
SYSTOLIC BLOOD PRESSURE: 120 MMHG | BODY MASS INDEX: 18.9 KG/M2 | RESPIRATION RATE: 18 BRPM | DIASTOLIC BLOOD PRESSURE: 69 MMHG | OXYGEN SATURATION: 98 % | TEMPERATURE: 97.7 F | WEIGHT: 110.12 LBS | HEART RATE: 75 BPM

## 2024-10-24 DIAGNOSIS — M05.761 RHEUMATOID ARTHRITIS INVOLVING BOTH KNEES WITH POSITIVE RHEUMATOID FACTOR (MULTI): ICD-10-CM

## 2024-10-24 DIAGNOSIS — M05.762 RHEUMATOID ARTHRITIS INVOLVING BOTH KNEES WITH POSITIVE RHEUMATOID FACTOR (MULTI): ICD-10-CM

## 2024-10-24 RX ORDER — FAMOTIDINE 10 MG/ML
20 INJECTION INTRAVENOUS ONCE AS NEEDED
OUTPATIENT
Start: 2024-11-20

## 2024-10-24 RX ORDER — EPINEPHRINE 0.3 MG/.3ML
0.3 INJECTION SUBCUTANEOUS EVERY 5 MIN PRN
OUTPATIENT
Start: 2024-11-20

## 2024-10-24 RX ORDER — ALBUTEROL SULFATE 0.83 MG/ML
3 SOLUTION RESPIRATORY (INHALATION) AS NEEDED
OUTPATIENT
Start: 2024-11-20

## 2024-10-24 RX ORDER — DIPHENHYDRAMINE HYDROCHLORIDE 50 MG/ML
50 INJECTION INTRAMUSCULAR; INTRAVENOUS AS NEEDED
OUTPATIENT
Start: 2024-11-20

## 2024-10-24 ASSESSMENT — ENCOUNTER SYMPTOMS
HEADACHES: 1
WOUND: 0
MYALGIAS: 1
DIZZINESS: 1
SHORTNESS OF BREATH: 0
BACK PAIN: 1
ARTHRALGIAS: 1

## 2024-10-24 NOTE — PROGRESS NOTES
[facility]   Infusion Clinic Note   Date: 2024   Name: Tatiana Hagan  : 1955   MRN: [id number]         Reason for Visit: OP Infusion (Remicade 400 mg)         Today: We administered inFLIXimab (Remicade) 400 mg in sodium chloride 0.9% 250 mL IV.       Visit Type: INFUSION       Ordered By: Shauna Bolanos MD       Accompanied by: Self      Diagnosis: Rheumatoid arthritis involving both knees with positive rheumatoid factor (Multi)       Allergies:   Allergies as of 10/24/2024 - Reviewed 10/24/2024   Allergen Reaction Noted    Nsaids (non-steroidal anti-inflammatory drug) Other 2023    Aspirin Other 2023    Penicillins Rash 2023    Sulfa (sulfonamide antibiotics) Nausea And Vomiting 2020         Current Medications has a current medication list which includes the following prescription(s): acetaminophen, atorvastatin, betamethasone dipropionate, denosumab, esomeprazole, hydrochlorothiazide, infliximab, lisinopril, and prednisone, and the following Facility-Administered Medications: denosumab, denosumab, and inFLIXimab (Remicade) 400 mg in sodium chloride 0.9% 250 mL IV.       Vitals:   Vitals:    10/24/24 0914 10/24/24 0958   BP: 116/63 120/69   Pulse: 79 75   Resp: 16 18   Temp: 36.5 °C (97.7 °F)    SpO2: 96% 98%   Weight: 50 kg (110 lb 1.9 oz)              Infusion Pre-procedure Checklist:   - Allergies reviewed: yes   - Medications reviewed: yes       - Previous reaction to current treatment: no      Assess patient for the concerns below. Document provider notification as appropriate.  - Active or recent infection with/without current antibiotic use: no  - Recent or planned invasive dental work: no  - Recent or planned surgeries: no  - Recently received or plans to receive vaccinations: no  - Has treatment related toxicities: no  - Is pregnant:  n/a      Pain: 5 GENERALIZED JOINTS   - Is the pain different from normal: no   - Is the pain  tolerable: yes   - Is your Doctor aware:  yes      Labs:  REVIEWED         Fall Risk Screening: Cheng Fall Risk  History of Falling, Immediate or Within 3 Months: Yes (trip and fell fx lt shoulder)  Ambulatory Aid: Walks without aid/bedrest/nurse assist  Intravenous Therapy/Heparin Lock: Yes  Gait/Transferring: Normal/bedrest/immobile  Mental Status: Oriented to own ability       Review Of Systems:  Review of Systems   Respiratory:  Negative for shortness of breath.    Cardiovascular:  Negative for chest pain.   Musculoskeletal:  Positive for arthralgias, back pain and myalgias.        Rt knee  Lt shoulder   Skin: Negative.  Negative for rash and wound.   Neurological:  Positive for dizziness and headaches.         Infusion Readiness:   - Assessment Concerns Related to Infusion: No  - Provider notified: n/a      Document Below Only If Indicated:   New Patient Education:    N/A (returning patient for continuation of therapy. Ongoing education provided as needed.)        Treatment Conditions & Drug Specific Questions:    InFLIXimab  (AVSOLA, INFLECTRA, REMICADE, RENFLEXIS)    (Unless otherwise specified on patient specific therapy plan):     TREATMENT CONDITIONS:  Unless otherwise specified on patient specific therapy plan HOLD and notify Provider prior to proceeding with today's infusion if patient with:  o Positive T-Spot  o Reactive Hep B sAg and/or Hep B Core Antibody    Lab Results   Component Value Date    TBSIN Negative 06/13/2022    QFG NEGATIVE 08/28/2018          Labs reviewed and patient meets treatment conditions? Yes    DRUG SPECIFIC QUESTIONS:    Any new or worsening signs / symptoms of heart failure which may include things like worsening shortness of breath, swelling, fatigue? denies  (If yes notify provider before proceeding with today's infusion. New onset or worsening HF have been reported with infliximab)    REMINDER:   WEIGHT BASED DRUG     Dose weight 49.9 kg    45kg - 54.8 kg yes pt WNL for  dose      Recommended Vitals/Observation:  Vitals:     Induction: Obtain vital signs every 30 minutes; at end of observation period and as needed.     Maintenance: Obtain vital signs at start and end of infusions  Observation:     Induction: Patient is monitored for 30 minutes post-infusion     Maintenance: No observation.              Initiated By: Bhavna Reid RN        All questions and concerns were addressed at time of visit. Patient was not independently evaluated by the Nurse Practitioner on site at [facility].    10/24/24 at 11:34 AM - ROXIE Gao-CNP

## 2024-11-20 ENCOUNTER — APPOINTMENT (OUTPATIENT)
Dept: INFUSION THERAPY | Facility: CLINIC | Age: 69
End: 2024-11-20
Payer: MEDICARE

## 2024-12-05 ENCOUNTER — APPOINTMENT (OUTPATIENT)
Dept: INFUSION THERAPY | Facility: CLINIC | Age: 69
End: 2024-12-05
Payer: MEDICARE

## 2024-12-05 VITALS
DIASTOLIC BLOOD PRESSURE: 67 MMHG | WEIGHT: 110.23 LBS | HEART RATE: 62 BPM | BODY MASS INDEX: 18.92 KG/M2 | OXYGEN SATURATION: 98 % | TEMPERATURE: 97.2 F | RESPIRATION RATE: 16 BRPM | SYSTOLIC BLOOD PRESSURE: 134 MMHG

## 2024-12-05 DIAGNOSIS — M05.761 RHEUMATOID ARTHRITIS INVOLVING BOTH KNEES WITH POSITIVE RHEUMATOID FACTOR (MULTI): Primary | ICD-10-CM

## 2024-12-05 DIAGNOSIS — M05.762 RHEUMATOID ARTHRITIS INVOLVING BOTH KNEES WITH POSITIVE RHEUMATOID FACTOR (MULTI): Primary | ICD-10-CM

## 2024-12-05 PROCEDURE — 96413 CHEMO IV INFUSION 1 HR: CPT | Performed by: REGISTERED NURSE

## 2024-12-05 RX ORDER — ALBUTEROL SULFATE 0.83 MG/ML
3 SOLUTION RESPIRATORY (INHALATION) AS NEEDED
OUTPATIENT
Start: 2025-01-16

## 2024-12-05 RX ORDER — DIPHENHYDRAMINE HYDROCHLORIDE 50 MG/ML
50 INJECTION INTRAMUSCULAR; INTRAVENOUS AS NEEDED
OUTPATIENT
Start: 2025-01-16

## 2024-12-05 RX ORDER — EPINEPHRINE 0.3 MG/.3ML
0.3 INJECTION SUBCUTANEOUS EVERY 5 MIN PRN
OUTPATIENT
Start: 2025-01-16

## 2024-12-05 RX ORDER — FAMOTIDINE 10 MG/ML
20 INJECTION INTRAVENOUS ONCE AS NEEDED
OUTPATIENT
Start: 2025-01-16

## 2024-12-05 ASSESSMENT — ENCOUNTER SYMPTOMS
HEMATOLOGIC/LYMPHATIC NEGATIVE: 1
RESPIRATORY NEGATIVE: 1
PSYCHIATRIC NEGATIVE: 1
GASTROINTESTINAL NEGATIVE: 1
EYES NEGATIVE: 1
ENDOCRINE NEGATIVE: 1
CONSTITUTIONAL NEGATIVE: 1
NEUROLOGICAL NEGATIVE: 1
MUSCULOSKELETAL NEGATIVE: 1
CARDIOVASCULAR NEGATIVE: 1

## 2024-12-05 NOTE — PROGRESS NOTES
[facility]   Infusion Clinic Note   Date: 2024   Name: Tatiana Hagan  : 1955   MRN: [id number]          Reason for Visit: OP Infusion (Remicade 400mg Q6 Weeks)         Today: We administered inFLIXimab (Remicade) 400 mg in sodium chloride 0.9% 250 mL IV.       Visit Type: INFUSION       Ordered By: CARMEN FAM MD       Accompanied by:Self       Diagnosis: Rheumatoid arthritis involving both knees with positive rheumatoid factor (Multi)        Allergies:   Allergies as of 2024 - Reviewed 2024   Allergen Reaction Noted    Nsaids (non-steroidal anti-inflammatory drug) Other 2023    Aspirin Other 2023    Penicillins Rash 2023    Sulfa (sulfonamide antibiotics) Nausea And Vomiting 2020          Current Medications has a current medication list which includes the following prescription(s): acetaminophen, atorvastatin, betamethasone dipropionate, denosumab, esomeprazole, hydrochlorothiazide, infliximab, lisinopril, and prednisone, and the following Facility-Administered Medications: denosumab and denosumab.       Vitals:   Vitals:    24 1309   BP: 134/67   Pulse: 62   Resp: 16   Temp: 36.2 °C (97.2 °F)   SpO2: 98%   Weight: 50 kg (110 lb 3.7 oz)             Infusion Pre-procedure Checklist:   - Allergies reviewed: yes   - Medications reviewed: yes       - Previous reaction to current treatment: no      Assess patient for the concerns below. Document provider notification as appropriate.  - Active or recent infection with/without current antibiotic use: no  - Recent or planned invasive dental work: no  - Recent or planned surgeries: no  - Recently received or plans to receive vaccinations: no  - Has treatment related toxicities: no  - Is pregnant:  n/a      Pain: 0   - Is the pain different from normal: n/a   - Is your Doctor aware:  n/a       Labs: N/A          Fall Risk Screening: Cheng Fall Risk  History of Falling, Immediate or  "Within 3 Months: No  Secondary Diagnosis: No  Ambulatory Aid: Walks without aid/bedrest/nurse assist  Intravenous Therapy/Heparin Lock: No  Gait/Transferring: Normal/bedrest/immobile  Mental Status: Oriented to own ability  Anand Fall Risk Score: 0       Review Of Systems:  Review of Systems   Constitutional: Negative.    HENT:  Negative.     Eyes: Negative.    Respiratory: Negative.     Cardiovascular: Negative.    Gastrointestinal: Negative.    Endocrine: Negative.    Genitourinary: Negative.     Musculoskeletal: Negative.    Skin: Negative.    Neurological: Negative.    Hematological: Negative.    Psychiatric/Behavioral: Negative.           ROS completed? Yes      Infusion Readiness:  - Assessment Concerns Related to Infusion: No  - Provider notified: n/a      Document Below Only If Indicated:   New Patient Education:    N/A (returning patient for continuation of therapy. Ongoing education provided as needed.)        Treatment Conditions & Drug Specific Questions:    InFLIXimab  (AVSOLA, INFLECTRA, REMICADE, RENFLEXIS)    (Unless otherwise specified on patient specific therapy plan):     TREATMENT CONDITIONS:  Unless otherwise specified on patient specific therapy plan HOLD and notify Provider prior to proceeding with today's infusion if patient with:  o Positive T-Spot  o Reactive Hep B sAg and/or Hep B Core Antibody    Lab Results   Component Value Date    TBSIN Negative 06/13/2022    QFG NEGATIVE 08/28/2018      No results found for: \"HAGCN\", \"HEPBSURABI\", \"HBSAG\", \"XHAGF\", \"HEPBSAG\", \"EXTHEPBSAG\", \"NONUHSWGH\"   No results found for: \"NONUHFIRE\", \"NONUHSWGH\", \"NONUHFISH\", \"EXTHEPBSAG\"  No results found for: \"HBCTI\", \"HEPBCAB\"  No results found for: \"HEPATOT\", \"HEPAIGM\", \"HEPBCIGM\", \"HEPBCAB\", \"HBEAG\", \"HEPCAB\"     Labs reviewed and patient meets treatment conditions? Yes    DRUG SPECIFIC QUESTIONS:    Any new or worsening signs / symptoms of heart failure which may include things like worsening shortness of " breath, swelling, fatigue? No   (If yes notify provider before proceeding with today's infusion. New onset or worsening HF have been reported with infliximab)    REMINDER:   WEIGHT BASED DRUG     Recommended Vitals/Observation:  Vitals:     Induction: Obtain vital signs every 30 minutes; at end of observation period and as needed.     Maintenance: Obtain vital signs at start and end of infusions  Observation:     Induction: Patient is monitored for 30 minutes post-infusion     Maintenance: No observation.        Weight Based Drug Calculations:    WEIGHT BASED DRUGS: Infliximab (REMICADE, INFLECTRA, RENFLEXIS)   Patient's dosing weight (kg): 49.9kg     10% weight variance for prescribed treatment: 44.9 kg to 54.9 kg     Patient's weight today:   Vitals:    12/05/24 1309   Weight: 50 kg (110 lb 3.7 oz)         weight range for prescribed dose:     Patient weight today falls outside of 10% variance or  weight range: No     Home Care pharmacist informed of weight variance: Not applicable    Doses that are weight based have an acceptable variance rule within 10% of the prescribed   order and/or within  weight range. If patient weight on day of infusion falls   outside of the 10% variance, or weight range, infusion is administered and   pharmacy contacted regarding future dosing adjustments, per policy.         Initiated By: Madhu Alcala RN      All questions and concerns were addressed at time of visit by nursing staff. Patient was not independently evaluated by the Nurse Practitioner on site at [facility].    12/05/24 at 3:08 PM - ROXIE Gao-CNP

## 2024-12-05 NOTE — PATIENT INSTRUCTIONS
Today you received:  REMICADE 400MG INFUSION Q42 DAYS       For:    1. Rheumatoid arthritis involving both knees with positive rheumatoid factor (Multi)            Please read the  Medication Guide that was given to you and reviewed during todays visit.     (Tell all doctors including dentists that you are taking this medication)     Go to the emergency room or call 911 if:  -You have signs of allergic reaction:   o         Rash, hives, itching.   o         Swollen, blistered, peeling skin.   o         Swelling of face, lips, mouth, tongue or throat.   o         Tightness of chest, trouble breathing, swallowing or talking      Call your doctor:     - If IV / injection site gets red, warm, swollen, itchy or leaks fluid or pus.     (Leave dressing on your IV site for at least 2 hours and keep area clean and dry    - If you get sick or have symptoms of infection or are not feeling well for any reason.    (Wash your hands often, stay away from people who are sick)    - If you have side effects from your medication that do not go away or are bothersome.     (Refer to the teaching your nurse gave you for side effects to call your doctor about)     Common side effects may include:  stuffy nose, headache, feeling tired, muscle aches, upset stomach    - Before receiving any vaccines, Call the [facility] at [phone]     - You get sick, are on antibiotics, have had a recent vaccine, have surgery or dental work and your doctor wants your visit rescheduled.    - You need to cancel and reschedule your visit for any reason. Call at least 2 days before your visit if you need to cancel.     - Your insurance changes before your next visit.    (We will need to get approval from your new insurance. This can take up to two weeks.)     The [facility] is opened Monday thru Friday 8am-8pm and Saturday 8am-4:30pm. We are closed on holidays.     Voice mail will take your call if the center is closed. If  you leave a message please allow 24 hours for a call back during weekdays. If you leave a message on a weekend/holiday, we will call you back the next business day.

## 2025-01-20 ENCOUNTER — APPOINTMENT (OUTPATIENT)
Dept: RHEUMATOLOGY | Facility: CLINIC | Age: 70
End: 2025-01-20
Payer: MEDICARE

## 2025-01-20 ENCOUNTER — LAB (OUTPATIENT)
Dept: LAB | Facility: LAB | Age: 70
End: 2025-01-20
Payer: MEDICARE

## 2025-01-20 VITALS
HEIGHT: 64 IN | SYSTOLIC BLOOD PRESSURE: 128 MMHG | OXYGEN SATURATION: 98 % | DIASTOLIC BLOOD PRESSURE: 82 MMHG | HEART RATE: 92 BPM | BODY MASS INDEX: 19.02 KG/M2 | RESPIRATION RATE: 16 BRPM | TEMPERATURE: 98.4 F | WEIGHT: 111.4 LBS

## 2025-01-20 DIAGNOSIS — M81.0 AGE RELATED OSTEOPOROSIS, UNSPECIFIED PATHOLOGICAL FRACTURE PRESENCE: ICD-10-CM

## 2025-01-20 DIAGNOSIS — M81.0 AGE-RELATED OSTEOPOROSIS WITHOUT CURRENT PATHOLOGICAL FRACTURE: ICD-10-CM

## 2025-01-20 DIAGNOSIS — L40.59 POLYARTICULAR PSORIATIC ARTHRITIS (MULTI): ICD-10-CM

## 2025-01-20 DIAGNOSIS — M05.79 RHEUMATOID ARTHRITIS INVOLVING MULTIPLE SITES WITH POSITIVE RHEUMATOID FACTOR (MULTI): Primary | ICD-10-CM

## 2025-01-20 DIAGNOSIS — R93.1 AGATSTON CAC SCORE 100-199: ICD-10-CM

## 2025-01-20 PROCEDURE — 82306 VITAMIN D 25 HYDROXY: CPT

## 2025-01-20 PROCEDURE — 3079F DIAST BP 80-89 MM HG: CPT | Performed by: INTERNAL MEDICINE

## 2025-01-20 PROCEDURE — 1160F RVW MEDS BY RX/DR IN RCRD: CPT | Performed by: INTERNAL MEDICINE

## 2025-01-20 PROCEDURE — 1159F MED LIST DOCD IN RCRD: CPT | Performed by: INTERNAL MEDICINE

## 2025-01-20 PROCEDURE — 3008F BODY MASS INDEX DOCD: CPT | Performed by: INTERNAL MEDICINE

## 2025-01-20 PROCEDURE — 80053 COMPREHEN METABOLIC PANEL: CPT

## 2025-01-20 PROCEDURE — 99214 OFFICE O/P EST MOD 30 MIN: CPT | Performed by: INTERNAL MEDICINE

## 2025-01-20 PROCEDURE — 3074F SYST BP LT 130 MM HG: CPT | Performed by: INTERNAL MEDICINE

## 2025-01-20 PROCEDURE — 1125F AMNT PAIN NOTED PAIN PRSNT: CPT | Performed by: INTERNAL MEDICINE

## 2025-01-20 RX ORDER — EPINEPHRINE 0.3 MG/.3ML
0.3 INJECTION SUBCUTANEOUS EVERY 5 MIN PRN
OUTPATIENT
Start: 2025-01-20

## 2025-01-20 RX ORDER — FAMOTIDINE 10 MG/ML
20 INJECTION INTRAVENOUS ONCE AS NEEDED
OUTPATIENT
Start: 2025-01-20

## 2025-01-20 RX ORDER — ALBUTEROL SULFATE 0.83 MG/ML
3 SOLUTION RESPIRATORY (INHALATION) AS NEEDED
OUTPATIENT
Start: 2025-01-20

## 2025-01-20 RX ORDER — DIPHENHYDRAMINE HYDROCHLORIDE 50 MG/ML
50 INJECTION INTRAMUSCULAR; INTRAVENOUS AS NEEDED
OUTPATIENT
Start: 2025-01-20

## 2025-01-20 ASSESSMENT — PAIN SCALES - GENERAL: PAINLEVEL_OUTOF10: 8

## 2025-01-20 NOTE — PROGRESS NOTES
Subjective   Patient ID: Tatiana Hagan is a 70 y.o. female who presents for Medicare wellness visit, as well as follow-up regarding overlap condition of rheumatoid arthritis and psoriatic arthritis, osteoporosis and CPPD arthropathy..    HPI 70 -year-old woman with history of overlap of rheumatoid arthritis and psoriatic arthritis , history of osteoporosis and CPPD arthropathy , who is here for follow-up visit.      The patient fell and fractured her left hip September 29, 2023.  Her leg is now feeling better and she is ambulating without assistance.    She also tripped and fell over her son's wheelchair August 22, 2024.  She had a fracture of her distal left clavicle which is now healed.  She can raise her arm above her head again.    She lost significant weight while in the rehab center . She is slowly regaining weight she does state that her appetite is good..  Current weight is 111 pounds with BMI 19.1.    Prolia injection was given 6/13/24.  Next was due 12/13/24- not yet scheduled.     She had Kenalog injection of her right knee with good relief  11/29/23.    She remains on Remicade 6 mg/kg (400 mg total) IV every 6 weeks, prednisone 5 mg daily. She tried to taper her prednisone to 2.5 mg daily a few months ago but was unable to tolerate the taper.     Her psoriasis is stable.     She did have a cardiac calcium score 3/20 that was 188. Dr. Mo prescribed atorvastatin 80 mg daily.  However, she tells me that she stopped the atorvastatin shortly after starting it because she thought it was affecting her cognition (I was not aware of this).  Lipids done June 2024, on no statin therapy, showed cholesterol 178 and LDL 81.  ASCVD risk score is 22.8%.  She resumed atorvastatin 20 mg daily.     She continues to smoke 1 pack/day which she has done for 50 years. She is not currently interested in quitting smoking- this was discussed July 16, 2024.    She has a history of erosive esophagitis and esophageal strictures.  Her last EGD and dilation was 3/19 with Dr. Cm.  Biopsies were negative for Reyes's esophagus.    She has a history of serrated colon polyps.  Her last colonoscopy was May 2018, she was due for follow-up May 2021.  This was discussed again April 9, 2024 but she did not want to follow-up at that time.  I have again recommended that she do follow-up colonoscopy, which she is now agreeable to .  This was discussed 7/16/24 and 10/17/24. Order has been placed.    She started Prolia 10/20.      EKG 9/23: normal.     Labs January 2024: CBC normal except white blood cell count 16.6, CMP except sodium 133, 25-hydroxy vitamin D 22, CRP less than 0.1  Labs June 2024: Cholesterol 178, HDL 65, LDL 81, triglycerides 161, ESR 12, CBC normal except white blood cell count 12.1, CMP normal except glucose 114     She received Reclast 10/13, 11/14, and 12/15.  DEXA 9/17: T score -3.2 femoral neck (decline of 4.8%), T score -3.0 total hip, T score -1.9 lumbar spine.   Received reclast 4/18 (because of loss of BMD 9/17), 7/19.    DEXA 10/19: T score -3.3 femoral neck (stable), T score -3.4 total hip (decline of 7.3%), T score -2.6 lumbar spine (decline of 8.5%)  DEXA December 2021: T score -2.8 left femoral neck (increased 13.2%), T score -3.2 left total hip (increased 4.3%), T score -2.3 lumbar spine (increased 3.6%)  DEXA 12/23: T-score -3.4 right total hip, T-score -2.9 right femoral neck, T-score -1.9 LS spine (increased 5.4%)     Health maintenance:   Mammograms have been refused.   Colonoscopy 5/18- needs 3 year follow-up because of serrated polyps. Discussed again 4/09/24, 7/16/24, 10/17/24.   EGD 3/18: 3 cm hiatal hernia, erosive gastritis, ring in distal 1/3 of esophagus (dilated)..   EGD 3/19: biopsies negative for Reyes's esophagus.   Pneumovax 2005, 5/21   Prevnar 13 1/18   Moderna COVID-19 vaccine March 3, 2021, April 3, 2021. Received booster 10/20/21 and 6/22. BV booster was given 10/06/22.    eye exam 1/16    "QuantiFERON-TB Gold negative 8/18  T spot negative 5/21   Low dose chest CT (for lung cancer screening) 7/24-needs 1 year follow-up.     Medical problem list:   - RA (, )- previously failed MTX.   Took MTX from 1619-0688 (not certain why it was stopped).   Took Humira from 3801-5793.   Changed to remicade 2012 (because of worsening of psoriasis).      - psoriatic arthritis-    - Essential hypertension   - h/o CPPD arthropathy knee 1/09   - Cardiac calcium score 188 March 2020   - Osteoporosis- took Forteo [id number].    Started reclast 2010-had infusions October 2013, November 2014, December 2015, May 2018, July 2019.    Started Prolia 10/20 (due to loss of BMD and hip on DEXA October 2019.    - h/o bilateral sacral insufficiency fractures 2/21   - h/o left hip fracture 9/23   - h/o erosive esophagitis with recurrent strictures   - h/o serrated colon polyps   - left clavicle fracture     ROS:  General: Denies fevers or chills.  CV: Denies chest pain or palpitations.  Denies leg edema.  Lungs: Denies coughing or shortness of breath.  Skin: Denies rashes or nodules.  MS: Complains of right knee pain off-and-on.  Gets episodic low back pain.    Objective   /82 (BP Location: Left arm, Patient Position: Sitting, BP Cuff Size: Adult)   Pulse 92   Temp 36.9 °C (98.4 °F) (Skin)   Resp 16   Ht 1.626 m (5' 4\")   Wt 50.5 kg (111 lb 6.4 oz)   SpO2 98%   BMI 19.12 kg/m²     Physical Exam  General Appearance: No acute distress.  HEENT: PERRL, EOMI  Neck: Supple, no nodes.  CV: RRR, no MGR.  Lungs: Clear, no rales or wheezes.  Abdomen: Soft, nontender. No hepatosplenomegaly.  Extremities:  No cyanosis, clubbing, or edema.  MS: Swollen: 0         Tender: 0         Patient global: 4         Evaluator global: 4          CDAI: 8 (low disease activity)            Skin: Has hyperpigmentation in pretibial area of left leg, where she had previous psoriatic plaques.      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 RA/ psoriatic arthritis- overall stable .  She is currently low disease activity by CDAI. She did not tolerate trying to reduce prednisone to 2.5 mg daily a few months ago , currently is back on 5 mg daily . Continue Remicade 6 mg/kg IV every 6 weeks.  Right knee was injected 11/23 with Kenalog.     2. Hypertension- at goal.      3. Osteoporosis- Pat fractures include left hip 9/23 and bilateral sacral insufficiency fractures 2/21.  Took forteo 2008- 2010.   Received Reclast October 2013, November 2014, December 2015, then again May 2018 (because of loss of BMD) and 7/19. DEXA 10/19 shows loss of BMD in hip..   Started prolia 10/20. Last dose was 6/13/24.. next dose is due 12/13/24.  DEXA done December 2023 shows T-score -2.9 right femoral neck, T-score -3.4 right total hip, T-score -1.9 lumbar spine.  Bone density  has increased 5.4% in the lumbar spine. Since right hip was used, cannot do comparison.  Next prolia was due 12/13/24. She was told to call infusion center to schedule.   Next DEXA will be due 12/25.      4. History of erosive gastritis- continue Nexium. She had a recent episode of food sticking 7/21-I recommend that she follow-up with Dr. Cm to consider repeat EGD in case she needs to be dilated again. She is also due for her 3-year follow-up colonoscopy because of serrated polyps in March 2018.  This was discussed again in April 9, 2024 and 7/16/24 .  She was referred to GI for  colonoscopy (discussed again 10/17/24).     5. . Cardiac calcium score 188 March 2020-she stopped atorvastatin 80 mg because she thought it was affecting her cognition.  I had her resume atorvastatin 20 mg daily.     6. Lung cancer screening-low-dose chest CT done 7/24,  needs 1 year follow-up.     8. Advanced Care planning-Discussed 7/16/24. she has a living well. HPOA is  [place]. She is full code.     9. BMI 19.1-slowly improving since she left the rehab facility.    10.  Medicare wellness visit- done 7/16/24.    Plan:  Please scheduled Prolia injection.  Check CMP, 25-hydroxy Vitamin D.  Follow-up in 3 months.

## 2025-01-21 LAB
25(OH)D3 SERPL-MCNC: 14 NG/ML (ref 30–100)
ALBUMIN SERPL BCP-MCNC: 4.4 G/DL (ref 3.4–5)
ALP SERPL-CCNC: 56 U/L (ref 33–136)
ALT SERPL W P-5'-P-CCNC: 7 U/L (ref 7–45)
ANION GAP SERPL CALC-SCNC: 15 MMOL/L (ref 10–20)
AST SERPL W P-5'-P-CCNC: 12 U/L (ref 9–39)
BILIRUB SERPL-MCNC: 0.4 MG/DL (ref 0–1.2)
BUN SERPL-MCNC: 19 MG/DL (ref 6–23)
CALCIUM SERPL-MCNC: 10.2 MG/DL (ref 8.6–10.6)
CHLORIDE SERPL-SCNC: 94 MMOL/L (ref 98–107)
CO2 SERPL-SCNC: 29 MMOL/L (ref 21–32)
CREAT SERPL-MCNC: 0.81 MG/DL (ref 0.5–1.05)
EGFRCR SERPLBLD CKD-EPI 2021: 77 ML/MIN/1.73M*2
GLUCOSE SERPL-MCNC: 80 MG/DL (ref 74–99)
POTASSIUM SERPL-SCNC: 4.4 MMOL/L (ref 3.5–5.3)
PROT SERPL-MCNC: 7.3 G/DL (ref 6.4–8.2)
SODIUM SERPL-SCNC: 134 MMOL/L (ref 136–145)

## 2025-01-22 ENCOUNTER — APPOINTMENT (OUTPATIENT)
Dept: INFUSION THERAPY | Facility: CLINIC | Age: 70
End: 2025-01-22
Payer: MEDICARE

## 2025-01-22 VITALS
BODY MASS INDEX: 19.15 KG/M2 | TEMPERATURE: 97.2 F | RESPIRATION RATE: 16 BRPM | DIASTOLIC BLOOD PRESSURE: 78 MMHG | HEART RATE: 86 BPM | WEIGHT: 111.55 LBS | OXYGEN SATURATION: 97 % | SYSTOLIC BLOOD PRESSURE: 114 MMHG

## 2025-01-22 DIAGNOSIS — M05.762 RHEUMATOID ARTHRITIS INVOLVING BOTH KNEES WITH POSITIVE RHEUMATOID FACTOR (MULTI): ICD-10-CM

## 2025-01-22 DIAGNOSIS — M05.761 RHEUMATOID ARTHRITIS INVOLVING BOTH KNEES WITH POSITIVE RHEUMATOID FACTOR (MULTI): ICD-10-CM

## 2025-01-22 PROCEDURE — 96413 CHEMO IV INFUSION 1 HR: CPT | Performed by: REGISTERED NURSE

## 2025-01-22 RX ORDER — ALBUTEROL SULFATE 0.83 MG/ML
3 SOLUTION RESPIRATORY (INHALATION) AS NEEDED
OUTPATIENT
Start: 2025-02-27

## 2025-01-22 RX ORDER — DIPHENHYDRAMINE HYDROCHLORIDE 50 MG/ML
50 INJECTION INTRAMUSCULAR; INTRAVENOUS AS NEEDED
OUTPATIENT
Start: 2025-02-27

## 2025-01-22 RX ORDER — FAMOTIDINE 10 MG/ML
20 INJECTION INTRAVENOUS ONCE AS NEEDED
OUTPATIENT
Start: 2025-02-27

## 2025-01-22 RX ORDER — EPINEPHRINE 0.3 MG/.3ML
0.3 INJECTION SUBCUTANEOUS EVERY 5 MIN PRN
OUTPATIENT
Start: 2025-02-27

## 2025-01-22 ASSESSMENT — ENCOUNTER SYMPTOMS
DYSURIA: 0
DIARRHEA: 0
WOUND: 0
EXTREMITY WEAKNESS: 0
FEVER: 0
BLOOD IN STOOL: 0
SHORTNESS OF BREATH: 0
DIZZINESS: 0
EYE PROBLEMS: 0
COUGH: 0
SORE THROAT: 0
CHILLS: 0
ARTHRALGIAS: 0
FREQUENCY: 0
FATIGUE: 0
HEMATURIA: 0
UNEXPECTED WEIGHT CHANGE: 0
MYALGIAS: 0
NUMBNESS: 0
WHEEZING: 0
NAUSEA: 0
BRUISES/BLEEDS EASILY: 1
APPETITE CHANGE: 0
LIGHT-HEADEDNESS: 0
PALPITATIONS: 0
ABDOMINAL PAIN: 0
HEADACHES: 0
VOMITING: 0
TROUBLE SWALLOWING: 0
LEG SWELLING: 0
VOICE CHANGE: 0
CONSTIPATION: 0

## 2025-01-22 NOTE — PROGRESS NOTES
[facility]   Infusion Clinic Note   Date: 2025   Name: Tatiana Hagan  : 1955   MRN: [id number]          Reason for Visit: OP Infusion (PATIENT HERE FOR HER REMICADE 400MG INFUSION Q6 WEEKS )         Today: We administered inFLIXimab (Remicade) 400 mg in sodium chloride 0.9% 250 mL IV.       Visit Type: INFUSION       Ordered By: CARMEN FAM MD       Accompanied by:Self       Diagnosis: Rheumatoid arthritis involving both knees with positive rheumatoid factor (Multi)        Allergies:   Allergies as of 2025 - Reviewed 2025   Allergen Reaction Noted    Nsaids (non-steroidal anti-inflammatory drug) Other 2023    Aspirin Other 2023    Penicillins Rash 2023    Sulfa (sulfonamide antibiotics) Nausea And Vomiting 2020          Current Medications has a current medication list which includes the following prescription(s): acetaminophen, atorvastatin, betamethasone dipropionate, denosumab, denosumab, esomeprazole, hydrochlorothiazide, infliximab, lisinopril, and prednisone, and the following Facility-Administered Medications: denosumab.       Vitals:   Vitals:    25 1410 25 1535   BP: 116/78 114/78   Pulse: 90 86   Resp: 16 16   Temp: 36.2 °C (97.2 °F) 36.2 °C (97.2 °F)   SpO2: 96% 97%   Weight: 50.6 kg (111 lb 8.8 oz)              Infusion Pre-procedure Checklist:   - Allergies reviewed: yes   - Medications reviewed: yes       - Previous reaction to current treatment: no      Assess patient for the concerns below. Document provider notification as appropriate.  - Active or recent infection with/without current antibiotic use: no  - Recent or planned invasive dental work: no  - Recent or planned surgeries: no  - Recently received or plans to receive vaccinations: no  - Has treatment related toxicities: no  - Any chance you may be pregnant:  n/a      Pain: 0   - Is the pain different from normal: n/a   - Is your Doctor aware:   n/a       Labs: N/A          Fall Risk Screening:         Review Of Systems:  Review of Systems   Constitutional:  Negative for appetite change, chills, fatigue, fever and unexpected weight change.   HENT:   Negative for hearing loss, mouth sores, sore throat, tinnitus, trouble swallowing and voice change.    Eyes:  Negative for eye problems.   Respiratory:  Negative for cough, shortness of breath and wheezing.    Cardiovascular:  Negative for chest pain, leg swelling and palpitations.   Gastrointestinal:  Negative for abdominal pain, blood in stool, constipation, diarrhea, nausea and vomiting.   Genitourinary:  Negative for dysuria, frequency and hematuria.    Musculoskeletal:  Negative for arthralgias and myalgias.   Skin:  Negative for itching, rash and wound.   Neurological:  Negative for dizziness, extremity weakness, headaches, light-headedness and numbness.   Hematological:  Bruises/bleeds easily.         ROS completed? Yes      Infusion Readiness:  - Assessment Concerns Related to Infusion: No  - Provider notified: n/a      Document Below Only If Indicated:   New Patient Education:    N/A (returning patient for continuation of therapy. Ongoing education provided as needed.)        Treatment Conditions & Drug Specific Questions:    InFLIXimab  (AVSOLA, INFLECTRA, REMICADE, RENFLEXIS)    (Unless otherwise specified on patient specific therapy plan):     TREATMENT CONDITIONS:  Unless otherwise specified on patient specific therapy plan HOLD and notify Provider prior to proceeding with today's infusion if patient with:  o Positive T-Spot  o Reactive Hep B sAg and/or Hep B Core Antibody    Lab Results   Component Value Date    TBSIN Negative 06/13/2022    QFG NEGATIVE 08/28/2018      Labs reviewed and patient meets treatment conditions? Yes    DRUG SPECIFIC QUESTIONS:    Any new or worsening signs / symptoms of heart failure which may include things like worsening shortness of breath, swelling, fatigue? No   (If  yes notify provider before proceeding with today's infusion. New onset or worsening HF have been reported with infliximab)    REMINDER:   WEIGHT BASED DRUG     Recommended Vitals/Observation:  Vitals:     Induction: Obtain vital signs every 30 minutes; at end of observation period and as needed.     Maintenance: Obtain vital signs at start and end of infusions  Observation:     Induction: Patient is monitored for 30 minutes post-infusion      Maintenance: No observation.        Weight Based Drug Calculations:    WEIGHT BASED DRUGS: Infliximab (REMICADE, INFLECTRA, RENFLEXIS)   Patient's dosing weight (kg): 49.9kg     10% weight variance for prescribed treatment: 44.9 kg to 54.9 kg     Patient's weight today:   Vitals:    01/22/25 1410   Weight: 50.6 kg (111 lb 8.8 oz)         weight range for prescribed dose:     Patient weight today falls outside of 10% variance or  weight range: No     Home Care pharmacist informed of weight variance: Not applicable    Doses that are weight based have an acceptable variance rule within 10% of the prescribed   order and/or within  weight range. If patient weight on day of infusion falls   outside of the 10% variance, or weight range, infusion is administered and   pharmacy contacted regarding future dosing adjustments, per policy.         Initiated By: Madhu Alcala RN

## 2025-01-22 NOTE — PATIENT INSTRUCTIONS
Today you received:  REMICADE 400MG INFUSION Q6 WEEKS       For:    1. Rheumatoid arthritis involving both knees with positive rheumatoid factor (Multi)            Please read the  Medication Guide that was given to you and reviewed during todays visit.     (Tell all doctors including dentists that you are taking this medication)     Go to the emergency room or call 911 if:  -You have signs of allergic reaction:   o         Rash, hives, itching.   o         Swollen, blistered, peeling skin.   o         Swelling of face, lips, mouth, tongue or throat.   o         Tightness of chest, trouble breathing, swallowing or talking      Call your doctor:     - If IV / injection site gets red, warm, swollen, itchy or leaks fluid or pus.     (Leave dressing on your IV site for at least 2 hours and keep area clean and dry    - If you get sick or have symptoms of infection or are not feeling well for any reason.    (Wash your hands often, stay away from people who are sick)    - If you have side effects from your medication that do not go away or are bothersome.     (Refer to the teaching your nurse gave you for side effects to call your doctor about)     Common side effects may include:  stuffy nose, headache, feeling tired, muscle aches, upset stomach    - Before receiving any vaccines, Call the [facility] at [phone]     - You get sick, are on antibiotics, have had a recent vaccine, have surgery or dental work and your doctor wants your visit rescheduled.    - You need to cancel and reschedule your visit for any reason. Call at least 2 days before your visit if you need to cancel.     - Your insurance changes before your next visit.    (We will need to get approval from your new insurance. This can take up to two weeks.)     The [facility] is opened Monday thru Friday 8am-8pm and Saturday 8am-4:30pm. We are closed on holidays.     Voice mail will take your call if the center is closed. If  you leave a message please allow 24 hours for a call back during weekdays. If you leave a message on a weekend/holiday, we will call you back the next business day.

## 2025-01-23 ENCOUNTER — SPECIALTY PHARMACY (OUTPATIENT)
Dept: PHARMACY | Facility: CLINIC | Age: 70
End: 2025-01-23

## 2025-01-23 ENCOUNTER — DOCUMENTATION (OUTPATIENT)
Dept: INFUSION THERAPY | Facility: CLINIC | Age: 70
End: 2025-01-23
Payer: MEDICARE

## 2025-01-23 NOTE — PROGRESS NOTES
"CLINICAL CLEARANCE FOR OUTPATIENT INJECTION      Patient to be scheduled for Continuation of Prolia injections.    For Diagnosis: Osteoporosis    Labs required prior to treatment (place lab orders if not already ordered or completed):  Calcium drawn on:   Lab Results   Component Value Date    CALCIUM 10.2 01/20/2025    PHOS 3.1 09/29/2023      Lab Results   Component Value Date    ALBUMIN 4.4 01/20/2025     No results found for: \"CAION\"   Calcium >8.6? Yes   (CA must be >8.6mg/dl or ionized calcium WNL.  Hold / Contact provider if <8.6) (must be within 28 days of scheduled injection)    Lab Results   Component Value Date    EGFR 77 01/20/2025        eGFR: 77  (Patients with a eGFR <30 are at increased risk of hypocalcemia)      *If eGFR less than 30 reach out to prescribing provider to discuss need for frequent lab monitoring and supplementation. Recommendation is to Monitor serum calcium weekly for the first month after initiation and then at least monthly thereafter due to increased risk for hypocalcemia.    Calcium and Vitamin D supplement on medication list? No    Current Outpatient Medications:     acetaminophen (Tylenol 8 HOUR) 650 mg ER tablet, Take 2 tablets (1,300 mg) by mouth every 8 hours if needed for mild pain (1 - 3)., Disp: , Rfl:     atorvastatin (Lipitor) 20 mg tablet, Take 1 tablet (20 mg) by mouth once daily., Disp: 90 tablet, Rfl: 1    betamethasone dipropionate (Diprosone) 0.05 % ointment, Apply 1 Application topically once daily as needed for rash., Disp: 15 g, Rfl: 2    denosumab (Prolia) 60 mg/mL syringe, Inject 1 mL (60 mg total) under the skin every 6 months., Disp: , Rfl:     denosumab (Prolia) 60 mg/mL syringe, Inject 1 mL (60 mg total) under the skin every 6 months., Disp: 1 each, Rfl: 1    esomeprazole (NexIUM) 20 mg DR capsule, Take 1 capsule (20 mg) by mouth once daily in the morning. Take before meals. Do not open capsule., Disp: , Rfl:     hydroCHLOROthiazide (HYDRODiuril) 25 mg " tablet, Take 1 tablet (25 mg) by mouth once daily., Disp: 90 tablet, Rfl: 1    inFLIXimab (Remicade) 100 mg injection, Infuse 6mg/kg (400mg) IV once every 6 weeks, Disp: 4 each, Rfl: 5    lisinopril 5 mg tablet, Take 1 tablet (5 mg) by mouth once daily., Disp: 90 tablet, Rfl: 1    predniSONE (Deltasone) 5 mg tablet, Take 1 tablet (5 mg) by mouth once daily., Disp: 90 tablet, Rfl: 1    Current Facility-Administered Medications:     denosumab (Prolia) injection 60 mg, 60 mg, subcutaneous, q6 months, Shauna Bolanos MD, 60 mg at 06/13/24 1330   (if no nurse to encourage discussion of supplementation at visit)    No obvious recent dental work per chart review. Nurse to confirm no dental within past/next 4 weeks at encounter.    Urine Hcg test ordered prior to first injection?Not applicable (If female pt <60 years of age and with reproductive capability)  (If not ordered and is indicated place order)    Last injection received: 6/13/2024 (if continuation)    Due: Anytime     Ok to schedule for prolia within 28 days of the calcium lab draw date listed above

## 2025-03-05 ENCOUNTER — TELEPHONE (OUTPATIENT)
Dept: PRIMARY CARE | Facility: CLINIC | Age: 70
End: 2025-03-05

## 2025-03-05 ENCOUNTER — TELEMEDICINE (OUTPATIENT)
Dept: RHEUMATOLOGY | Facility: CLINIC | Age: 70
End: 2025-03-05
Payer: MEDICARE

## 2025-03-05 ENCOUNTER — APPOINTMENT (OUTPATIENT)
Dept: INFUSION THERAPY | Facility: CLINIC | Age: 70
End: 2025-03-05
Payer: MEDICARE

## 2025-03-05 DIAGNOSIS — M05.79 RHEUMATOID ARTHRITIS INVOLVING MULTIPLE SITES WITH POSITIVE RHEUMATOID FACTOR (MULTI): Primary | ICD-10-CM

## 2025-03-05 DIAGNOSIS — L40.59 POLYARTICULAR PSORIATIC ARTHRITIS (MULTI): ICD-10-CM

## 2025-03-05 DIAGNOSIS — M81.0 AGE RELATED OSTEOPOROSIS, UNSPECIFIED PATHOLOGICAL FRACTURE PRESENCE: ICD-10-CM

## 2025-03-05 DIAGNOSIS — J01.10 ACUTE NON-RECURRENT FRONTAL SINUSITIS: ICD-10-CM

## 2025-03-05 PROCEDURE — 1159F MED LIST DOCD IN RCRD: CPT | Performed by: INTERNAL MEDICINE

## 2025-03-05 PROCEDURE — 99213 OFFICE O/P EST LOW 20 MIN: CPT | Performed by: INTERNAL MEDICINE

## 2025-03-05 PROCEDURE — 1160F RVW MEDS BY RX/DR IN RCRD: CPT | Performed by: INTERNAL MEDICINE

## 2025-03-05 RX ORDER — DOXYCYCLINE 100 MG/1
100 TABLET ORAL 2 TIMES DAILY
Qty: 14 TABLET | Refills: 0 | Status: SHIPPED | OUTPATIENT
Start: 2025-03-05 | End: 2025-03-12

## 2025-03-05 RX ORDER — FLUTICASONE PROPIONATE 50 MCG
2 SPRAY, SUSPENSION (ML) NASAL DAILY
Qty: 16 G | Refills: 0 | Status: SHIPPED | OUTPATIENT
Start: 2025-03-05 | End: 2026-03-05

## 2025-03-05 NOTE — PATIENT INSTRUCTIONS
Recommend doxycycline 100 mg twice daily for 7 days.  Recommend fluticasone nasal spray 1 to 2 sprays each nostril once daily.  Call back if not better in 1 week.  Please call the infusion center to schedule your Prolia injection, which was due December 2024.  There is a rebound increased risk of bone fractures if Prolia is delayed.  I also recommend that you start vitamin D 2000 international units daily, because your vitamin D level is low.

## 2025-03-05 NOTE — PROGRESS NOTES
Subjective   Patient ID: Tatiana Hagan is a 70 y.o. female who presents for symptoms of nasal congestion for 1 week.   Virtual visit is being done today.  Verbal consent was given.  Patient is at home at the time of the visit.    HPI 70 -year-old woman with history of overlap of rheumatoid arthritis and psoriatic arthritis , history of osteoporosis and CPPD arthropathy , who is here with complaints of nasal congestion for 1 week.  Her head feels very blocked.  She is not having discolored nasal drainage.  She denies fever or chills.  She denies cough.  She continues to smoke 1 pack/day.  She denies sick contacts.     The patient fell and fractured her left hip September 29, 2023.  Her leg is now feeling better and she is ambulating without assistance.    She also tripped and fell over her son's wheelchair August 22, 2024.  She had a fracture of her distal left clavicle which is now healed.  She can raise her arm above her head again.    She lost significant weight while in the rehab center . She is slowly regaining weight she does state that her appetite is good..  Current weight is 111 pounds with BMI 19.1.    Prolia injection was given 6/13/24.  Next was due 12/13/24- not yet scheduled.     She had Kenalog injection of her right knee with good relief  11/29/23.    She remains on Remicade 6 mg/kg (400 mg total) IV every 6 weeks, prednisone 5 mg daily. She tried to taper her prednisone to 2.5 mg daily a few months ago but was unable to tolerate the taper.     Her psoriasis is stable.     She did have a cardiac calcium score 3/20 that was 188. Dr. Mo prescribed atorvastatin 80 mg daily.  However, she tells me that she stopped the atorvastatin shortly after starting it because she thought it was affecting her cognition (I was not aware of this).  Lipids done June 2024, on no statin therapy, showed cholesterol 178 and LDL 81.  ASCVD risk score is 22.8%.  She resumed atorvastatin 20 mg daily.     She continues to  smoke 1 pack/day which she has done for 50 years. She is not currently interested in quitting smoking- this was discussed July 16, 2024.    She has a history of erosive esophagitis and esophageal strictures. Her last EGD and dilation was 3/19 with Dr. Cm.  Biopsies were negative for Reyes's esophagus.    She has a history of serrated colon polyps.  Her last colonoscopy was May 2018, she was due for follow-up May 2021.  This was discussed again April 9, 2024 but she did not want to follow-up at that time.  I have again recommended that she do follow-up colonoscopy, which she is now agreeable to .  This was discussed 7/16/24 and 10/17/24. Order has been placed.    She started Prolia 10/20.      EKG 9/23: normal.     Labs January 2024: CBC normal except white blood cell count 16.6, CMP except sodium 133, 25-hydroxy vitamin D 22, CRP less than 0.1  Labs June 2024: Cholesterol 178, HDL 65, LDL 81, triglycerides 161, ESR 12, CBC normal except white blood cell count 12.1, CMP normal except glucose 114  Labs 1/25: 25-hydroxy vitamin D 14, CMP normal except sodium 134     She received Reclast 10/13, 11/14, and 12/15.  DEXA 9/17: T score -3.2 femoral neck (decline of 4.8%), T score -3.0 total hip, T score -1.9 lumbar spine.   Received reclast 4/18 (because of loss of BMD 9/17), 7/19.    DEXA 10/19: T score -3.3 femoral neck (stable), T score -3.4 total hip (decline of 7.3%), T score -2.6 lumbar spine (decline of 8.5%)  DEXA December 2021: T score -2.8 left femoral neck (increased 13.2%), T score -3.2 left total hip (increased 4.3%), T score -2.3 lumbar spine (increased 3.6%)  DEXA 12/23: T-score -3.4 right total hip, T-score -2.9 right femoral neck, T-score -1.9 LS spine (increased 5.4%)     Health maintenance:   Mammograms have been refused.   Colonoscopy 5/18- needs 3 year follow-up because of serrated polyps. Discussed again 4/09/24, 7/16/24, 10/17/24.   EGD 3/18: 3 cm hiatal hernia, erosive gastritis, ring in distal  1/3 of esophagus (dilated)..   EGD 3/19: biopsies negative for Reyes's esophagus.   Pneumovax 2005, 5/21   Prevnar 13 1/18   Moderna COVID-19 vaccine March 3, 2021, April 3, 2021. Received booster 10/20/21 and 6/22. BV booster was given 10/06/22.    eye exam 1/16   QuantiFERON-TB Gold negative 8/18  T spot negative 5/21   Low dose chest CT (for lung cancer screening) 7/24-needs 1 year follow-up.     Medical problem list:   - RA (, )- previously failed MTX.   Took MTX from 2297-0079 (not certain why it was stopped).   Took Humira from 1943-2602.   Changed to remicade 2012 (because of worsening of psoriasis).      - psoriatic arthritis-    - Essential hypertension   - h/o CPPD arthropathy knee 1/09   - Cardiac calcium score 188 March 2020   - Osteoporosis- took Forteo [id number].    Started reclast 2010-had infusions October 2013, November 2014, December 2015, May 2018, July 2019.    Started Prolia 10/20 (due to loss of BMD and hip on DEXA October 2019.    - h/o bilateral sacral insufficiency fractures 2/21   - h/o left hip fracture 9/23   - h/o erosive esophagitis with recurrent strictures   - h/o serrated colon polyps   - left clavicle fracture     ROS:  General: Denies fevers or chills.  CV: Denies chest pain or palpitations.  Denies leg edema.  Lungs: Denies coughing or shortness of breath.  Skin: Denies rashes or nodules.  MS: Complains of right knee pain off-and-on.  Gets episodic low back pain.    Objective   There were no vitals taken for this visit.    Physical Exam    HEENT: External exam of the eyes and ears is normal.  CV: No apparent edema.  Lungs: Normal respiratory effort.  Neuro: Affect appropriate.        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 RA/ psoriatic arthritis- overall stable .  She is currently low disease activity by CDAI. She did not tolerate trying to reduce prednisone to 2.5 mg daily a few months ago , currently is back on 5 mg daily . Continue Remicade 6 mg/kg IV every 6 weeks.  Right knee was injected 11/23 with Kenalog.     2. Hypertension- at goal.      3. Osteoporosis- Pat fractures include left hip 9/23 and bilateral sacral insufficiency fractures 2/21.  Took forteo 2008- 2010.   Received Reclast October 2013, November 2014, December 2015, then again May 2018 (because of loss of BMD) and 7/19. DEXA 10/19 shows loss of BMD in hip..   Started prolia 10/20. Last dose was 6/13/24.. next dose is due 12/13/24.  DEXA done December 2023 shows T-score -2.9 right femoral neck, T-score -3.4 right total hip, T-score -1.9 lumbar spine.  Bone density  has increased 5.4% in the lumbar spine. Since right hip was used, cannot do comparison.  Next prolia was due 12/13/24. She was told again today  to call infusion center to schedule.   Next DEXA will be due 12/25.      4. History of erosive gastritis- continue Nexium. She had a recent episode of food sticking 7/21-I recommend that she follow-up with Dr. Cm to consider repeat EGD in case she needs to be dilated again. She is also due for her 3-year follow-up colonoscopy because of serrated polyps in March 2018.  This was discussed again in April 9, 2024 and 7/16/24 .  She was referred to GI for colonoscopy (discussed again 10/17/24).     5. . Cardiac calcium score 188 March 2020-she stopped atorvastatin 80 mg because she thought it was affecting her cognition.  I had her resume atorvastatin 20 mg daily.     6. Lung cancer screening-low-dose chest CT done 7/24,  needs 1 year follow-up.     8. Advanced Care planning-Discussed 7/16/24. she has a living well. HPOA is  [place]. She  is full code.     9. BMI 19.1-slowly improving since she left the rehab facility.    10.  Medicare wellness visit- done 7/16/24.    11. Sinusitis-recommend doxycycline 100 mg twice daily for 7 days and fluticasone nasal spray.  Call back if not better in 1 week.    Plan:  Recommend doxycycline 100 mg twice daily for 7 days.  Recommend fluticasone nasal spray 1 to 2 sprays each nostril once daily.  Call back if not better in 1 week.  Please call the infusion center to schedule your Prolia injection, which was due December 2024.  There is a rebound increased risk of bone fractures if Prolia is delayed.  I also recommend that you start vitamin D 2000 international units daily, because your vitamin D level is low.

## 2025-03-05 NOTE — TELEPHONE ENCOUNTER
Patient states that she has been dealing with congestion and sinus pressure for over a week, as well chest congestion.     Patient wants to know what she can do to help relieve symptoms? She has tried OTC products and they aren't working.

## 2025-03-26 DIAGNOSIS — M81.0 AGE-RELATED OSTEOPOROSIS WITHOUT CURRENT PATHOLOGICAL FRACTURE: Primary | ICD-10-CM

## 2025-03-26 DIAGNOSIS — M81.0 AGE-RELATED OSTEOPOROSIS WITHOUT CURRENT PATHOLOGICAL FRACTURE: ICD-10-CM

## 2025-04-05 DIAGNOSIS — J01.10 ACUTE NON-RECURRENT FRONTAL SINUSITIS: ICD-10-CM

## 2025-04-07 RX ORDER — FLUTICASONE PROPIONATE 50 MCG
2 SPRAY, SUSPENSION (ML) NASAL DAILY
Qty: 16 ML | Refills: 2 | Status: SHIPPED | OUTPATIENT
Start: 2025-04-07

## 2025-04-07 NOTE — TELEPHONE ENCOUNTER
Patient is requesting a refill on Flonase 50 mcg/actuation nasal spray    Last office visit: 03/05/2025

## 2025-04-15 ENCOUNTER — LAB (OUTPATIENT)
Dept: LAB | Facility: HOSPITAL | Age: 70
End: 2025-04-15
Payer: MEDICARE

## 2025-04-15 LAB
ALBUMIN SERPL BCP-MCNC: 4.4 G/DL (ref 3.4–5)
ALP SERPL-CCNC: 60 U/L (ref 33–136)
ALT SERPL W P-5'-P-CCNC: 7 U/L (ref 7–45)
ANION GAP SERPL CALC-SCNC: 13 MMOL/L (ref 10–20)
AST SERPL W P-5'-P-CCNC: 14 U/L (ref 9–39)
BILIRUB SERPL-MCNC: 0.4 MG/DL (ref 0–1.2)
BUN SERPL-MCNC: 21 MG/DL (ref 6–23)
CALCIUM SERPL-MCNC: 9.7 MG/DL (ref 8.6–10.3)
CHLORIDE SERPL-SCNC: 96 MMOL/L (ref 98–107)
CO2 SERPL-SCNC: 28 MMOL/L (ref 21–32)
CREAT SERPL-MCNC: 0.91 MG/DL (ref 0.5–1.05)
EGFRCR SERPLBLD CKD-EPI 2021: 67 ML/MIN/1.73M*2
GLUCOSE SERPL-MCNC: 75 MG/DL (ref 74–99)
POTASSIUM SERPL-SCNC: 4.6 MMOL/L (ref 3.5–5.3)
PROT SERPL-MCNC: 7.3 G/DL (ref 6.4–8.2)
SODIUM SERPL-SCNC: 132 MMOL/L (ref 136–145)

## 2025-04-15 PROCEDURE — 80053 COMPREHEN METABOLIC PANEL: CPT

## 2025-04-16 ENCOUNTER — APPOINTMENT (OUTPATIENT)
Dept: INFUSION THERAPY | Facility: CLINIC | Age: 70
End: 2025-04-16
Payer: MEDICARE

## 2025-04-16 ENCOUNTER — TELEPHONE (OUTPATIENT)
Dept: PRIMARY CARE | Facility: CLINIC | Age: 70
End: 2025-04-16

## 2025-04-16 VITALS
OXYGEN SATURATION: 98 % | SYSTOLIC BLOOD PRESSURE: 126 MMHG | DIASTOLIC BLOOD PRESSURE: 58 MMHG | HEART RATE: 84 BPM | RESPIRATION RATE: 18 BRPM | TEMPERATURE: 97.3 F | BODY MASS INDEX: 19 KG/M2 | WEIGHT: 110.67 LBS

## 2025-04-16 DIAGNOSIS — M05.761 RHEUMATOID ARTHRITIS INVOLVING BOTH KNEES WITH POSITIVE RHEUMATOID FACTOR (MULTI): ICD-10-CM

## 2025-04-16 DIAGNOSIS — M81.0 AGE-RELATED OSTEOPOROSIS WITHOUT CURRENT PATHOLOGICAL FRACTURE: ICD-10-CM

## 2025-04-16 DIAGNOSIS — M05.762 RHEUMATOID ARTHRITIS INVOLVING BOTH KNEES WITH POSITIVE RHEUMATOID FACTOR (MULTI): ICD-10-CM

## 2025-04-16 PROCEDURE — 96372 THER/PROPH/DIAG INJ SC/IM: CPT | Performed by: REGISTERED NURSE

## 2025-04-16 PROCEDURE — 96413 CHEMO IV INFUSION 1 HR: CPT | Performed by: REGISTERED NURSE

## 2025-04-16 RX ORDER — DIPHENHYDRAMINE HYDROCHLORIDE 50 MG/ML
50 INJECTION, SOLUTION INTRAMUSCULAR; INTRAVENOUS AS NEEDED
OUTPATIENT
Start: 2025-05-28

## 2025-04-16 RX ORDER — EPINEPHRINE 0.3 MG/.3ML
0.3 INJECTION SUBCUTANEOUS EVERY 5 MIN PRN
OUTPATIENT
Start: 2025-09-23

## 2025-04-16 RX ORDER — EPINEPHRINE 0.3 MG/.3ML
0.3 INJECTION SUBCUTANEOUS EVERY 5 MIN PRN
OUTPATIENT
Start: 2025-05-28

## 2025-04-16 RX ORDER — ALBUTEROL SULFATE 0.83 MG/ML
3 SOLUTION RESPIRATORY (INHALATION) AS NEEDED
Status: DISCONTINUED | OUTPATIENT
Start: 2025-04-16 | End: 2025-04-16 | Stop reason: HOSPADM

## 2025-04-16 RX ORDER — FAMOTIDINE 10 MG/ML
20 INJECTION, SOLUTION INTRAVENOUS ONCE AS NEEDED
OUTPATIENT
Start: 2025-05-28

## 2025-04-16 RX ORDER — DIPHENHYDRAMINE HYDROCHLORIDE 50 MG/ML
50 INJECTION, SOLUTION INTRAMUSCULAR; INTRAVENOUS AS NEEDED
Status: DISCONTINUED | OUTPATIENT
Start: 2025-04-16 | End: 2025-04-16 | Stop reason: HOSPADM

## 2025-04-16 RX ORDER — DIPHENHYDRAMINE HYDROCHLORIDE 50 MG/ML
50 INJECTION, SOLUTION INTRAMUSCULAR; INTRAVENOUS AS NEEDED
OUTPATIENT
Start: 2025-09-23

## 2025-04-16 RX ORDER — FAMOTIDINE 10 MG/ML
20 INJECTION, SOLUTION INTRAVENOUS ONCE AS NEEDED
OUTPATIENT
Start: 2025-09-23

## 2025-04-16 RX ORDER — FAMOTIDINE 10 MG/ML
20 INJECTION, SOLUTION INTRAVENOUS ONCE AS NEEDED
Status: DISCONTINUED | OUTPATIENT
Start: 2025-04-16 | End: 2025-04-16 | Stop reason: HOSPADM

## 2025-04-16 RX ORDER — EPINEPHRINE 0.3 MG/.3ML
0.3 INJECTION SUBCUTANEOUS EVERY 5 MIN PRN
Status: DISCONTINUED | OUTPATIENT
Start: 2025-04-16 | End: 2025-04-16 | Stop reason: HOSPADM

## 2025-04-16 RX ORDER — ALBUTEROL SULFATE 0.83 MG/ML
3 SOLUTION RESPIRATORY (INHALATION) AS NEEDED
OUTPATIENT
Start: 2025-05-28

## 2025-04-16 RX ORDER — ALBUTEROL SULFATE 0.83 MG/ML
3 SOLUTION RESPIRATORY (INHALATION) AS NEEDED
OUTPATIENT
Start: 2025-09-23

## 2025-04-16 ASSESSMENT — ENCOUNTER SYMPTOMS
ARTHRALGIAS: 1
HEADACHES: 0
BLOOD IN STOOL: 0
ABDOMINAL PAIN: 0
LIGHT-HEADEDNESS: 0
SHORTNESS OF BREATH: 0
SORE THROAT: 0
APPETITE CHANGE: 0
FEVER: 0
VOMITING: 0
NUMBNESS: 0
MYALGIAS: 0
DYSURIA: 0
EYE PROBLEMS: 0
UNEXPECTED WEIGHT CHANGE: 0
WHEEZING: 0
HEMATURIA: 0
VOICE CHANGE: 0
PALPITATIONS: 0
DIARRHEA: 0
COUGH: 1
DIZZINESS: 0
TROUBLE SWALLOWING: 0
CONSTIPATION: 0
WOUND: 0
LEG SWELLING: 0
FREQUENCY: 0
FATIGUE: 0
NAUSEA: 0

## 2025-04-16 NOTE — PATIENT INSTRUCTIONS
Today :We administered inFLIXimab (Remicade) 400 mg in sodium chloride 0.9% 250 mL IV and denosumab.     For:   1. Age-related osteoporosis without current pathological fracture    2. Rheumatoid arthritis involving both knees with positive rheumatoid factor (Multi)         Your next appointment is due in:  PROLIA 6 MONTHS REMICADE 42 DAYS        Please read the  Medication Guide that was given to you and reviewed during todays visit.     (Tell all doctors including dentists that you are taking this medication)     Go to the emergency room or call 911 if:  -You have signs of allergic reaction:   -Rash, hives, itching.   -Swollen, blistered, peeling skin.   -Swelling of face, lips, mouth, tongue or throat.   -Tightness of chest, trouble breathing, swallowing or talking     Call your doctor:  - If IV / injection site gets red, warm, swollen, itchy or leaks fluid or pus.     (Leave dressing on your IV site for at least 2 hours and keep area clean and dry  - If you get sick or have symptoms of infection or are not feeling well for any reason.    (Wash your hands often, stay away from people who are sick)  - If you have side effects from your medication that do not go away or are bothersome.     (Refer to the teaching your nurse gave you for side effects to call your doctor about)    - Common side effects may include:  stuffy nose, headache, feeling tired, muscle aches, upset stomach  - Before receiving any vaccines     - Call the [facility] at   If:  - You get sick, are on antibiotics, have had a recent vaccine, have surgery or dental work and your doctor wants your visit rescheduled.  - You need to cancel and reschedule your visit for any reason. Call at least 2 days before your visit if you need to cancel.   - Your insurance changes before your next visit.    (We will need to get approval from your new insurance. This can take up to two weeks.)     The [facility] is opened Monday thru  Friday. We are closed on weekends and holidays.   Voice mail will take your call if the center is closed. If you leave a message please allow 24 hours for a call back during weekdays. If you leave a message on a weekend/holiday, we will call you back the next business day.    A pharmacist is available Monday - Friday from 8:30AM to 3:30PM to help answer any questions you may have about your prescriptions(s). Please call pharmacy at:    [facility]: [phone]  [facility]: [phone]  [facility]: [phone]

## 2025-04-16 NOTE — PROGRESS NOTES
[facility]   Infusion Clinic Note   Date: 2025   Name: Tatiana Hagan  : 1955   MRN: [id number]         Reason for Visit: OP Infusion (Remicade 400 mg  Prolia 60 mg)         Today: We administered inFLIXimab (Remicade) 400 mg in sodium chloride 0.9% 250 mL IV and denosumab.       Ordered By: Shauna Bolanos,*       For a Diagnosis of: Age-related osteoporosis without current pathological fracture    Rheumatoid arthritis involving both knees with positive rheumatoid factor (Multi)       At today's visit patient accompanied by: Self      Today's Vitals:   Vitals:    25 1411 25 1511   BP: 136/83 126/58   Pulse: 80 84   Resp: 17 18   Temp: 36.3 °C (97.3 °F)    SpO2: 97% 98%   Weight: 50.2 kg (110 lb 10.7 oz)              Pre - Treatment Checklist:      - Previous reaction to current treatment: no      (Assess patient for the concerns below. Document provider notification as appropriate).  - Active or recent infection with/without current antibiotic use: no  - Recent or planned invasive dental work: no  - Recent or planned surgeries: no  - Recently received or plans to receive vaccinations: no  - Has treatment related toxicities: no  - Any chance may be pregnant:  no      Pain: 0   - Is the pain different from normal: n/a   - Is prescribing Doctor aware:  n/a      Labs: Reviewed       Fall Risk Screening: Cheng Fall Risk  History of Falling, Immediate or Within 3 Months: No  Secondary Diagnosis: No  Ambulatory Aid: Walks without aid/bedrest/nurse assist  Intravenous Therapy/Heparin Lock: Yes  Gait/Transferring: Normal/bedrest/immobile  Mental Status: Oriented to own ability  Anand Fall Risk Score: 20       Review Of Systems:  Review of Systems   Constitutional:  Negative for appetite change, fatigue, fever and unexpected weight change.   HENT:   Negative for hearing loss, mouth sores, sore throat, trouble swallowing and voice change.    Eyes:  Negative for eye  "problems.   Respiratory:  Positive for cough. Negative for shortness of breath and wheezing.    Cardiovascular:  Negative for chest pain, leg swelling and palpitations.   Gastrointestinal:  Negative for abdominal pain, blood in stool, constipation, diarrhea, nausea and vomiting.   Genitourinary:  Negative for dysuria, frequency and hematuria.    Musculoskeletal:  Positive for arthralgias. Negative for myalgias.   Skin:  Negative for rash and wound.   Neurological:  Negative for dizziness, headaches, light-headedness and numbness.         Infusion Readiness:  - Assessment Concerns Related to Infusion: No  - Provider notified: no      New Patient Education:    N/A (returning patient for continuation of therapy. Ongoing education provided as needed.)        Treatment Conditions & Drug Specific Questions:    Denosumab  (PROLIA. XGEVA)    (Unless otherwise specified on patient specific therapy plan):     TREATMENT CONDITIONS:  Unless otherwise specified on patient specific therapy plan HOLD and notify provider prior to proceeding with today's injection if patients:  O Corrected or Serum Calcium LESS THAN 8.6 mg/dL  OR Ionized calcium less than 1.1 mmol/L or  less than 4.7 mg/dL (depending on resulting agency)  o Recent or planned invasive dental procedure (within 4 weeks)    Lab Results   Component Value Date    CALCIUM 9.7 04/15/2025    PHOS 3.1 09/29/2023      No results found for: \"CAION\"    Patient meets treatment conditions? Yes    DRUG SPECIFIC QUESTIONS:  Is the patient taking calcium and vitamin D? Yes  (Recommended)    Pt Instructed on following risks: (1) hypocalcemia, (2) osteonecrosis of the jaw, (3) atypical femoral fractures, (4) serious infections, and (5) dermatologic reactions?  Yes      REMINDER:  PREGNANCY CATEGORY X DRUG. OBTAIN NEGTATIVE PREGNANCY TEST PRIOR TO FIRST INFUSION FOR WOMEN OF CHILDBEARING ABILITY   REMS DRUG    Recommended Vitals/Observation:  Vitals: Obtain vitals prior to " "injection.  Observation: Patient may leave immediately following injection. and InFLIXimab  (AVSOLA, INFLECTRA, REMICADE, RENFLEXIS)    (Unless otherwise specified on patient specific therapy plan):     TREATMENT CONDITIONS:  Unless otherwise specified on patient specific therapy plan HOLD and notify Provider prior to proceeding with today's infusion if patient with:  o Positive T-Spot  o Reactive Hep B sAg and/or Hep B Core Antibody    Lab Results   Component Value Date    TBSIN Negative 06/13/2022    QFG NEGATIVE 08/28/2018      No results found for: \"HAGCN\", \"HEPBSURABI\", \"HBSAG\", \"XHAGF\", \"HEPBSAG\", \"EXTHEPBSAG\", \"NONUHSWGH\"   No results found for: \"NONUHFIRE\", \"NONUHSWGH\", \"NONUHFISH\", \"EXTHEPBSAG\"  No results found for: \"HBCTI\", \"HEPBCAB\"  No results found for: \"HEPATOT\", \"HEPAIGM\", \"HEPBCIGM\", \"HEPBCAB\", \"HBEAG\", \"HEPCAB\"     Patient meets treatment conditions? Yes    DRUG SPECIFIC QUESTIONS:    Any new or worsening signs / symptoms of heart failure which may include things like worsening shortness of breath, swelling, fatigue? No   (If yes notify provider before proceeding with today's infusion. New onset or worsening HF have been reported with infliximab)    REMINDER:   WEIGHT BASED DRUG     Recommended Vitals/Observation:  Vitals:     Induction: Obtain vital signs every 30 minutes; at end of observation period and as needed.     Maintenance: Obtain vital signs at start and end of infusions  Observation:     Induction: Patient is monitored for 30 minutes post-infusion      Maintenance: No observation.        Weight Based Drug Calculations:    WEIGHT BASED DRUGS: Infliximab (REMICADE, INFLECTRA, RENFLEXIS)   Patient's dosing weight (kg): 50     10% weight variance for prescribed treatment: 45 kg to 55 kg     Patient's weight today:   Vitals:    04/16/25 1411   Weight: 50.2 kg (110 lb 10.7 oz)         weight range for prescribed dose:     Patient weight today falls outside of 10% variance or "  weight range: No     Home Care pharmacist informed of weight variance: No    Doses that are weight based have an acceptable variance rule within 10% of the prescribed   order and/or within  weight range. If patient weight on day of infusion falls   outside of the 10% variance, or weight range, infusion is administered and   pharmacy contacted regarding future dosing adjustments, per policy.      Post Treatment: Patient tolerated treatment without issue and was discharged in no apparent distress.      Note Authored / Patient Cared for By: Bhavna Reid RN

## 2025-04-22 ENCOUNTER — APPOINTMENT (OUTPATIENT)
Dept: RHEUMATOLOGY | Facility: CLINIC | Age: 70
End: 2025-04-22
Payer: MEDICARE

## 2025-04-22 VITALS
DIASTOLIC BLOOD PRESSURE: 70 MMHG | BODY MASS INDEX: 19.12 KG/M2 | SYSTOLIC BLOOD PRESSURE: 130 MMHG | HEIGHT: 64 IN | OXYGEN SATURATION: 99 % | RESPIRATION RATE: 14 BRPM | HEART RATE: 64 BPM | WEIGHT: 112 LBS | TEMPERATURE: 97.3 F

## 2025-04-22 DIAGNOSIS — M05.79 RHEUMATOID ARTHRITIS INVOLVING MULTIPLE SITES WITH POSITIVE RHEUMATOID FACTOR (MULTI): ICD-10-CM

## 2025-04-22 DIAGNOSIS — K21.9 GASTROESOPHAGEAL REFLUX DISEASE WITHOUT ESOPHAGITIS: ICD-10-CM

## 2025-04-22 DIAGNOSIS — L40.9 PSORIASIS: ICD-10-CM

## 2025-04-22 DIAGNOSIS — I10 PRIMARY HYPERTENSION: ICD-10-CM

## 2025-04-22 DIAGNOSIS — Z87.891 PERSONAL HISTORY OF NICOTINE DEPENDENCE: ICD-10-CM

## 2025-04-22 DIAGNOSIS — R13.10 DYSPHAGIA, UNSPECIFIED TYPE: ICD-10-CM

## 2025-04-22 DIAGNOSIS — R93.1 AGATSTON CAC SCORE 100-199: ICD-10-CM

## 2025-04-22 DIAGNOSIS — L40.59 POLYARTICULAR PSORIATIC ARTHRITIS (MULTI): Primary | ICD-10-CM

## 2025-04-22 DIAGNOSIS — J01.10 ACUTE NON-RECURRENT FRONTAL SINUSITIS: ICD-10-CM

## 2025-04-22 DIAGNOSIS — Z72.0 TOBACCO USE: ICD-10-CM

## 2025-04-22 DIAGNOSIS — E78.5 HYPERLIPIDEMIA, UNSPECIFIED HYPERLIPIDEMIA TYPE: ICD-10-CM

## 2025-04-22 PROCEDURE — 1159F MED LIST DOCD IN RCRD: CPT | Performed by: INTERNAL MEDICINE

## 2025-04-22 PROCEDURE — 1125F AMNT PAIN NOTED PAIN PRSNT: CPT | Performed by: INTERNAL MEDICINE

## 2025-04-22 PROCEDURE — 1160F RVW MEDS BY RX/DR IN RCRD: CPT | Performed by: INTERNAL MEDICINE

## 2025-04-22 PROCEDURE — 99214 OFFICE O/P EST MOD 30 MIN: CPT | Performed by: INTERNAL MEDICINE

## 2025-04-22 PROCEDURE — 3075F SYST BP GE 130 - 139MM HG: CPT | Performed by: INTERNAL MEDICINE

## 2025-04-22 PROCEDURE — 3008F BODY MASS INDEX DOCD: CPT | Performed by: INTERNAL MEDICINE

## 2025-04-22 PROCEDURE — 3078F DIAST BP <80 MM HG: CPT | Performed by: INTERNAL MEDICINE

## 2025-04-22 RX ORDER — ATORVASTATIN CALCIUM 20 MG/1
20 TABLET, FILM COATED ORAL DAILY
Qty: 90 TABLET | Refills: 1 | Status: SHIPPED | OUTPATIENT
Start: 2025-04-22 | End: 2026-05-27

## 2025-04-22 RX ORDER — LISINOPRIL 5 MG/1
5 TABLET ORAL DAILY
Qty: 90 TABLET | Refills: 1 | Status: SHIPPED | OUTPATIENT
Start: 2025-04-22

## 2025-04-22 RX ORDER — INFLIXIMAB 100 MG/10ML
INJECTION, POWDER, LYOPHILIZED, FOR SOLUTION INTRAVENOUS
Qty: 4 EACH | Refills: 5 | Status: CANCELLED | OUTPATIENT
Start: 2025-04-22

## 2025-04-22 RX ORDER — HYDROGEN PEROXIDE 3 %
20 SOLUTION, NON-ORAL MISCELLANEOUS
Qty: 90 CAPSULE | Refills: 1 | Status: SHIPPED | OUTPATIENT
Start: 2025-04-22

## 2025-04-22 RX ORDER — HYDROCHLOROTHIAZIDE 25 MG/1
25 TABLET ORAL DAILY
Qty: 90 TABLET | Refills: 1 | Status: SHIPPED | OUTPATIENT
Start: 2025-04-22

## 2025-04-22 RX ORDER — PREDNISONE 5 MG/1
5 TABLET ORAL DAILY
Qty: 90 TABLET | Refills: 1 | Status: SHIPPED | OUTPATIENT
Start: 2025-04-22

## 2025-04-22 RX ORDER — BETAMETHASONE DIPROPIONATE 0.5 MG/G
1 OINTMENT TOPICAL DAILY PRN
Qty: 15 G | Refills: 2 | Status: SHIPPED | OUTPATIENT
Start: 2025-04-22

## 2025-04-22 ASSESSMENT — PAIN SCALES - GENERAL: PAINLEVEL_OUTOF10: 7

## 2025-04-22 NOTE — PATIENT INSTRUCTIONS
Please schedule colonoscopy and EGD.  Resume atorvastatin 20 mg daily.  Take vitamin D 2000 international units daily.  Check labs mid-June 2025, after 12 hour fast.  Medicare Wellness visit will be done at next appointment.  Follow-up in 3 months.

## 2025-04-22 NOTE — PROGRESS NOTES
Subjective   Patient ID: Tatiana Hagan is a 70 y.o. female who presents for Medicare wellness visit, as well as follow-up regarding overlap condition of rheumatoid arthritis and psoriatic arthritis, osteoporosis and CPPD arthropathy..    HPI 70 -year-old woman with history of overlap of rheumatoid arthritis and psoriatic arthritis , history of osteoporosis and CPPD arthropathy , who is here for follow-up visit.      The patient fell and fractured her left hip September 29, 2023.  Her leg is now feeling better and she is ambulating without assistance.    She also tripped and fell over her son's wheelchair August 22, 2024.  She had a fracture of her distal left clavicle which is now healed.  She can raise her arm above her head again.    She lost significant weight while in the rehab center . She is slowly regaining weight she does state that her appetite is good..  Current weight is 112 pounds with BMI 19.2.    Prolia injection was given 6/13/24.  Next was due 12/13/24-received April 16, 2025.     She had Kenalog injection of her right knee with good relief  11/29/23.    She remains on Remicade 6 mg/kg (400 mg total) IV every 6 weeks, prednisone 5 mg daily. She tried to taper her prednisone to 2.5 mg daily a few months ago but was unable to tolerate the taper.     Her psoriasis is stable.     She did have a cardiac calcium score 3/20 that was 188. Dr. Mo prescribed atorvastatin 80 mg daily.  However, she tells me that she stopped the atorvastatin shortly after starting it because she thought it was affecting her cognition (I was not aware of this).  Lipids done June 2024, on no statin therapy, showed cholesterol 178 and LDL 81.  ASCVD risk score is 22.8%.  She resumed atorvastatin 20 mg daily.     She continues to smoke 1 pack/day which she has done for 50 years. She is not currently interested in quitting smoking- this was discussed 4/22/25.    She has a history of erosive esophagitis and esophageal strictures.  Her last EGD and dilation was 3/19 with Dr. Cm.  Biopsies were negative for Reyes's esophagus.  She does still get food sticking.    She has a history of serrated colon polyps.  Her last colonoscopy was May 2018, she was due for follow-up May 2021.  This was discussed again April 9, 2024 but she did not want to follow-up at that time.  I have again recommended that she do follow-up colonoscopy, which she is now agreeable to .  This was discussed 7/16/24 and 10/17/24. Order has been placed.    She started Prolia 10/20.      EKG 9/23: normal.     Labs January 2024: CBC normal except white blood cell count 16.6, CMP except sodium 133, 25-hydroxy vitamin D 22, CRP less than 0.1  Labs June 2024: Cholesterol 178, HDL 65, LDL 81, triglycerides 161, ESR 12, CBC normal except white blood cell count 12.1, CMP normal except glucose 114  Labs January 2025: 25-hydroxy vitamin D 14, CMP normal except sodium 134  Labs April 2025: CMP normal except sodium 132     She received Reclast 10/13, 11/14, and 12/15.  DEXA 9/17: T score -3.2 femoral neck (decline of 4.8%), T score -3.0 total hip, T score -1.9 lumbar spine.   Received reclast 4/18 (because of loss of BMD 9/17), 7/19.    DEXA 10/19: T score -3.3 femoral neck (stable), T score -3.4 total hip (decline of 7.3%), T score -2.6 lumbar spine (decline of 8.5%)  DEXA December 2021: T score -2.8 left femoral neck (increased 13.2%), T score -3.2 left total hip (increased 4.3%), T score -2.3 lumbar spine (increased 3.6%)  DEXA 12/23: T-score -3.4 right total hip, T-score -2.9 right femoral neck, T-score -1.9 LS spine (increased 5.4%)     Health maintenance:   Mammograms have been refused.   Colonoscopy 5/18- needs 3 year follow-up because of serrated polyps. Discussed again 4/09/24, 7/16/24, 10/17/24.   EGD 3/18: 3 cm hiatal hernia, erosive gastritis, ring in distal 1/3 of esophagus (dilated)..   EGD 3/19: biopsies negative for Reyes's esophagus.   Pneumovax 2005, 5/21   Prevnar 13  "1/18   Moderna COVID-19 vaccine March 3, 2021, April 3, 2021. Received booster 10/20/21 and 6/22. BV booster was given 10/06/22.    eye exam 1/16   QuantiFERON-TB Gold negative 8/18  T spot negative 5/21   Low dose chest CT (for lung cancer screening) 7/24-needs 1 year follow-up.     Medical problem list:   - RA (, )- previously failed MTX.   Took MTX from 0835-6376 (not certain why it was stopped).   Took Humira from [id number].   Changed to remicade 2012 (because of worsening of psoriasis).      - psoriatic arthritis-    - Essential hypertension   - h/o CPPD arthropathy knee 1/09   - Cardiac calcium score 188 March 2020   - Osteoporosis- took Forteo [id number].    Started reclast 2010-had infusions October 2013, November 2014, December 2015, May 2018, July 2019.    Started Prolia 10/20 (due to loss of BMD and hip on DEXA October 2019.    - h/o bilateral sacral insufficiency fractures 2/21   - h/o left hip fracture 9/23   - h/o erosive esophagitis with recurrent strictures   - h/o serrated colon polyps   - left clavicle fracture     ROS:  General: Denies fevers or chills.  CV: Denies chest pain or palpitations.  Denies leg edema.  Lungs: Denies coughing or shortness of breath.  Skin: Denies rashes or nodules.  MS: Complains of right knee pain off-and-on.  Gets episodic low back pain.    Objective   There were no vitals taken for this visit.  Visit Vitals  OB Status Postmenopausal   Smoking Status Every Day   Visit Vitals  /70   Pulse 64   Temp 36.3 °C (97.3 °F)   Resp 14   Ht 1.626 m (5' 4\")   Wt 50.8 kg (112 lb)   SpO2 99%   BMI 19.22 kg/m²   OB Status Postmenopausal   Smoking Status Every Day   BSA 1.51 m²       Physical Exam  General Appearance: No acute distress.  HEENT: PERRL, EOMI  Neck: Supple, no nodes.  CV: RRR, no MGR.  Lungs: Clear, no rales or wheezes.  Abdomen: Soft, nontender. No hepatosplenomegaly.  Extremities:  No cyanosis, clubbing, or edema.  MS: Swollen: 0         Tender: 0      "    Patient global: 7         Evaluator global: 5          CDAI: 12 (moderate disease activity)          Prominent ulnar styloid bilaterally.            Skin: Scattered plaques on distal legs. Small plaques both elbows.    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.59    Rheumatoid arthritis M06.9    Relevant Medications    predniSONE (Deltasone) 5 mg tablet    Other Relevant Orders    CBC and Auto Differential    Comprehensive Metabolic Panel    C-Reactive Protein    Agatston CAC score 100-199 R93.1    Relevant Medications    atorvastatin (Lipitor) 20 mg tablet    Other Relevant Orders    Lipid panel     Other Visit Diagnoses         Codes      Primary hypertension     I10    Relevant Medications    lisinopril 5 mg tablet    hydroCHLOROthiazide (HYDRODiuril) 25 mg tablet      Acute non-recurrent frontal sinusitis     J01.10      Psoriasis     L40.9    Relevant Medications    betamethasone dipropionate (Diprosone) 0.05 % ointment      Dysphagia, unspecified type     R13.10    Relevant Orders    Esophagogastroduodenoscopy (EGD)      Tobacco use     Z72.0    Relevant Orders    CT lung screening low dose      Personal history of nicotine dependence     Z87.891    Relevant Orders    CT lung screening low dose          1. RA/ psoriatic arthritis- overall stable .  She is currently moderate disease activity by CDAI. She did not tolerate trying to reduce prednisone to 2.5 mg daily a few months ago , currently is back on 5 mg daily . Continue Remicade 6 mg/kg IV every 6 weeks.  Right knee was injected 11/23 with Kenalog.     2. Hypertension- at goal.      3. Osteoporosis- Previous fractures include left hip 9/23 and  bilateral sacral insufficiency fractures 2/21.  Took forteo 2008- 2010.   Received Reclast October 2013, November 2014, December 2015, then again May 2018 (because of loss of BMD) and 7/19. DEXA 10/19 shows loss of BMD in hip..   Started prolia 10/20. Last dose was 4/16/25 (she was 4 months late with dose).  DEXA done December 2023 shows T-score -2.9 right femoral neck, T-score -3.4 right total hip, T-score -1.9 lumbar spine.  Bone density  has increased 5.4% in the lumbar spine. Since right hip was used, cannot do comparison.  Next prolia was due 10/25. She was told to call infusion center to schedule.   Next DEXA will be due 12/25.      4. History of erosive gastritis- continue Nexium.  She is still getting occasional episodes of food sticking.  I recommend another EGD in case she needs a dilation. She is also past due for her 3-year follow-up colonoscopy because of serrated polyps in March 2018.  This was discussed again in April 9, 2024 , 7/16/24, 10/17/24, and 4/22/25. .  She was referred to GI for colonoscopy and EGD.     5. . Cardiac calcium score 188 March 2020-she stopped atorvastatin 80 mg because she thought it was affecting her cognition.  I had her resume atorvastatin 20 mg daily.     6. Lung cancer screening-low-dose chest CT done 7/24,  needs 1 year follow-up. Ordered for 7/25.     8. Advanced Care planning-Discussed 7/16/24. she has a living well. HPOA is  [place]. She is full code.     9. BMI 19.1-slowly improving since she left the rehab facility.    10.  Medicare wellness visit- done 7/16/24.    Plan:  Please schedule colonoscopy and EGD.  Resume atorvastatin 20 mg daily.  Take vitamin D 2000 international units daily.  Check labs mid-June 2025, after 12 hour fast.  Medicare Wellness visit will be done at next appointment.  Follow-up in 3 months.

## 2025-04-24 ENCOUNTER — TELEPHONE (OUTPATIENT)
Dept: GASTROENTEROLOGY | Facility: EXTERNAL LOCATION | Age: 70
End: 2025-04-24
Payer: MEDICARE

## 2025-05-05 ENCOUNTER — TELEPHONE (OUTPATIENT)
Dept: PRIMARY CARE | Facility: CLINIC | Age: 70
End: 2025-05-05
Payer: MEDICARE

## 2025-05-05 NOTE — TELEPHONE ENCOUNTER
----- Message from Shauna Bolanos sent at 5/3/2025 12:39 PM EDT -----  Please call. Chest CT shows no suspicious lung nodules. Recommend repeat chest CT in 1 year.Dr. Kamara

## 2025-05-19 ENCOUNTER — TELEPHONE (OUTPATIENT)
Dept: GASTROENTEROLOGY | Facility: EXTERNAL LOCATION | Age: 70
End: 2025-05-19
Payer: MEDICARE

## 2025-05-22 DIAGNOSIS — M05.79 RHEUMATOID ARTHRITIS INVOLVING MULTIPLE SITES WITH POSITIVE RHEUMATOID FACTOR (MULTI): ICD-10-CM

## 2025-05-22 DIAGNOSIS — R93.1 AGATSTON CAC SCORE 100-199: ICD-10-CM

## 2025-05-22 DIAGNOSIS — E78.5 HYPERLIPIDEMIA, UNSPECIFIED HYPERLIPIDEMIA TYPE: ICD-10-CM

## 2025-05-28 ENCOUNTER — APPOINTMENT (OUTPATIENT)
Dept: INFUSION THERAPY | Facility: CLINIC | Age: 70
End: 2025-05-28
Payer: MEDICARE

## 2025-05-28 VITALS
DIASTOLIC BLOOD PRESSURE: 76 MMHG | WEIGHT: 110.12 LBS | RESPIRATION RATE: 16 BRPM | BODY MASS INDEX: 18.9 KG/M2 | TEMPERATURE: 97.2 F | OXYGEN SATURATION: 98 % | SYSTOLIC BLOOD PRESSURE: 138 MMHG | HEART RATE: 82 BPM

## 2025-05-28 DIAGNOSIS — M05.762 RHEUMATOID ARTHRITIS INVOLVING BOTH KNEES WITH POSITIVE RHEUMATOID FACTOR (MULTI): ICD-10-CM

## 2025-05-28 DIAGNOSIS — M05.761 RHEUMATOID ARTHRITIS INVOLVING BOTH KNEES WITH POSITIVE RHEUMATOID FACTOR (MULTI): ICD-10-CM

## 2025-05-28 PROCEDURE — 96413 CHEMO IV INFUSION 1 HR: CPT | Performed by: NURSE PRACTITIONER

## 2025-05-28 RX ORDER — ALBUTEROL SULFATE 0.83 MG/ML
3 SOLUTION RESPIRATORY (INHALATION) AS NEEDED
OUTPATIENT
Start: 2025-07-09

## 2025-05-28 RX ORDER — FAMOTIDINE 10 MG/ML
20 INJECTION, SOLUTION INTRAVENOUS ONCE AS NEEDED
OUTPATIENT
Start: 2025-07-09

## 2025-05-28 RX ORDER — EPINEPHRINE 0.3 MG/.3ML
0.3 INJECTION SUBCUTANEOUS EVERY 5 MIN PRN
OUTPATIENT
Start: 2025-07-09

## 2025-05-28 RX ORDER — DIPHENHYDRAMINE HYDROCHLORIDE 50 MG/ML
50 INJECTION, SOLUTION INTRAMUSCULAR; INTRAVENOUS AS NEEDED
OUTPATIENT
Start: 2025-07-09

## 2025-05-28 ASSESSMENT — ENCOUNTER SYMPTOMS
SHORTNESS OF BREATH: 0
PALPITATIONS: 0
LEG SWELLING: 0
ARTHRALGIAS: 1
FREQUENCY: 0
NAUSEA: 0
DIZZINESS: 0
ABDOMINAL PAIN: 0
VOMITING: 0
TROUBLE SWALLOWING: 0
SORE THROAT: 0
WOUND: 0
UNEXPECTED WEIGHT CHANGE: 0
HEMATURIA: 0
WHEEZING: 0
CHILLS: 0
COUGH: 0
LIGHT-HEADEDNESS: 0
EXTREMITY WEAKNESS: 0
DIARRHEA: 0
DYSURIA: 0
BRUISES/BLEEDS EASILY: 0
NUMBNESS: 0
EYE PROBLEMS: 0
BLOOD IN STOOL: 0
HEADACHES: 0
APPETITE CHANGE: 0
FATIGUE: 1
VOICE CHANGE: 0
CONSTIPATION: 0
FEVER: 0
MYALGIAS: 0

## 2025-05-28 NOTE — PATIENT INSTRUCTIONS
Today you received:  REMICADE 400MG INFUSION Q42 DAYS       For:    1. Rheumatoid arthritis involving both knees with positive rheumatoid factor (Multi)            Please read the  Medication Guide that was given to you and reviewed during todays visit.     (Tell all doctors including dentists that you are taking this medication)     Go to the emergency room or call 911 if:  -You have signs of allergic reaction:   o         Rash, hives, itching.   o         Swollen, blistered, peeling skin.   o         Swelling of face, lips, mouth, tongue or throat.   o         Tightness of chest, trouble breathing, swallowing or talking      Call your doctor:     - If IV / injection site gets red, warm, swollen, itchy or leaks fluid or pus.     (Leave dressing on your IV site for at least 2 hours and keep area clean and dry    - If you get sick or have symptoms of infection or are not feeling well for any reason.    (Wash your hands often, stay away from people who are sick)    - If you have side effects from your medication that do not go away or are bothersome.     (Refer to the teaching your nurse gave you for side effects to call your doctor about)     Common side effects may include:  stuffy nose, headache, feeling tired, muscle aches, upset stomach    - Before receiving any vaccines, Call the [facility] at [phone]     - You get sick, are on antibiotics, have had a recent vaccine, have surgery or dental work and your doctor wants your visit rescheduled.    - You need to cancel and reschedule your visit for any reason. Call at least 2 days before your visit if you need to cancel.     - Your insurance changes before your next visit.    (We will need to get approval from your new insurance. This can take up to two weeks.)     The [facility] is opened Monday thru Friday 8am-8pm and Saturday 8am-4:30pm. We are closed on holidays.     Voice mail will take your call if the center is closed. If  you leave a message please allow 24 hours for a call back during weekdays. If you leave a message on a weekend/holiday, we will call you back the next business day.

## 2025-05-28 NOTE — PROGRESS NOTES
[facility]   Infusion Clinic Note   Date: May 28, 2025   Name: Tatiana Hagan  : 1955   MRN: [id number]         Reason for Visit: OP Infusion (PATIENT HERE FOR HER REMICADE 400MG INFUSION Q42 DAYS )         Today: We administered inFLIXimab (Remicade) 400 mg in sodium chloride 0.9% 250 mL IV.       Ordered By: Shauna Bolanos,*       For a Diagnosis of: Rheumatoid arthritis involving both knees with positive rheumatoid factor (Multi)       At today's visit patient accompanied by: Self      Today's Vitals:   Vitals:    25 1307 25 1420   BP: 147/81 138/76   Pulse: 88 82   Resp: 16 16   Temp: 36.2 °C (97.2 °F) 36.2 °C (97.2 °F)   SpO2: 98% 98%   Weight: 50 kg (110 lb 1.9 oz)              Pre - Treatment Checklist:      - Previous reaction to current treatment: no      (Assess patient for the concerns below. Document provider notification as appropriate).  - Active or recent infection with/without current antibiotic use: no  - Recent or planned invasive dental work: no  - Recent or planned surgeries: no  - Recently received or plans to receive vaccinations: no  - Has treatment related toxicities: no  - Any chance may be pregnant:  n/a      Pain: 0   - Is the pain different from normal: n/a   - Is prescribing Doctor aware:  n/a      Labs: Reviewed       Fall Risk Screening: Anand Fall Risk  History of Falling, Immediate or Within 3 Months: No  Secondary Diagnosis: No  Ambulatory Aid: Walks without aid/bedrest/nurse assist  Intravenous Therapy/Heparin Lock: No  Gait/Transferring: Normal/bedrest/immobile  Mental Status: Oriented to own ability  Anand Fall Risk Score: 0       Review Of Systems:  Review of Systems   Constitutional:  Positive for fatigue. Negative for appetite change, chills, fever and unexpected weight change.   HENT:   Negative for hearing loss, mouth sores, sore throat, tinnitus, trouble swallowing and voice change.    Eyes:  Negative for eye problems.  "  Respiratory:  Negative for cough, shortness of breath and wheezing.    Cardiovascular:  Negative for chest pain, leg swelling and palpitations.   Gastrointestinal:  Negative for abdominal pain, blood in stool, constipation, diarrhea, nausea and vomiting.   Genitourinary:  Negative for dysuria, frequency and hematuria.    Musculoskeletal:  Positive for arthralgias. Negative for myalgias.   Skin:  Negative for itching, rash and wound.   Neurological:  Negative for dizziness, extremity weakness, headaches, light-headedness and numbness.   Hematological:  Does not bruise/bleed easily.         Infusion Readiness:  - Assessment Concerns Related to Infusion: No  - Provider notified: n/a      New Patient Education:    N/A (returning patient for continuation of therapy. Ongoing education provided as needed.)        Treatment Conditions & Drug Specific Questions:    InFLIXimab  (AVSOLA, INFLECTRA, REMICADE, RENFLEXIS)    (Unless otherwise specified on patient specific therapy plan):     TREATMENT CONDITIONS:  Unless otherwise specified on patient specific therapy plan HOLD and notify Provider prior to proceeding with today's infusion if patient with:  o Positive T-Spot  o Reactive Hep B sAg and/or Hep B Core Antibody    Lab Results   Component Value Date    TBSIN Negative 06/13/2022    QFG NEGATIVE 08/28/2018      No results found for: \"HAGCN\", \"HEPBSURABI\", \"HBSAG\", \"XHAGF\", \"HEPBSAG\", \"NONUHSWGH\"   No results found for: \"NONUHFIRE\", \"NONUHSWGH\", \"NONUHFISH\"  No results found for: \"HBCTI\", \"HEPBCAB\"  No results found for: \"HEPATOT\", \"HEPAIGM\", \"HEPBCIGM\", \"HEPBCAB\", \"HBEAG\", \"HEPCAB\"     Patient meets treatment conditions? Yes    DRUG SPECIFIC QUESTIONS:    Any new or worsening signs / symptoms of heart failure which may include things like worsening shortness of breath, swelling, fatigue? No   (If yes notify provider before proceeding with today's infusion. New onset or worsening HF have been reported with " infliximab)    REMINDER:   WEIGHT BASED DRUG     Recommended Vitals/Observation:  Vitals:     Induction: Obtain vital signs every 30 minutes; at end of observation period and as needed.     Maintenance: Obtain vital signs at start and end of infusions  Observation:     Induction: Patient is monitored for 30 minutes post-infusion      Maintenance: No observation.        Weight Based Drug Calculations:    WEIGHT BASED DRUGS: Infliximab (REMICADE, INFLECTRA, RENFLEXIS)   Patient's dosing weight (kg): 49.9kg     10% weight variance for prescribed treatment: 44.9 kg to 54.9 kg     Patient's weight today:   Vitals:    05/28/25 1307   Weight: 50 kg (110 lb 1.9 oz)         weight range for prescribed dose:     Patient weight today falls outside of 10% variance or  weight range: No     Home Care pharmacist informed of weight variance: Not applicable    Doses that are weight based have an acceptable variance rule within 10% of the prescribed   order and/or within  weight range. If patient weight on day of infusion falls   outside of the 10% variance, or weight range, infusion is administered and   pharmacy contacted regarding future dosing adjustments, per policy.      Post Treatment: Patient tolerated treatment without issue and was discharged in no apparent distress.      Note Authored / Patient Cared for By: Madhu Alcala RN

## 2025-06-24 ENCOUNTER — TELEPHONE (OUTPATIENT)
Dept: RADIOLOGY | Facility: HOSPITAL | Age: 70
End: 2025-06-24
Payer: MEDICARE

## 2025-06-24 NOTE — TELEPHONE ENCOUNTER
VM message left requesting patient to call and schedule the follow up Lung Cancer Screening exam. Requested they schedule with radiology @ [phone].Encouraged  to return my call @  [phone] for any additional assistance.

## 2025-07-07 ENCOUNTER — APPOINTMENT (OUTPATIENT)
Dept: INFUSION THERAPY | Facility: CLINIC | Age: 70
End: 2025-07-07
Payer: MEDICARE

## 2025-07-09 ENCOUNTER — APPOINTMENT (OUTPATIENT)
Dept: INFUSION THERAPY | Facility: CLINIC | Age: 70
End: 2025-07-09
Payer: MEDICARE

## 2025-07-17 ENCOUNTER — TELEPHONE (OUTPATIENT)
Dept: RADIOLOGY | Facility: HOSPITAL | Age: 70
End: 2025-07-17
Payer: MEDICARE

## 2025-07-22 ENCOUNTER — APPOINTMENT (OUTPATIENT)
Dept: RHEUMATOLOGY | Facility: CLINIC | Age: 70
End: 2025-07-22
Payer: MEDICARE

## 2025-07-23 DIAGNOSIS — M05.79 RHEUMATOID ARTHRITIS INVOLVING MULTIPLE SITES WITH POSITIVE RHEUMATOID FACTOR (MULTI): ICD-10-CM

## 2025-07-23 RX ORDER — INFLIXIMAB 100 MG/10ML
INJECTION, POWDER, LYOPHILIZED, FOR SOLUTION INTRAVENOUS
Qty: 4 EACH | Refills: 2 | Status: SHIPPED
Start: 2025-07-23

## 2025-07-27 DIAGNOSIS — M05.79 RHEUMATOID ARTHRITIS INVOLVING MULTIPLE SITES WITH POSITIVE RHEUMATOID FACTOR (MULTI): ICD-10-CM

## 2025-07-28 RX ORDER — PREDNISONE 5 MG/1
5 TABLET ORAL DAILY
Qty: 90 TABLET | Refills: 1 | Status: SHIPPED | OUTPATIENT
Start: 2025-07-28

## 2025-07-31 ENCOUNTER — APPOINTMENT (OUTPATIENT)
Dept: INFUSION THERAPY | Facility: CLINIC | Age: 70
End: 2025-07-31
Payer: MEDICARE

## 2025-08-14 ENCOUNTER — APPOINTMENT (OUTPATIENT)
Dept: INFUSION THERAPY | Facility: CLINIC | Age: 70
End: 2025-08-14
Payer: MEDICARE

## 2025-08-14 VITALS
BODY MASS INDEX: 18.45 KG/M2 | TEMPERATURE: 97 F | SYSTOLIC BLOOD PRESSURE: 129 MMHG | WEIGHT: 107.47 LBS | OXYGEN SATURATION: 100 % | RESPIRATION RATE: 18 BRPM | DIASTOLIC BLOOD PRESSURE: 74 MMHG | HEART RATE: 82 BPM

## 2025-08-14 DIAGNOSIS — M05.761 RHEUMATOID ARTHRITIS INVOLVING BOTH KNEES WITH POSITIVE RHEUMATOID FACTOR (MULTI): ICD-10-CM

## 2025-08-14 DIAGNOSIS — M05.762 RHEUMATOID ARTHRITIS INVOLVING BOTH KNEES WITH POSITIVE RHEUMATOID FACTOR (MULTI): ICD-10-CM

## 2025-08-14 PROCEDURE — 96413 CHEMO IV INFUSION 1 HR: CPT | Performed by: REGISTERED NURSE

## 2025-08-14 RX ORDER — DIPHENHYDRAMINE HYDROCHLORIDE 50 MG/ML
50 INJECTION, SOLUTION INTRAMUSCULAR; INTRAVENOUS AS NEEDED
OUTPATIENT
Start: 2025-08-20

## 2025-08-14 RX ORDER — ALBUTEROL SULFATE 0.83 MG/ML
3 SOLUTION RESPIRATORY (INHALATION) AS NEEDED
OUTPATIENT
Start: 2025-08-20

## 2025-08-14 RX ORDER — FAMOTIDINE 10 MG/ML
20 INJECTION, SOLUTION INTRAVENOUS ONCE AS NEEDED
OUTPATIENT
Start: 2025-08-20

## 2025-08-14 RX ORDER — EPINEPHRINE 0.3 MG/.3ML
0.3 INJECTION SUBCUTANEOUS EVERY 5 MIN PRN
OUTPATIENT
Start: 2025-08-20

## 2025-08-14 ASSESSMENT — ENCOUNTER SYMPTOMS
CHILLS: 0
MYALGIAS: 0
HEMATURIA: 0
ARTHRALGIAS: 0
PALPITATIONS: 0
ABDOMINAL PAIN: 0
LIGHT-HEADEDNESS: 0
APPETITE CHANGE: 0
EYE PROBLEMS: 0
EXTREMITY WEAKNESS: 0
DIZZINESS: 0
DIARRHEA: 0
TROUBLE SWALLOWING: 0
WOUND: 0
NAUSEA: 0
FREQUENCY: 0
DYSURIA: 0
SHORTNESS OF BREATH: 0
CONSTIPATION: 0
WHEEZING: 0
NUMBNESS: 0
FEVER: 0
HEADACHES: 0
VOICE CHANGE: 0
BRUISES/BLEEDS EASILY: 1
LEG SWELLING: 0
FATIGUE: 0
BLOOD IN STOOL: 0
COUGH: 0
UNEXPECTED WEIGHT CHANGE: 0
SORE THROAT: 0
VOMITING: 0

## 2025-08-19 ENCOUNTER — PATIENT OUTREACH (OUTPATIENT)
Dept: CARE COORDINATION | Facility: CLINIC | Age: 70
End: 2025-08-19
Payer: MEDICARE

## 2025-08-19 DIAGNOSIS — Z12.31 ENCOUNTER FOR SCREENING MAMMOGRAM FOR BREAST CANCER: ICD-10-CM

## 2025-08-20 ENCOUNTER — APPOINTMENT (OUTPATIENT)
Dept: INFUSION THERAPY | Facility: CLINIC | Age: 70
End: 2025-08-20
Payer: MEDICARE

## 2025-09-11 ENCOUNTER — APPOINTMENT (OUTPATIENT)
Dept: INFUSION THERAPY | Facility: CLINIC | Age: 70
End: 2025-09-11
Payer: MEDICARE

## 2025-10-01 ENCOUNTER — APPOINTMENT (OUTPATIENT)
Dept: INFUSION THERAPY | Facility: CLINIC | Age: 70
End: 2025-10-01
Payer: MEDICARE

## 2025-10-13 ENCOUNTER — APPOINTMENT (OUTPATIENT)
Dept: INFUSION THERAPY | Facility: CLINIC | Age: 70
End: 2025-10-13
Payer: MEDICARE

## (undated) DEVICE — COVER LT HNDL BLU PLAS

## (undated) DEVICE — PADDING CAST N ADH 4 YDX3 IN HIGHLY ABSORBENT EZ APPL SOFROL

## (undated) DEVICE — HAND II: Brand: MEDLINE INDUSTRIES, INC.

## (undated) DEVICE — APPLICATOR MEDICATED 26 CC SOLUTION HI LT ORNG CHLORAPREP

## (undated) DEVICE — SPONGE GZ W4XL4IN COT 12 PLY TYP VII WVN C FLD DSGN

## (undated) DEVICE — GOWN,AURORA,NONREINFORCED,LARGE: Brand: MEDLINE

## (undated) DEVICE — DRESSING PETRO W3XL8IN OIL EMUL N ADH GZ KNIT IMPREG CELOS

## (undated) DEVICE — BANDAGE COMPR W3INXL5YD HI E BGE W/ CLP SURE-WRAP

## (undated) DEVICE — SPLINT CAST W3XL15IN GRN STRENGTH PLSTR OF PARIS FAST SET

## (undated) DEVICE — GLOVE ORANGE PI 7   MSG9070

## (undated) DEVICE — BIT DRL L75/31MM DIA1MM FOR 1.3MM SCR PILOT THRD H K WIRE

## (undated) DEVICE — GLOVE ORANGE PI 7 1/2   MSG9075

## (undated) DEVICE — LABEL MED MINI W/ MARKER

## (undated) DEVICE — DRAPE SURG W41XL74IN CLR FULL SZ C ARM 3 ADH POLY STRP E